# Patient Record
Sex: MALE | Race: WHITE | Employment: FULL TIME | URBAN - METROPOLITAN AREA
[De-identification: names, ages, dates, MRNs, and addresses within clinical notes are randomized per-mention and may not be internally consistent; named-entity substitution may affect disease eponyms.]

---

## 2018-12-27 ENCOUNTER — APPOINTMENT (OUTPATIENT)
Dept: CT IMAGING | Facility: HOSPITAL | Age: 33
DRG: 392 | End: 2018-12-27
Attending: EMERGENCY MEDICINE
Payer: COMMERCIAL

## 2018-12-27 ENCOUNTER — HOSPITAL ENCOUNTER (INPATIENT)
Facility: HOSPITAL | Age: 33
LOS: 3 days | Discharge: HOME | DRG: 392 | End: 2018-12-30
Attending: EMERGENCY MEDICINE | Admitting: HOSPITALIST
Payer: COMMERCIAL

## 2018-12-27 DIAGNOSIS — K52.9 INFLAMMATORY BOWEL DISEASE: Primary | ICD-10-CM

## 2018-12-27 DIAGNOSIS — E11.9 TYPE 2 DIABETES MELLITUS WITHOUT COMPLICATION, WITHOUT LONG-TERM CURRENT USE OF INSULIN (HCC): ICD-10-CM

## 2018-12-27 LAB
ALBUMIN SERPL-MCNC: 3.4 G/DL (ref 3.1–4.5)
ALBUMIN/GLOB SERPL: 0.8 {RATIO} (ref 1–2)
ALP LIVER SERPL-CCNC: 98 U/L (ref 45–117)
ALT SERPL-CCNC: 16 U/L (ref 17–63)
ANION GAP SERPL CALC-SCNC: 11 MMOL/L (ref 0–18)
AST SERPL-CCNC: 21 U/L (ref 15–41)
BASOPHILS # BLD AUTO: 0.03 X10(3) UL (ref 0–0.1)
BASOPHILS NFR BLD AUTO: 0.3 %
BILIRUB SERPL-MCNC: 1.5 MG/DL (ref 0.1–2)
BILIRUB UR QL STRIP.AUTO: NEGATIVE
BUN BLD-MCNC: 11 MG/DL (ref 8–20)
BUN/CREAT SERPL: 17.2 (ref 10–20)
CALCIUM BLD-MCNC: 8.5 MG/DL (ref 8.3–10.3)
CHLORIDE SERPL-SCNC: 101 MMOL/L (ref 101–111)
CLARITY UR REFRACT.AUTO: CLEAR
CO2 SERPL-SCNC: 21 MMOL/L (ref 22–32)
COLOR UR AUTO: YELLOW
CREAT BLD-MCNC: 0.64 MG/DL (ref 0.7–1.3)
CRP SERPL-MCNC: 3.69 MG/DL (ref ?–1)
EOSINOPHIL # BLD AUTO: 0.07 X10(3) UL (ref 0–0.3)
EOSINOPHIL NFR BLD AUTO: 0.6 %
ERYTHROCYTE [DISTWIDTH] IN BLOOD BY AUTOMATED COUNT: 12.7 % (ref 11.5–16)
GLOBULIN PLAS-MCNC: 4.3 G/DL (ref 2.8–4.4)
GLUCOSE BLD-MCNC: 213 MG/DL (ref 70–99)
GLUCOSE UR STRIP.AUTO-MCNC: >=500 MG/DL
HCT VFR BLD AUTO: 49.6 % (ref 37–53)
HGB BLD-MCNC: 17.1 G/DL (ref 13–17)
IMMATURE GRANULOCYTE COUNT: 0.07 X10(3) UL (ref 0–1)
IMMATURE GRANULOCYTE RATIO %: 0.6 %
KETONES UR STRIP.AUTO-MCNC: 80 MG/DL
LEUKOCYTE ESTERASE UR QL STRIP.AUTO: NEGATIVE
LIPASE: 51 U/L (ref 73–393)
LYMPHOCYTES # BLD AUTO: 1.84 X10(3) UL (ref 0.9–4)
LYMPHOCYTES NFR BLD AUTO: 16.5 %
M PROTEIN MFR SERPL ELPH: 7.7 G/DL (ref 6.4–8.2)
MCH RBC QN AUTO: 30.4 PG (ref 27–33.2)
MCHC RBC AUTO-ENTMCNC: 34.5 G/DL (ref 31–37)
MCV RBC AUTO: 88.3 FL (ref 80–99)
MONOCYTES # BLD AUTO: 0.86 X10(3) UL (ref 0.1–1)
MONOCYTES NFR BLD AUTO: 7.7 %
NEUTROPHIL ABS PRELIM: 8.27 X10 (3) UL (ref 1.3–6.7)
NEUTROPHILS # BLD AUTO: 8.27 X10(3) UL (ref 1.3–6.7)
NEUTROPHILS NFR BLD AUTO: 74.3 %
NITRITE UR QL STRIP.AUTO: NEGATIVE
OSMOLALITY SERPL CALC.SUM OF ELEC: 282 MOSM/KG (ref 275–295)
PH UR STRIP.AUTO: 5 [PH] (ref 4.5–8)
PLATELET # BLD AUTO: 282 10(3)UL (ref 150–450)
POTASSIUM SERPL-SCNC: 4.1 MMOL/L (ref 3.6–5.1)
PROT UR STRIP.AUTO-MCNC: 30 MG/DL
RBC # BLD AUTO: 5.62 X10(6)UL (ref 4.3–5.7)
RBC UR QL AUTO: NEGATIVE
RED CELL DISTRIBUTION WIDTH-SD: 41 FL (ref 35.1–46.3)
SED RATE-ML: 11 MM/HR (ref 0–12)
SODIUM SERPL-SCNC: 133 MMOL/L (ref 136–144)
SP GR UR STRIP.AUTO: 1.04 (ref 1–1.03)
UROBILINOGEN UR STRIP.AUTO-MCNC: <2 MG/DL
WBC # BLD AUTO: 11.1 X10(3) UL (ref 4–13)

## 2018-12-27 PROCEDURE — 99223 1ST HOSP IP/OBS HIGH 75: CPT | Performed by: HOSPITALIST

## 2018-12-27 PROCEDURE — 74177 CT ABD & PELVIS W/CONTRAST: CPT | Performed by: EMERGENCY MEDICINE

## 2018-12-27 RX ORDER — MORPHINE SULFATE 4 MG/ML
1 INJECTION, SOLUTION INTRAMUSCULAR; INTRAVENOUS EVERY 2 HOUR PRN
Status: DISCONTINUED | OUTPATIENT
Start: 2018-12-27 | End: 2018-12-30

## 2018-12-27 RX ORDER — LEVOFLOXACIN 5 MG/ML
500 INJECTION, SOLUTION INTRAVENOUS ONCE
Status: DISCONTINUED | OUTPATIENT
Start: 2018-12-27 | End: 2018-12-27

## 2018-12-27 RX ORDER — SODIUM CHLORIDE 9 MG/ML
INJECTION, SOLUTION INTRAVENOUS CONTINUOUS
Status: DISCONTINUED | OUTPATIENT
Start: 2018-12-27 | End: 2018-12-27

## 2018-12-27 RX ORDER — HYDROMORPHONE HYDROCHLORIDE 1 MG/ML
0.5 INJECTION, SOLUTION INTRAMUSCULAR; INTRAVENOUS; SUBCUTANEOUS ONCE
Status: COMPLETED | OUTPATIENT
Start: 2018-12-27 | End: 2018-12-27

## 2018-12-27 RX ORDER — ONDANSETRON 2 MG/ML
4 INJECTION INTRAMUSCULAR; INTRAVENOUS EVERY 6 HOURS PRN
Status: DISCONTINUED | OUTPATIENT
Start: 2018-12-27 | End: 2018-12-30

## 2018-12-27 RX ORDER — MORPHINE SULFATE 4 MG/ML
4 INJECTION, SOLUTION INTRAMUSCULAR; INTRAVENOUS EVERY 2 HOUR PRN
Status: DISCONTINUED | OUTPATIENT
Start: 2018-12-27 | End: 2018-12-30

## 2018-12-27 RX ORDER — METOCLOPRAMIDE HYDROCHLORIDE 5 MG/ML
10 INJECTION INTRAMUSCULAR; INTRAVENOUS EVERY 8 HOURS PRN
Status: DISCONTINUED | OUTPATIENT
Start: 2018-12-27 | End: 2018-12-30

## 2018-12-27 RX ORDER — ENOXAPARIN SODIUM 100 MG/ML
40 INJECTION SUBCUTANEOUS DAILY
Status: DISCONTINUED | OUTPATIENT
Start: 2018-12-27 | End: 2018-12-30

## 2018-12-27 RX ORDER — MORPHINE SULFATE 4 MG/ML
2 INJECTION, SOLUTION INTRAMUSCULAR; INTRAVENOUS EVERY 2 HOUR PRN
Status: DISCONTINUED | OUTPATIENT
Start: 2018-12-27 | End: 2018-12-30

## 2018-12-27 RX ORDER — SODIUM CHLORIDE 9 MG/ML
INJECTION, SOLUTION INTRAVENOUS CONTINUOUS
Status: DISCONTINUED | OUTPATIENT
Start: 2018-12-27 | End: 2018-12-30

## 2018-12-27 RX ORDER — ONDANSETRON 2 MG/ML
4 INJECTION INTRAMUSCULAR; INTRAVENOUS ONCE
Status: COMPLETED | OUTPATIENT
Start: 2018-12-27 | End: 2018-12-27

## 2018-12-27 RX ORDER — METRONIDAZOLE 500 MG/100ML
500 INJECTION, SOLUTION INTRAVENOUS ONCE
Status: DISCONTINUED | OUTPATIENT
Start: 2018-12-27 | End: 2018-12-27

## 2018-12-27 NOTE — H&P
[facility]  History and Physical     Phil Griggs Patient Status:  Emergency    1985 MRN [id number]   Location [address] [facility] Attending WillRebeca MD   Hosp Day # 0 PCP None Pcp     Chief Complaint: Nausea, medications on file prior to encounter. Review of Systems:   A comprehensive 14 point review of systems was completed. Pertinent positives and negatives noted in the HPI.     Physical Exam:    BP (!) 121/91   Pulse 71   Temp 98.4 °F (36.9 °C) (Oral)

## 2018-12-27 NOTE — ED NOTES
Pt has not taken an actual temp at home, but he reports having chills two nights ago. Afebrile here.

## 2018-12-27 NOTE — ED INITIAL ASSESSMENT (HPI)
Patient with mid-abdominal pain, vomiting and diarrhea for 3 days. Patient states he had fevers at home yesterday. Unable to tolerate po intake. Denies any urinary symptoms.

## 2018-12-27 NOTE — ED PROVIDER NOTES
Patient Seen in: [facility] Emergency Department    History   Patient presents with:  Abdomen/Flank Pain (GI/)    Stated Complaint: Mid abdominal pain , vomiting, diarrhea since 12/24    HPI    Patient is a 29-year-old previously healthy male pres SpO2 96 %   O2 Device None (Room air)       Current:BP (!) 121/91   Pulse 71   Temp 98.4 °F (36.9 °C) (Oral)   Resp 16   Ht 185.4 cm (6' 1\")   Wt 122.5 kg   SpO2 97%   BMI 35.62 kg/m²         Physical Exam    General: Comfortable and well appearing.  Rose Mary other components within normal limits   CBC W/ DIFFERENTIAL - Abnormal; Notable for the following components:    HGB 17.1 (*)     Neutrophil Absolute Prelim 8.27 (*)     Neutrophil Absolute 8.27 (*)     All other components within normal limits   CBC WITH uncomfortable. No peritoneal signs. However CT is concerning for inflammatory bowel disease. He does not have a personal history of inflammatory bowel disease nor a family history of inflammatory bowel disease.   I believe he would benefit from admission

## 2018-12-27 NOTE — ED NOTES
Full bedside report on patient condition received from [facility]. Patient resting comfortably in bed at this time, no acute distress noted. Patient reports his pain has resolved to a four at this time. No acute distress noted. Will continue to monitor.

## 2018-12-28 LAB
ALBUMIN SERPL-MCNC: 2.7 G/DL (ref 3.1–4.5)
ALBUMIN/GLOB SERPL: 0.8 {RATIO} (ref 1–2)
ALP LIVER SERPL-CCNC: 74 U/L (ref 45–117)
ALT SERPL-CCNC: 12 U/L (ref 17–63)
ANION GAP SERPL CALC-SCNC: 9 MMOL/L (ref 0–18)
AST SERPL-CCNC: 9 U/L (ref 15–41)
BASOPHILS # BLD AUTO: 0.03 X10(3) UL (ref 0–0.1)
BASOPHILS NFR BLD AUTO: 0.4 %
BILIRUB SERPL-MCNC: 1 MG/DL (ref 0.1–2)
BUN BLD-MCNC: 7 MG/DL (ref 8–20)
BUN/CREAT SERPL: 13 (ref 10–20)
CALCIUM BLD-MCNC: 8.1 MG/DL (ref 8.3–10.3)
CHLORIDE SERPL-SCNC: 107 MMOL/L (ref 101–111)
CO2 SERPL-SCNC: 22 MMOL/L (ref 22–32)
CREAT BLD-MCNC: 0.54 MG/DL (ref 0.7–1.3)
CRP SERPL-MCNC: 2.87 MG/DL (ref ?–1)
EOSINOPHIL # BLD AUTO: 0.15 X10(3) UL (ref 0–0.3)
EOSINOPHIL NFR BLD AUTO: 1.9 %
ERYTHROCYTE [DISTWIDTH] IN BLOOD BY AUTOMATED COUNT: 12.9 % (ref 11.5–16)
EST. AVERAGE GLUCOSE BLD GHB EST-MCNC: 338 MG/DL (ref 68–126)
GLOBULIN PLAS-MCNC: 3.5 G/DL (ref 2.8–4.4)
GLUCOSE BLD-MCNC: 172 MG/DL (ref 70–99)
HBA1C MFR BLD HPLC: 13.4 % (ref ?–5.7)
HBV CORE AB SERPL QL IA: NONREACTIVE
HBV CORE IGM SER QL: NONREACTIVE
HBV SURFACE AB SER QL: NONREACTIVE
HBV SURFACE AB SERPL IA-ACNC: 4.67 MIU/ML
HBV SURFACE AG SER-ACNC: <0.1 [IU]/L
HBV SURFACE AG SERPL QL IA: NONREACTIVE
HCT VFR BLD AUTO: 43.4 % (ref 37–53)
HGB BLD-MCNC: 14.8 G/DL (ref 13–17)
IMMATURE GRANULOCYTE COUNT: 0.04 X10(3) UL (ref 0–1)
IMMATURE GRANULOCYTE RATIO %: 0.5 %
LYMPHOCYTES # BLD AUTO: 3.36 X10(3) UL (ref 0.9–4)
LYMPHOCYTES NFR BLD AUTO: 43.6 %
M PROTEIN MFR SERPL ELPH: 6.2 G/DL (ref 6.4–8.2)
MCH RBC QN AUTO: 30.4 PG (ref 27–33.2)
MCHC RBC AUTO-ENTMCNC: 34.1 G/DL (ref 31–37)
MCV RBC AUTO: 89.1 FL (ref 80–99)
MONOCYTES # BLD AUTO: 0.52 X10(3) UL (ref 0.1–1)
MONOCYTES NFR BLD AUTO: 6.8 %
NEUTROPHIL ABS PRELIM: 3.6 X10 (3) UL (ref 1.3–6.7)
NEUTROPHILS # BLD AUTO: 3.6 X10(3) UL (ref 1.3–6.7)
NEUTROPHILS NFR BLD AUTO: 46.8 %
OSMOLALITY SERPL CALC.SUM OF ELEC: 288 MOSM/KG (ref 275–295)
PLATELET # BLD AUTO: 229 10(3)UL (ref 150–450)
POTASSIUM SERPL-SCNC: 3 MMOL/L (ref 3.6–5.1)
POTASSIUM SERPL-SCNC: 4.1 MMOL/L (ref 3.6–5.1)
RBC # BLD AUTO: 4.87 X10(6)UL (ref 4.3–5.7)
RED CELL DISTRIBUTION WIDTH-SD: 42.1 FL (ref 35.1–46.3)
SED RATE-ML: 9 MM/HR (ref 0–12)
SODIUM SERPL-SCNC: 138 MMOL/L (ref 136–144)
WBC # BLD AUTO: 7.7 X10(3) UL (ref 4–13)

## 2018-12-28 PROCEDURE — 99232 SBSQ HOSP IP/OBS MODERATE 35: CPT | Performed by: HOSPITALIST

## 2018-12-28 RX ORDER — METRONIDAZOLE 500 MG/100ML
500 INJECTION, SOLUTION INTRAVENOUS EVERY 8 HOURS
Status: DISCONTINUED | OUTPATIENT
Start: 2018-12-28 | End: 2018-12-29

## 2018-12-28 RX ORDER — POTASSIUM CHLORIDE 14.9 MG/ML
20 INJECTION INTRAVENOUS ONCE
Status: COMPLETED | OUTPATIENT
Start: 2018-12-28 | End: 2018-12-28

## 2018-12-28 RX ORDER — LEVOFLOXACIN 5 MG/ML
750 INJECTION, SOLUTION INTRAVENOUS EVERY 24 HOURS
Status: DISCONTINUED | OUTPATIENT
Start: 2018-12-28 | End: 2018-12-30

## 2018-12-28 NOTE — PLAN OF CARE
NURSING ADMISSION NOTE      Patient admitted via Cart  Oriented to room. Safety precautions initiated. Bed in low position. Call light in reach. Admission complete  Parents at bedside  States pain Is increasing in mid abdomen.

## 2018-12-28 NOTE — PAYOR COMM NOTE
--------------  ADMISSION REVIEW     Payor: Robin Michelle #:  [id number]  Authorization Number: [id number]    Admit date: 12/27/18  Admit time: 3936       Admitting Physician: Niranjan Pablo MD  Attending Physician:  Elsa Wall MD  Primary Care other components within normal limits   URINALYSIS WITH CULTURE REFLEX - Abnormal; Notable for the following components:    Spec Gravity 1.040 (*)     Glucose Urine >=500 (*)     Ketones Urine 80  (*)     Protein Urine 30  (*)     Mucous Urine 1+ (*)     A diagnosis)        ASSESSMENT / PLAN:     1. Abdominal pain with imaging findings concerning for IBD, presumed Crohns exacerbation   2. Hyponatremia  3. Metabolic acidosis  4.  Dehydration    Plan:  Admit  Clear liquid diet  IVF  Analgesics and antiemetics a Intravenous Shantelle Ibrahim, RN    12/27/2018 2304 New Bag (none) Intravenous Sarah Darden RN      sodium chloride 0.9% IV bolus 1,000 mL     Date Action Dose Route User    12/27/2018 1310 New Bag 1000 mL Intravenous Micheal Hagan, ALAN        PLEASE FAX

## 2018-12-28 NOTE — PLAN OF CARE
GASTROINTESTINAL - ADULT    • Minimal or absence of nausea and vomiting Not Progressing    • Maintains or returns to baseline bowel function Not Progressing    • Maintains adequate nutritional intake (undernourished) Not Progressing        PAIN - ADULT

## 2018-12-28 NOTE — PROGRESS NOTES
[facility]  Progress Note     Herminia Vee Patient Status:  Inpatient    1985 MRN [id number]   [facility] 3NW-A Attending Tammi Barahona MD   Hosp Day # 1 PCP None Pcp     Chief Complaint: Diarrhea    S: Patient states pain PLAN:     1. Acute ileitis:  Await stool studies, IVF's, GI evaluation. 2. Hyperglycemia:  Check A1C  3.  Hypokalemia:  correct    Plan of care: AS above    Quality:  · DVT Prophylaxis: Lovenox  · CODE status: Full code  · Sargent: None  · Central line: None

## 2018-12-28 NOTE — PROGRESS NOTES
[facility] Pharmacy Note:  Renal Adjustment for levofloxacin [facility])    Herminia Vee is a 35year old male who has been prescribed levofloxacin (LEVAQUIN) 500 mg every 24 hrs.   CrCl is estimated creatinine clearance is 219.9 mL/min (A) (based on SCr of 0.5

## 2018-12-28 NOTE — CONSULTS
[facility]  Report of GI Consultation    Jazmine Phelan Patient Status:  Inpatient    1985 MRN [id number]   [facility] 3NW-A Attending Elsa Wall MD   Hosp Day # 1 PCP None Pcp     Date of Admission:  2018  Date of Con Maternal Uncle    • Psychiatric Maternal Uncle         hospitalized several times       Social History  Works in [employer] in [employer].   No EtOH or tobacco    Current Medications:    Current Facility-Administered Medications:  levofloxacin in (L) 12/28/2018    BUN 7 (L) 12/28/2018     12/28/2018    K 3.0 (L) 12/28/2018    CO2 22.0 12/28/2018    CA 8.1 (L) 12/28/2018    ALB 2.7 (L) 12/28/2018    ALKPHO 74 12/28/2018    TP 6.2 (L) 12/28/2018    AST 9 (L) 12/28/2018    ALT 12 (L) 12/28/2018 colonoscopy  4. OK to address boil / ingrown hair on neck during admission, no contraindication  5. DVT proph    Thank you for allowing me to participate in the care of your patient.     Lidia Colon MD  12/28/2018

## 2018-12-29 ENCOUNTER — ANESTHESIA EVENT (OUTPATIENT)
Dept: SURGERY | Facility: HOSPITAL | Age: 33
DRG: 392 | End: 2018-12-29
Payer: COMMERCIAL

## 2018-12-29 ENCOUNTER — ANESTHESIA (OUTPATIENT)
Dept: SURGERY | Facility: HOSPITAL | Age: 33
DRG: 392 | End: 2018-12-29
Payer: COMMERCIAL

## 2018-12-29 LAB
ANION GAP SERPL CALC-SCNC: 9 MMOL/L (ref 0–18)
BASOPHILS # BLD AUTO: 0.01 X10(3) UL (ref 0–0.1)
BASOPHILS NFR BLD AUTO: 0.2 %
BUN BLD-MCNC: 5 MG/DL (ref 8–20)
BUN/CREAT SERPL: 9.3 (ref 10–20)
CALCIUM BLD-MCNC: 8.2 MG/DL (ref 8.3–10.3)
CHLORIDE SERPL-SCNC: 111 MMOL/L (ref 101–111)
CO2 SERPL-SCNC: 21 MMOL/L (ref 22–32)
CREAT BLD-MCNC: 0.54 MG/DL (ref 0.7–1.3)
EOSINOPHIL # BLD AUTO: 0.12 X10(3) UL (ref 0–0.3)
EOSINOPHIL NFR BLD AUTO: 2 %
ERYTHROCYTE [DISTWIDTH] IN BLOOD BY AUTOMATED COUNT: 12.8 % (ref 11.5–16)
GLUCOSE BLD-MCNC: 108 MG/DL (ref 65–99)
GLUCOSE BLD-MCNC: 110 MG/DL (ref 65–99)
GLUCOSE BLD-MCNC: 123 MG/DL (ref 65–99)
GLUCOSE BLD-MCNC: 141 MG/DL (ref 70–99)
GLUCOSE BLD-MCNC: 205 MG/DL (ref 65–99)
HCT VFR BLD AUTO: 41.9 % (ref 37–53)
HGB BLD-MCNC: 13.5 G/DL (ref 13–17)
IMMATURE GRANULOCYTE COUNT: 0.03 X10(3) UL (ref 0–1)
IMMATURE GRANULOCYTE RATIO %: 0.5 %
LYMPHOCYTES # BLD AUTO: 2.53 X10(3) UL (ref 0.9–4)
LYMPHOCYTES NFR BLD AUTO: 41.3 %
MCH RBC QN AUTO: 29.8 PG (ref 27–33.2)
MCHC RBC AUTO-ENTMCNC: 32.2 G/DL (ref 31–37)
MCV RBC AUTO: 92.5 FL (ref 80–99)
MONOCYTES # BLD AUTO: 0.39 X10(3) UL (ref 0.1–1)
MONOCYTES NFR BLD AUTO: 6.4 %
NEUTROPHIL ABS PRELIM: 3.04 X10 (3) UL (ref 1.3–6.7)
NEUTROPHILS # BLD AUTO: 3.04 X10(3) UL (ref 1.3–6.7)
NEUTROPHILS NFR BLD AUTO: 49.6 %
OSMOLALITY SERPL CALC.SUM OF ELEC: 292 MOSM/KG (ref 275–295)
PLATELET # BLD AUTO: 218 10(3)UL (ref 150–450)
POTASSIUM SERPL-SCNC: 3.5 MMOL/L (ref 3.6–5.1)
RBC # BLD AUTO: 4.53 X10(6)UL (ref 4.3–5.7)
RED CELL DISTRIBUTION WIDTH-SD: 43.6 FL (ref 35.1–46.3)
SODIUM SERPL-SCNC: 141 MMOL/L (ref 136–144)
WBC # BLD AUTO: 6.1 X10(3) UL (ref 4–13)

## 2018-12-29 PROCEDURE — 99232 SBSQ HOSP IP/OBS MODERATE 35: CPT | Performed by: HOSPITALIST

## 2018-12-29 PROCEDURE — 0J943ZZ DRAINAGE OF RIGHT NECK SUBCUTANEOUS TISSUE AND FASCIA, PERCUTANEOUS APPROACH: ICD-10-PCS | Performed by: SURGERY

## 2018-12-29 PROCEDURE — 99254 IP/OBS CNSLTJ NEW/EST MOD 60: CPT | Performed by: SURGERY

## 2018-12-29 RX ORDER — HYDROMORPHONE HYDROCHLORIDE 1 MG/ML
0.4 INJECTION, SOLUTION INTRAMUSCULAR; INTRAVENOUS; SUBCUTANEOUS EVERY 5 MIN PRN
Status: DISCONTINUED | OUTPATIENT
Start: 2018-12-29 | End: 2018-12-29 | Stop reason: HOSPADM

## 2018-12-29 RX ORDER — DEXTROSE MONOHYDRATE 25 G/50ML
50 INJECTION, SOLUTION INTRAVENOUS
Status: DISCONTINUED | OUTPATIENT
Start: 2018-12-29 | End: 2018-12-29 | Stop reason: HOSPADM

## 2018-12-29 RX ORDER — LIDOCAINE HYDROCHLORIDE AND EPINEPHRINE 10; 10 MG/ML; UG/ML
INJECTION, SOLUTION INFILTRATION; PERINEURAL AS NEEDED
Status: DISCONTINUED | OUTPATIENT
Start: 2018-12-29 | End: 2018-12-29 | Stop reason: HOSPADM

## 2018-12-29 RX ORDER — ONDANSETRON 2 MG/ML
4 INJECTION INTRAMUSCULAR; INTRAVENOUS AS NEEDED
Status: DISCONTINUED | OUTPATIENT
Start: 2018-12-29 | End: 2018-12-29 | Stop reason: HOSPADM

## 2018-12-29 RX ORDER — MEPERIDINE HYDROCHLORIDE 25 MG/ML
12.5 INJECTION INTRAMUSCULAR; INTRAVENOUS; SUBCUTANEOUS AS NEEDED
Status: DISCONTINUED | OUTPATIENT
Start: 2018-12-29 | End: 2018-12-29 | Stop reason: HOSPADM

## 2018-12-29 RX ORDER — SODIUM CHLORIDE, SODIUM LACTATE, POTASSIUM CHLORIDE, CALCIUM CHLORIDE 600; 310; 30; 20 MG/100ML; MG/100ML; MG/100ML; MG/100ML
INJECTION, SOLUTION INTRAVENOUS CONTINUOUS
Status: DISCONTINUED | OUTPATIENT
Start: 2018-12-29 | End: 2018-12-29 | Stop reason: HOSPADM

## 2018-12-29 RX ORDER — NALOXONE HYDROCHLORIDE 0.4 MG/ML
80 INJECTION, SOLUTION INTRAMUSCULAR; INTRAVENOUS; SUBCUTANEOUS AS NEEDED
Status: DISCONTINUED | OUTPATIENT
Start: 2018-12-29 | End: 2018-12-29 | Stop reason: HOSPADM

## 2018-12-29 RX ORDER — DEXTROSE MONOHYDRATE 25 G/50ML
50 INJECTION, SOLUTION INTRAVENOUS
Status: DISCONTINUED | OUTPATIENT
Start: 2018-12-29 | End: 2018-12-30

## 2018-12-29 RX ORDER — CLINDAMYCIN PHOSPHATE 900 MG/50ML
900 INJECTION INTRAVENOUS EVERY 8 HOURS
Status: DISCONTINUED | OUTPATIENT
Start: 2018-12-29 | End: 2018-12-30

## 2018-12-29 NOTE — CONSULTS
[facility]  Report of Consultation    Jackie Vega Patient Status:  Inpatient    1985 MRN [id number]   [facility] 3NW-A Attending Vishal Shankar MD   Hosp Day # 2 PCP None Pcp     Reason for Consultation:  Neck abscess  Date o Father    • PTSD Father         Sandra Piper   • Bipolar Disorder Brother         pt's theory- he has been hospitalized for psych   • Alcohol and Other Disorders Associated Brother    • Substance Abuse Brother    • Heart Disorder Maternal Grandfather    • C PRN  •  Metoclopramide HCl (REGLAN) injection 10 mg, 10 mg, Intravenous, Q8H PRN    Review of Systems:    Allergic/Immuno:  Review of patient's allergies complete, up to date.   Cardiovascular:  Negative for cool extremity and irregular heartbeat/palpitatio Soft, non-distended, non-tender, no rebound pain or guarding. Extremities:  No lower extremity edema noted. Without clubbing or cyanosis. Skin: Normal texture and turgor.       Laboratory Data:  Recent Labs   Lab  12/27/18   1314  12/28/18   1039 informed consent  6.  DVT prophylaxis    Time spent on counseling/coordination of care:  45 Minutes    Total time spent with patient:  [address] Rachana Lara D.O.  12/29/2018  12:29 PM

## 2018-12-29 NOTE — ANESTHESIA PREPROCEDURE EVALUATION
PRE-OP EVALUATION    Patient Name: Ibis Schaefer    Pre-op Diagnosis: PAINFUL NECK    Procedure(s):  INCISION AND DRAINAGE NECK ABSCESS    Surgeon(s) and Role:     Sneha Fontana, DO - Primary    Pre-op vitals reviewed.   Temp: 97.7 °F (36.5 °C)  Pulse morphINE sulfate (PF) 4 MG/ML injection 2 mg 2 mg Intravenous Q2H PRN   Or      morphINE sulfate (PF) 4 MG/ML injection 4 mg 4 mg Intravenous Q2H PRN   ondansetron HCl (ZOFRAN) injection 4 mg 4 mg Intravenous Q6H PRN   Metoclopramide HCl (REGLAN) injecti Airway      Mallampati: II  Mouth opening: 3 FB  TM distance: 4 - 6 cm  Neck ROM: full Cardiovascular    Cardiovascular exam normal.         Dental    No notable dental history.          Pulmonary    Pulmonary exam normal.                 Other findin

## 2018-12-29 NOTE — PROGRESS NOTES
[facility]  Progress Note     Tram Khloe Patient Status:  Inpatient    1985 MRN [id number]   [facility] 3NW-A Attending Christian Villalba MD   Hosp Day # 2 PCP None Pcp     Chief Complaint: Diarrhea    S: Patient without jorge in the last 168 hours. No results for input(s): TROP, CK in the last 168 hours. Imaging: Imaging data reviewed in Epic.     Medications:   • potassium chloride 40mEq IVPB (peripheral line)  40 mEq Intravenous Once   • clindamycin  900 mg Intraven

## 2018-12-29 NOTE — BRIEF OP NOTE
Pre-Operative Diagnosis: Right Posterior  Neck Abscess     Post-Operative Diagnosis: Right Posterior  Neck Abscess      Procedure Performed:   Procedure(s):  INCISION AND DRAINAGE RIGHT NECK ABSCESS    Surgeon(s) and Role:     DO Julian Hamilton

## 2018-12-29 NOTE — ANESTHESIA POSTPROCEDURE EVALUATION
[facility]   Age/Gender 35year old male MRN [id number]   [facility] SURGERY Attending Tamir Sanon, [address] [facility] Se Day # 2 PCP None Pcp       Anesthesia Post-op Note    Procedure(s):  INCISION A

## 2018-12-30 VITALS
BODY MASS INDEX: 35.78 KG/M2 | HEART RATE: 55 BPM | DIASTOLIC BLOOD PRESSURE: 81 MMHG | WEIGHT: 270 LBS | SYSTOLIC BLOOD PRESSURE: 131 MMHG | TEMPERATURE: 97 F | RESPIRATION RATE: 18 BRPM | HEIGHT: 73 IN | OXYGEN SATURATION: 95 %

## 2018-12-30 LAB
ANION GAP SERPL CALC-SCNC: 7 MMOL/L (ref 0–18)
BASOPHILS # BLD AUTO: 0.01 X10(3) UL (ref 0–0.1)
BASOPHILS NFR BLD AUTO: 0.2 %
BUN BLD-MCNC: 5 MG/DL (ref 8–20)
BUN/CREAT SERPL: 8.9 (ref 10–20)
CALCIUM BLD-MCNC: 8.5 MG/DL (ref 8.3–10.3)
CHLORIDE SERPL-SCNC: 111 MMOL/L (ref 101–111)
CO2 SERPL-SCNC: 21 MMOL/L (ref 22–32)
CREAT BLD-MCNC: 0.56 MG/DL (ref 0.7–1.3)
EOSINOPHIL # BLD AUTO: 0.02 X10(3) UL (ref 0–0.3)
EOSINOPHIL NFR BLD AUTO: 0.3 %
ERYTHROCYTE [DISTWIDTH] IN BLOOD BY AUTOMATED COUNT: 12.7 % (ref 11.5–16)
GLUCOSE BLD-MCNC: 133 MG/DL (ref 65–99)
GLUCOSE BLD-MCNC: 141 MG/DL (ref 70–99)
GLUCOSE BLD-MCNC: 191 MG/DL (ref 65–99)
HCT VFR BLD AUTO: 41.8 % (ref 37–53)
HGB BLD-MCNC: 13.9 G/DL (ref 13–17)
IMMATURE GRANULOCYTE COUNT: 0.01 X10(3) UL (ref 0–1)
IMMATURE GRANULOCYTE RATIO %: 0.2 %
LYMPHOCYTES # BLD AUTO: 1.86 X10(3) UL (ref 0.9–4)
LYMPHOCYTES NFR BLD AUTO: 28.6 %
MCH RBC QN AUTO: 30.3 PG (ref 27–33.2)
MCHC RBC AUTO-ENTMCNC: 33.3 G/DL (ref 31–37)
MCV RBC AUTO: 91.3 FL (ref 80–99)
MONOCYTES # BLD AUTO: 0.43 X10(3) UL (ref 0.1–1)
MONOCYTES NFR BLD AUTO: 6.6 %
NEUTROPHIL ABS PRELIM: 4.17 X10 (3) UL (ref 1.3–6.7)
NEUTROPHILS # BLD AUTO: 4.17 X10(3) UL (ref 1.3–6.7)
NEUTROPHILS NFR BLD AUTO: 64.1 %
OSMOLALITY SERPL CALC.SUM OF ELEC: 288 MOSM/KG (ref 275–295)
PLATELET # BLD AUTO: 220 10(3)UL (ref 150–450)
POTASSIUM SERPL-SCNC: 3.9 MMOL/L (ref 3.6–5.1)
POTASSIUM SERPL-SCNC: 3.9 MMOL/L (ref 3.6–5.1)
RBC # BLD AUTO: 4.58 X10(6)UL (ref 4.3–5.7)
RED CELL DISTRIBUTION WIDTH-SD: 41.6 FL (ref 35.1–46.3)
SODIUM SERPL-SCNC: 139 MMOL/L (ref 136–144)
WBC # BLD AUTO: 6.5 X10(3) UL (ref 4–13)

## 2018-12-30 PROCEDURE — 99239 HOSP IP/OBS DSCHRG MGMT >30: CPT | Performed by: HOSPITALIST

## 2018-12-30 RX ORDER — BLOOD-GLUCOSE METER
KIT MISCELLANEOUS
Qty: 1 KIT | Refills: 0 | Status: SHIPPED | OUTPATIENT
Start: 2018-12-30 | End: 2019-06-28

## 2018-12-30 RX ORDER — BLOOD SUGAR DIAGNOSTIC
STRIP MISCELLANEOUS
Qty: 50 STRIP | Refills: 6 | Status: SHIPPED | OUTPATIENT
Start: 2018-12-30 | End: 2019-06-28

## 2018-12-30 RX ORDER — CLINDAMYCIN HYDROCHLORIDE 300 MG/1
300 CAPSULE ORAL 4 TIMES DAILY
Qty: 28 CAPSULE | Refills: 0 | Status: SHIPPED | OUTPATIENT
Start: 2018-12-30 | End: 2019-01-06

## 2018-12-30 RX ORDER — LEVOFLOXACIN 750 MG/1
750 TABLET ORAL DAILY
Qty: 4 TABLET | Refills: 0 | Status: SHIPPED | OUTPATIENT
Start: 2018-12-30 | End: 2019-01-03

## 2018-12-30 RX ORDER — LANCETS 28 GAUGE
EACH MISCELLANEOUS
Qty: 50 EACH | Refills: 6 | Status: SHIPPED | OUTPATIENT
Start: 2018-12-30 | End: 2019-06-28

## 2018-12-30 NOTE — PLAN OF CARE
Minimal or absence of nausea and vomiting Progressing      Maintains or returns to baseline bowel function Progressing      Maintains adequate nutritional intake (undernourished) Progressing      Pt's integumentary status will be adequate for discharge Pro

## 2018-12-30 NOTE — PROGRESS NOTES
[facility]  Progress Note     Babatunde Padilla Patient Status:  Inpatient    1985 MRN [id number]   [facility] 3NW-A Attending Enedina Fairbanks MD   Hosp Day # 3 PCP None Pcp     Chief Complaint: Diarrhea    S: Patient is tolerati 212 mL/min (A) (based on SCr of 0.56 mg/dL (L)). No results for input(s): PTP, INR in the last 168 hours. No results for input(s): TROP, CK in the last 168 hours. Imaging: Imaging data reviewed in Epic.     Medications:   • clindamycin  900 mg

## 2018-12-30 NOTE — PROGRESS NOTES
[address] [facility]  Report of GI Consultation    Dana Merlin Patient Status:  Inpatient    1985 MRN [id number]   [facility] 3NW-A Attending Vangie Combs MD   [facility] Day # 2 PCP None Pcp     Date of Admission:  2018  PCP: None P %.    GENERAL: NAD, oriented x 3, pleasant  PULM: Lungs clear to auscultation anteriorly  CV: Regular, no murmurs  ABD: Mild epigastric pain with deep palpation, no palpable masses, no rebound or guarding.     Results:     Laboratory Data:  Lab Results   Co

## 2018-12-30 NOTE — PROGRESS NOTES
[facility]  Report of GI Consultation    Nicholas Nazario Patient Status:  Inpatient    1985 MRN [id number]   [facility] 3NW-A Attending Suly Felder MD   [facility] Day # 3 PCP None Pcp     Date of Admission:  2018  PCP: None P NAD, oriented x 3, pleasant  HEENT: Normal oral mucosa, good dentition, no ulceration  PULM: Lungs clear to auscultation anteriorly  CV: Regular, no murmurs  ABD: S/NT/ND, no fluid wave, no masses    Results:     Laboratory Data:  Lab Results   Component V

## 2018-12-30 NOTE — PROGRESS NOTES
[facility]  Progress Note    [facility] Patient Status:  Inpatient    1985 MRN [id number]   [facility] 3NW-A Attending Randall Aschoff, MD   Hosp Day # 3 PCP None Pcp     Subjective:  Feels good. Minimal pain.     Objective/Ph

## 2018-12-30 NOTE — PROGRESS NOTES
NURSING DISCHARGE NOTE    Discharged Home via Wheelchair. Accompanied by Family member and Support staff  Belongings Taken by patient/familyDISCUSSED D/C INSTRUCTIONS WITH PATIENT AND FAMILY . ANSWERED ALL QUESTIONS . VERBALIZED UNDERSTANDING .  Cody Rodriguez

## 2018-12-31 NOTE — DISCHARGE SUMMARY
[facility]  DISCHARGE SUMMARY     Angel Hdez Patient Status:  Inpatient    1985 MRN [id number]   [facility] 3NW-A Attending No att. providers found   Hosp Day # 3 PCP None Pcp     Date of Admission: 2018  Date of Chen Nazario · None    Consultants:  • Dr. Lexis Lowe, Dr. Flores Beckford    Discharge Medication List:     Discharge Medications      START taking these medications      Instructions Prescription details   Clindamycin HCl 300 MG Caps  Commonly known as:  CLEOCIN      Take 1 capsu your prescriptions at the location directed by your doctor or nurse    Bring a paper prescription for each of these medications  · Clindamycin HCl 300 MG Caps  · FREESTYLE LANCETS Misc  · FREESTYLE SYSTEM Kit  · FREESTYLE TEST Strp  · levofloxacin 750 MG T

## 2018-12-31 NOTE — OPERATIVE REPORT
[facility]    PATIENT'S NAME: Stefanibuck Mello   ATTENDING PHYSICIAN: Elder Otto M.D.    OPERATING PHYSICIAN: Pura Penny D.O.   PATIENT ACCOUNT#:   [id number]    LOCATION:  [address]  MEDICAL RECORD #:   [id number]       DATE OF BIRTH:  [birth year]

## 2019-01-02 LAB
M TB TUBERC IFN-G BLD QL: NEGATIVE
M TB TUBERC IFN-G/MITOGEN IGNF BLD: 0 IU/ML
M TB TUBERC IFN-G/MITOGEN IGNF BLD: 0.01 IU/ML
M TB TUBERC IGNF/MITOGEN IGNF CONTROL: 7.62 IU/ML
MITOGEN IGNF BCKGRD COR BLD-ACNC: 0.01 IU/ML

## 2019-01-03 LAB — THIOPURINE METHYLTRANSFERASE: 28.1 U/ML

## 2022-03-29 ENCOUNTER — OFFICE VISIT (OUTPATIENT)
Dept: INTERNAL MEDICINE CLINIC | Facility: CLINIC | Age: 37
End: 2022-03-29
Payer: COMMERCIAL

## 2022-03-29 VITALS
HEIGHT: 74 IN | DIASTOLIC BLOOD PRESSURE: 76 MMHG | OXYGEN SATURATION: 97 % | BODY MASS INDEX: 33.62 KG/M2 | RESPIRATION RATE: 16 BRPM | SYSTOLIC BLOOD PRESSURE: 122 MMHG | HEART RATE: 86 BPM | TEMPERATURE: 97 F | WEIGHT: 262 LBS

## 2022-03-29 DIAGNOSIS — E66.9 CLASS 1 OBESITY: ICD-10-CM

## 2022-03-29 DIAGNOSIS — Z00.00 LABORATORY EXAMINATION ORDERED AS PART OF A COMPLETE PHYSICAL EXAMINATION: ICD-10-CM

## 2022-03-29 DIAGNOSIS — Z13.0 SCREENING FOR BLOOD DISEASE: ICD-10-CM

## 2022-03-29 DIAGNOSIS — F41.9 ANXIETY: ICD-10-CM

## 2022-03-29 DIAGNOSIS — F17.200 TOBACCO DEPENDENCE: ICD-10-CM

## 2022-03-29 DIAGNOSIS — E11.9 TYPE 2 DIABETES MELLITUS WITHOUT COMPLICATION, WITHOUT LONG-TERM CURRENT USE OF INSULIN (HCC): Primary | ICD-10-CM

## 2022-03-29 DIAGNOSIS — F32.A DEPRESSION, UNSPECIFIED DEPRESSION TYPE: ICD-10-CM

## 2022-03-29 DIAGNOSIS — Z13.29 SCREENING FOR THYROID DISORDER: ICD-10-CM

## 2022-03-29 PROBLEM — E66.811 CLASS 1 OBESITY: Status: ACTIVE | Noted: 2022-03-29

## 2022-03-29 PROCEDURE — 3074F SYST BP LT 130 MM HG: CPT | Performed by: INTERNAL MEDICINE

## 2022-03-29 PROCEDURE — 99204 OFFICE O/P NEW MOD 45 MIN: CPT | Performed by: INTERNAL MEDICINE

## 2022-03-29 PROCEDURE — 3078F DIAST BP <80 MM HG: CPT | Performed by: INTERNAL MEDICINE

## 2022-03-29 PROCEDURE — 3008F BODY MASS INDEX DOCD: CPT | Performed by: INTERNAL MEDICINE

## 2022-03-29 RX ORDER — BUPROPION HYDROCHLORIDE 150 MG/1
150 TABLET ORAL DAILY
Qty: 30 TABLET | Refills: 0 | Status: SHIPPED | OUTPATIENT
Start: 2022-03-29

## 2022-03-29 RX ORDER — FLUOXETINE 20 MG/1
20 TABLET, FILM COATED ORAL DAILY
Qty: 30 TABLET | Refills: 0 | Status: SHIPPED | OUTPATIENT
Start: 2022-03-29 | End: 2022-03-29

## 2022-03-29 RX ORDER — TRAZODONE HYDROCHLORIDE 50 MG/1
50 TABLET ORAL NIGHTLY
Qty: 30 TABLET | Refills: 0 | Status: SHIPPED | OUTPATIENT
Start: 2022-03-29

## 2022-04-19 ENCOUNTER — OFFICE VISIT (OUTPATIENT)
Dept: INTERNAL MEDICINE CLINIC | Facility: CLINIC | Age: 37
End: 2022-04-19
Payer: COMMERCIAL

## 2022-04-19 VITALS
HEIGHT: 74 IN | BODY MASS INDEX: 33.86 KG/M2 | WEIGHT: 263.81 LBS | RESPIRATION RATE: 16 BRPM | HEART RATE: 84 BPM | SYSTOLIC BLOOD PRESSURE: 130 MMHG | DIASTOLIC BLOOD PRESSURE: 82 MMHG | TEMPERATURE: 97 F

## 2022-04-19 DIAGNOSIS — Z00.00 ROUTINE GENERAL MEDICAL EXAMINATION AT A HEALTH CARE FACILITY: Primary | ICD-10-CM

## 2022-04-19 DIAGNOSIS — E11.9 TYPE 2 DIABETES MELLITUS WITHOUT COMPLICATION, WITHOUT LONG-TERM CURRENT USE OF INSULIN (HCC): ICD-10-CM

## 2022-04-19 DIAGNOSIS — F17.200 TOBACCO DEPENDENCE: ICD-10-CM

## 2022-04-19 DIAGNOSIS — E66.9 CLASS 1 OBESITY: ICD-10-CM

## 2022-04-19 DIAGNOSIS — F32.A DEPRESSION, UNSPECIFIED DEPRESSION TYPE: ICD-10-CM

## 2022-04-19 DIAGNOSIS — F41.9 ANXIETY: ICD-10-CM

## 2022-04-19 PROBLEM — K52.9 INFLAMMATORY BOWEL DISEASE: Status: RESOLVED | Noted: 2018-12-27 | Resolved: 2022-04-19

## 2022-04-19 PROCEDURE — 99395 PREV VISIT EST AGE 18-39: CPT | Performed by: INTERNAL MEDICINE

## 2022-04-19 PROCEDURE — 3008F BODY MASS INDEX DOCD: CPT | Performed by: INTERNAL MEDICINE

## 2022-04-19 PROCEDURE — 3079F DIAST BP 80-89 MM HG: CPT | Performed by: INTERNAL MEDICINE

## 2022-04-19 PROCEDURE — 3075F SYST BP GE 130 - 139MM HG: CPT | Performed by: INTERNAL MEDICINE

## 2022-04-19 RX ORDER — OMEPRAZOLE 40 MG/1
40 CAPSULE, DELAYED RELEASE ORAL DAILY
COMMUNITY
Start: 2022-03-27

## 2022-04-19 RX ORDER — BUPROPION HYDROCHLORIDE 300 MG/1
300 TABLET ORAL DAILY
Qty: 90 TABLET | Refills: 1 | Status: SHIPPED | OUTPATIENT
Start: 2022-04-19

## 2022-04-19 RX ORDER — FLUOXETINE 20 MG/1
20 TABLET, FILM COATED ORAL DAILY
COMMUNITY
Start: 2022-03-29 | End: 2022-04-19

## 2022-04-25 RX ORDER — TRAZODONE HYDROCHLORIDE 50 MG/1
TABLET ORAL
Qty: 30 TABLET | Refills: 0 | Status: SHIPPED | OUTPATIENT
Start: 2022-04-25

## 2022-04-25 NOTE — TELEPHONE ENCOUNTER
Last VISIT 04/19/22    Last CPE 04/19/22    Last REFILL 03/29/22 qty 30 w/0 refills    Last LABS No Recent labs done    No future appointments. Per PROTOCOL? Not on protocol      Please Approve or Deny.

## 2022-05-02 RX ORDER — BUPROPION HYDROCHLORIDE 150 MG/1
TABLET ORAL
Qty: 30 TABLET | Refills: 0 | Status: SHIPPED | OUTPATIENT
Start: 2022-05-02

## 2022-05-02 RX ORDER — FLUOXETINE 20 MG/1
TABLET, FILM COATED ORAL
Qty: 30 TABLET | Refills: 0 | Status: SHIPPED | OUTPATIENT
Start: 2022-05-02

## 2022-05-02 NOTE — TELEPHONE ENCOUNTER
Last visit- 04/19/2022 cpe seen by AD    Last refill- 03/29/2022 metformin hcl 1000mg QTY30 0R,  03/29/2022 fluoxetine hcl 20mg QTY30 0R,  03/29/2022 bupropion er 150mg QTY30 0R    Last labs- no recent labs     No future appointments. Per Protocol?   Please approve or deny

## 2022-05-31 RX ORDER — TRAZODONE HYDROCHLORIDE 50 MG/1
TABLET ORAL
Qty: 30 TABLET | Refills: 0 | Status: SHIPPED | OUTPATIENT
Start: 2022-05-31

## 2022-05-31 NOTE — TELEPHONE ENCOUNTER
Last VISIT 04/19/22    Last CPE 04/19/22    Last REFILL 04/25/22 qty 30 w/0 refills    Last LABS No recent labs done    No future appointments. Per PROTOCOL? Not on protocol      Please Approve or Deny.

## 2022-06-27 RX ORDER — TRAZODONE HYDROCHLORIDE 50 MG/1
50 TABLET ORAL NIGHTLY
Qty: 90 TABLET | Refills: 0 | Status: SHIPPED | OUTPATIENT
Start: 2022-06-27 | End: 2022-09-12

## 2022-06-27 NOTE — TELEPHONE ENCOUNTER
Last VISIT 04/19/22    Last CPE 04/19/22    Last REFILL 05/31/22 qty 30 w/0 refills    Last LABS No recent labs done    No future appointments. Per PROTOCOL? Not on protocol      Please Approve or Deny.

## 2022-09-12 RX ORDER — TRAZODONE HYDROCHLORIDE 50 MG/1
TABLET ORAL
Qty: 90 TABLET | Refills: 0 | Status: SHIPPED | OUTPATIENT
Start: 2022-09-12

## 2022-09-12 NOTE — TELEPHONE ENCOUNTER
Last OV 4.19.22 w/ AD (annual pe)   Last PE 4.19.22  Last REFILL 6.27.22 Trazodone 50mg #90 0R  Last LABS No recent labs within last 12 months     No future appointments. Per PROTOCOL?  Not on protocol     Please Advise

## 2022-09-19 RX ORDER — BUPROPION HYDROCHLORIDE 300 MG/1
TABLET ORAL
Qty: 90 TABLET | Refills: 1 | Status: SHIPPED | OUTPATIENT
Start: 2022-09-19

## 2022-09-19 NOTE — TELEPHONE ENCOUNTER
Last VISIT 04/19/22    Last CPE 04/19/22    Last REFILL 04/19/22 qty 90 w/1 refill     Last LABS Last labs 2018    No future appointments. Per PROTOCOL? Not on protocol      Please Approve or Deny.

## 2022-11-07 RX ORDER — TRAZODONE HYDROCHLORIDE 50 MG/1
TABLET ORAL
Qty: 90 TABLET | Refills: 0 | Status: SHIPPED | OUTPATIENT
Start: 2022-11-07

## 2023-04-17 ENCOUNTER — TELEPHONE (OUTPATIENT)
Dept: INTERNAL MEDICINE CLINIC | Facility: CLINIC | Age: 38
End: 2023-04-17

## 2023-04-17 DIAGNOSIS — Z13.29 SCREENING FOR THYROID DISORDER: ICD-10-CM

## 2023-04-17 DIAGNOSIS — Z13.228 SCREENING FOR METABOLIC DISORDER: ICD-10-CM

## 2023-04-17 DIAGNOSIS — Z13.0 SCREENING FOR DISORDER OF BLOOD AND BLOOD-FORMING ORGANS: ICD-10-CM

## 2023-04-17 DIAGNOSIS — E11.9 TYPE 2 DIABETES MELLITUS WITHOUT COMPLICATION, WITHOUT LONG-TERM CURRENT USE OF INSULIN (HCC): Primary | ICD-10-CM

## 2023-04-17 DIAGNOSIS — Z00.00 ROUTINE GENERAL MEDICAL EXAMINATION AT A HEALTH CARE FACILITY: ICD-10-CM

## 2023-04-17 DIAGNOSIS — Z13.220 SCREENING FOR LIPID DISORDERS: ICD-10-CM

## 2023-04-17 NOTE — TELEPHONE ENCOUNTER
Orders to  Farhat           Pt aware to get labs done no sooner than 2 weeks prior to the appt.   Pt aware to fast.  No call back required    Pt stated he will go to [facility] for this labs, sm    Future Appointments   Date Time Provider Alexandria Ramirez   6/1/2023  8:20 AM Thom Mcclure MD [facility]

## 2023-05-12 ENCOUNTER — LAB ENCOUNTER (OUTPATIENT)
Dept: LAB | Facility: REFERENCE LAB | Age: 38
End: 2023-05-12
Attending: INTERNAL MEDICINE
Payer: COMMERCIAL

## 2023-05-12 LAB
ALBUMIN SERPL-MCNC: 3.7 G/DL (ref 3.4–5)
ALBUMIN/GLOB SERPL: 0.9 {RATIO} (ref 1–2)
ALP LIVER SERPL-CCNC: 84 U/L
ALT SERPL-CCNC: 43 U/L
ANION GAP SERPL CALC-SCNC: 7 MMOL/L (ref 0–18)
AST SERPL-CCNC: 22 U/L (ref 15–37)
BASOPHILS # BLD AUTO: 0.05 X10(3) UL (ref 0–0.2)
BASOPHILS NFR BLD AUTO: 0.5 %
BILIRUB SERPL-MCNC: 0.8 MG/DL (ref 0.1–2)
BUN BLD-MCNC: 15 MG/DL (ref 7–18)
BUN/CREAT SERPL: 19.7 (ref 10–20)
CALCIUM BLD-MCNC: 8.8 MG/DL (ref 8.5–10.1)
CHLORIDE SERPL-SCNC: 111 MMOL/L (ref 98–112)
CHOLEST SERPL-MCNC: 177 MG/DL (ref ?–200)
CO2 SERPL-SCNC: 21 MMOL/L (ref 21–32)
CREAT BLD-MCNC: 0.76 MG/DL
CREAT UR-SCNC: 124 MG/DL
DEPRECATED RDW RBC AUTO: 43.3 FL (ref 35.1–46.3)
EOSINOPHIL # BLD AUTO: 0.16 X10(3) UL (ref 0–0.7)
EOSINOPHIL NFR BLD AUTO: 1.7 %
ERYTHROCYTE [DISTWIDTH] IN BLOOD BY AUTOMATED COUNT: 12.2 % (ref 11–15)
EST. AVERAGE GLUCOSE BLD GHB EST-MCNC: 183 MG/DL (ref 68–126)
FASTING PATIENT LIPID ANSWER: YES
FASTING STATUS PATIENT QL REPORTED: YES
GFR SERPLBLD BASED ON 1.73 SQ M-ARVRAT: 118 ML/MIN/1.73M2 (ref 60–?)
GLOBULIN PLAS-MCNC: 4 G/DL (ref 2.8–4.4)
GLUCOSE BLD-MCNC: 209 MG/DL (ref 70–99)
HBA1C MFR BLD: 8 % (ref ?–5.7)
HCT VFR BLD AUTO: 48.7 %
HDLC SERPL-MCNC: 37 MG/DL (ref 40–59)
HGB BLD-MCNC: 16 G/DL
IMM GRANULOCYTES # BLD AUTO: 0.04 X10(3) UL (ref 0–1)
IMM GRANULOCYTES NFR BLD: 0.4 %
LDLC SERPL CALC-MCNC: 105 MG/DL (ref ?–100)
LYMPHOCYTES # BLD AUTO: 2.5 X10(3) UL (ref 1–4)
LYMPHOCYTES NFR BLD AUTO: 27 %
MCH RBC QN AUTO: 31.5 PG (ref 26–34)
MCHC RBC AUTO-ENTMCNC: 32.9 G/DL (ref 31–37)
MCV RBC AUTO: 95.9 FL
MICROALBUMIN UR-MCNC: 4.54 MG/DL
MICROALBUMIN/CREAT 24H UR-RTO: 36.6 UG/MG (ref ?–30)
MONOCYTES # BLD AUTO: 0.55 X10(3) UL (ref 0.1–1)
MONOCYTES NFR BLD AUTO: 5.9 %
NEUTROPHILS # BLD AUTO: 5.97 X10 (3) UL (ref 1.5–7.7)
NEUTROPHILS # BLD AUTO: 5.97 X10(3) UL (ref 1.5–7.7)
NEUTROPHILS NFR BLD AUTO: 64.5 %
NONHDLC SERPL-MCNC: 140 MG/DL (ref ?–130)
OSMOLALITY SERPL CALC.SUM OF ELEC: 295 MOSM/KG (ref 275–295)
PLATELET # BLD AUTO: 222 10(3)UL (ref 150–450)
POTASSIUM SERPL-SCNC: 4.1 MMOL/L (ref 3.5–5.1)
PROT SERPL-MCNC: 7.7 G/DL (ref 6.4–8.2)
RBC # BLD AUTO: 5.08 X10(6)UL
SODIUM SERPL-SCNC: 139 MMOL/L (ref 136–145)
TRIGL SERPL-MCNC: 200 MG/DL (ref 30–149)
TSI SER-ACNC: 1.35 MIU/ML (ref 0.36–3.74)
VLDLC SERPL CALC-MCNC: 34 MG/DL (ref 0–30)
WBC # BLD AUTO: 9.3 X10(3) UL (ref 4–11)

## 2023-05-12 PROCEDURE — 84443 ASSAY THYROID STIM HORMONE: CPT | Performed by: INTERNAL MEDICINE

## 2023-05-12 PROCEDURE — 3060F POS MICROALBUMINURIA REV: CPT | Performed by: INTERNAL MEDICINE

## 2023-05-12 PROCEDURE — 3052F HG A1C>EQUAL 8.0%<EQUAL 9.0%: CPT | Performed by: INTERNAL MEDICINE

## 2023-05-12 PROCEDURE — 82043 UR ALBUMIN QUANTITATIVE: CPT | Performed by: INTERNAL MEDICINE

## 2023-05-12 PROCEDURE — 82570 ASSAY OF URINE CREATININE: CPT | Performed by: INTERNAL MEDICINE

## 2023-05-12 PROCEDURE — 80053 COMPREHEN METABOLIC PANEL: CPT | Performed by: INTERNAL MEDICINE

## 2023-05-12 PROCEDURE — 83036 HEMOGLOBIN GLYCOSYLATED A1C: CPT | Performed by: INTERNAL MEDICINE

## 2023-05-12 PROCEDURE — 80061 LIPID PANEL: CPT | Performed by: INTERNAL MEDICINE

## 2023-05-12 PROCEDURE — 3061F NEG MICROALBUMINURIA REV: CPT | Performed by: INTERNAL MEDICINE

## 2023-05-12 PROCEDURE — 85025 COMPLETE CBC W/AUTO DIFF WBC: CPT | Performed by: INTERNAL MEDICINE

## 2023-06-01 ENCOUNTER — OFFICE VISIT (OUTPATIENT)
Dept: INTERNAL MEDICINE CLINIC | Facility: CLINIC | Age: 38
End: 2023-06-01
Payer: COMMERCIAL

## 2023-06-01 VITALS
DIASTOLIC BLOOD PRESSURE: 74 MMHG | OXYGEN SATURATION: 98 % | WEIGHT: 263 LBS | RESPIRATION RATE: 18 BRPM | SYSTOLIC BLOOD PRESSURE: 116 MMHG | HEART RATE: 82 BPM | HEIGHT: 73 IN | BODY MASS INDEX: 34.85 KG/M2 | TEMPERATURE: 97 F

## 2023-06-01 DIAGNOSIS — F41.9 ANXIETY: ICD-10-CM

## 2023-06-01 DIAGNOSIS — E11.9 TYPE 2 DIABETES MELLITUS WITHOUT COMPLICATION, WITHOUT LONG-TERM CURRENT USE OF INSULIN (HCC): ICD-10-CM

## 2023-06-01 DIAGNOSIS — E66.9 CLASS 1 OBESITY: ICD-10-CM

## 2023-06-01 DIAGNOSIS — F32.A DEPRESSION, UNSPECIFIED DEPRESSION TYPE: ICD-10-CM

## 2023-06-01 DIAGNOSIS — Z87.891 HISTORY OF TOBACCO USE DISORDER: ICD-10-CM

## 2023-06-01 DIAGNOSIS — Z00.00 ROUTINE GENERAL MEDICAL EXAMINATION AT A HEALTH CARE FACILITY: Primary | ICD-10-CM

## 2023-06-01 PROCEDURE — 3078F DIAST BP <80 MM HG: CPT | Performed by: INTERNAL MEDICINE

## 2023-06-01 PROCEDURE — 3074F SYST BP LT 130 MM HG: CPT | Performed by: INTERNAL MEDICINE

## 2023-06-01 PROCEDURE — 99395 PREV VISIT EST AGE 18-39: CPT | Performed by: INTERNAL MEDICINE

## 2023-06-01 PROCEDURE — 3008F BODY MASS INDEX DOCD: CPT | Performed by: INTERNAL MEDICINE

## 2023-07-07 RX ORDER — BUPROPION HYDROCHLORIDE 300 MG/1
300 TABLET ORAL DAILY
Qty: 90 TABLET | Refills: 0 | Status: SHIPPED | OUTPATIENT
Start: 2023-07-07

## 2023-07-07 NOTE — TELEPHONE ENCOUNTER
Last VISIT 06/01/2023    Last CPE 06/01/2023    Last REFILL  Medication Quantity Refills Start End   buPROPion  MG Oral Tablet 24 Hr 90 tablet 0 10/20/2022    Sig:   Take 1 tablet (300 mg total) by mouth daily. Last LABS  CBC,CMP,Lipid,HGA1C,TSH w Reflex, Urine Microalb- 05/12/2023    No future appointments. Per PROTOCOL? Refill pended     Please Approve or Deny.

## 2023-07-26 ENCOUNTER — TELEPHONE (OUTPATIENT)
Dept: INTERNAL MEDICINE CLINIC | Facility: CLINIC | Age: 38
End: 2023-07-26

## 2023-07-26 NOTE — TELEPHONE ENCOUNTER
Pt needing refill of below to go to [facility] on file.       METFORMIN HCL 1000 MG Oral Tab 30 tablet 0 5/2/2022     Sig: TAKE 1 TABLET(1000 MG) BY MOUTH DAILY WITH BREAKFAST

## 2023-07-26 NOTE — TELEPHONE ENCOUNTER
Pt states at lov with AD he was told to see psychiatry service but he wasn't provided any information for them to call and schedule. Also regarding his DM, was he to follow-up with AD, see diabetes clinic?  He would like a phone call back with next steps after 4pm

## 2023-07-27 NOTE — TELEPHONE ENCOUNTER
Requested Prescriptions     Pending Prescriptions Disp Refills    metFORMIN HCl 1000 MG Oral Tab 30 tablet 0     Sig: Take 1 tablet (1,000 mg total) by mouth daily with breakfast.       LOV: 6/1/23 AD    LAST PHYSICAL: 6/1/23 A.D    A.D- DM2: Uncontrolled, with HbA1c of 8.0%  Resume Metformin 1g BID    LAST REFILL: metformin-30-5/2/22    LAST LAB: 5/12/23

## 2023-08-01 NOTE — TELEPHONE ENCOUNTER
Pt returned call. Pt has not heard from [facility] specialist. Informed him we will send a message to them to ask them to reach out. Per AD LOV note, he wanted pt to rpt DM labs in 3 mos, no f/u mentioned. Advised pt to complete these in beginning of sept. Further recs to follow. Pt stated understanding and agreed to plan. Sid Tran, would you be able to reach out to pt regarding [facility] referral that was placed 6/1/23? Thank you!

## 2023-09-06 RX ORDER — TRAZODONE HYDROCHLORIDE 50 MG/1
50 TABLET ORAL NIGHTLY
Qty: 90 TABLET | Refills: 0 | Status: SHIPPED | OUTPATIENT
Start: 2023-09-06

## 2023-09-23 PROCEDURE — 3051F HG A1C>EQUAL 7.0%<8.0%: CPT | Performed by: INTERNAL MEDICINE

## 2023-09-24 LAB
ALBUMIN/GLOBULIN RATIO: 1.5 (CALC) (ref 1–2.5)
ALBUMIN: 4.4 G/DL (ref 3.6–5.1)
ALKALINE PHOSPHATASE: 79 U/L (ref 36–130)
ALT: 28 U/L (ref 9–46)
AST: 15 U/L (ref 10–40)
BILIRUBIN, TOTAL: 0.7 MG/DL (ref 0.2–1.2)
BUN: 10 MG/DL (ref 7–25)
CALCIUM: 9.3 MG/DL (ref 8.6–10.3)
CARBON DIOXIDE: 24 MMOL/L (ref 20–32)
CHLORIDE: 105 MMOL/L (ref 98–110)
CHOL/HDLC RATIO: 4.4 (CALC)
CHOLESTEROL, TOTAL: 192 MG/DL
CREATININE: 0.74 MG/DL (ref 0.6–1.26)
EGFR: 119 ML/MIN/1.73M2
GLOBULIN: 2.9 G/DL (CALC) (ref 1.9–3.7)
GLUCOSE: 169 MG/DL (ref 65–99)
HDL CHOLESTEROL: 44 MG/DL
HEMOGLOBIN A1C: 7 % OF TOTAL HGB
LDL-CHOLESTEROL: 122 MG/DL (CALC)
NON-HDL CHOLESTEROL: 148 MG/DL (CALC)
POTASSIUM: 4.4 MMOL/L (ref 3.5–5.3)
PROTEIN, TOTAL: 7.3 G/DL (ref 6.1–8.1)
SODIUM: 139 MMOL/L (ref 135–146)
TRIGLYCERIDES: 143 MG/DL

## 2023-09-27 ENCOUNTER — OFFICE VISIT (OUTPATIENT)
Dept: INTERNAL MEDICINE CLINIC | Facility: CLINIC | Age: 38
End: 2023-09-27
Payer: COMMERCIAL

## 2023-09-27 VITALS
HEART RATE: 70 BPM | WEIGHT: 271.38 LBS | DIASTOLIC BLOOD PRESSURE: 64 MMHG | RESPIRATION RATE: 12 BRPM | SYSTOLIC BLOOD PRESSURE: 110 MMHG | OXYGEN SATURATION: 100 % | HEIGHT: 73 IN | BODY MASS INDEX: 35.97 KG/M2

## 2023-09-27 DIAGNOSIS — R80.9 TYPE 2 DIABETES MELLITUS WITH MICROALBUMINURIA, WITHOUT LONG-TERM CURRENT USE OF INSULIN: Primary | ICD-10-CM

## 2023-09-27 DIAGNOSIS — E11.29 TYPE 2 DIABETES MELLITUS WITH MICROALBUMINURIA, WITHOUT LONG-TERM CURRENT USE OF INSULIN: Primary | ICD-10-CM

## 2023-09-27 DIAGNOSIS — F32.A DEPRESSION, UNSPECIFIED DEPRESSION TYPE: ICD-10-CM

## 2023-09-27 DIAGNOSIS — E66.9 CLASS 2 OBESITY: ICD-10-CM

## 2023-09-27 DIAGNOSIS — F41.9 ANXIETY: ICD-10-CM

## 2023-09-27 PROBLEM — E66.812 CLASS 2 OBESITY: Status: ACTIVE | Noted: 2022-03-29

## 2023-09-27 PROCEDURE — 99214 OFFICE O/P EST MOD 30 MIN: CPT | Performed by: INTERNAL MEDICINE

## 2023-09-27 PROCEDURE — 3078F DIAST BP <80 MM HG: CPT | Performed by: INTERNAL MEDICINE

## 2023-09-27 PROCEDURE — 3008F BODY MASS INDEX DOCD: CPT | Performed by: INTERNAL MEDICINE

## 2023-09-27 PROCEDURE — 3074F SYST BP LT 130 MM HG: CPT | Performed by: INTERNAL MEDICINE

## 2023-09-27 RX ORDER — LOSARTAN POTASSIUM 25 MG/1
12.5 TABLET ORAL DAILY
Qty: 45 TABLET | Refills: 0 | Status: SHIPPED | OUTPATIENT
Start: 2023-09-27 | End: 2023-12-26

## 2023-09-27 RX ORDER — METFORMIN HYDROCHLORIDE 750 MG/1
750 TABLET, EXTENDED RELEASE ORAL
Qty: 90 TABLET | Refills: 1 | Status: SHIPPED | OUTPATIENT
Start: 2023-09-27

## 2023-10-10 RX ORDER — BUPROPION HYDROCHLORIDE 300 MG/1
300 TABLET ORAL DAILY
Qty: 90 TABLET | Refills: 0 | Status: SHIPPED | OUTPATIENT
Start: 2023-10-10

## 2023-10-10 NOTE — TELEPHONE ENCOUNTER
Last OV? 09/27/2023    Last CPE? 06/01/2023    Last Refill? Medication Quantity Refills Start End   buPROPion  MG Oral Tablet 24 Hr 90 tablet 0 7/7/2023    Sig:   Take 1 tablet (300 mg total) by mouth daily. Last Labs? CMP,Lipid,HGA1C- 09/24/2023    Future Appointments   Date Time Provider Alexandria Ramirez   2/28/2024  8:00 AM Paul Zimmerman MD [facility]       Per Protocol?    Refill pended    Please Approve or Deny

## 2023-11-22 NOTE — TELEPHONE ENCOUNTER
PASSED per protocol, refill sent.   Last PE 6/1/23  Future Appointments   Date Time Provider Alexandria Ramirez   2/28/2024  8:00 AM Wes Jones MD [facility]

## 2024-02-17 ENCOUNTER — ANESTHESIA EVENT (OUTPATIENT)
Dept: SURGERY | Facility: HOSPITAL | Age: 39
End: 2024-02-17
Payer: COMMERCIAL

## 2024-02-17 ENCOUNTER — APPOINTMENT (OUTPATIENT)
Dept: CT IMAGING | Facility: HOSPITAL | Age: 39
End: 2024-02-17
Attending: EMERGENCY MEDICINE
Payer: COMMERCIAL

## 2024-02-17 ENCOUNTER — APPOINTMENT (OUTPATIENT)
Dept: GENERAL RADIOLOGY | Facility: HOSPITAL | Age: 39
End: 2024-02-17
Payer: COMMERCIAL

## 2024-02-17 ENCOUNTER — ANESTHESIA (OUTPATIENT)
Dept: SURGERY | Facility: HOSPITAL | Age: 39
End: 2024-02-17
Payer: COMMERCIAL

## 2024-02-17 ENCOUNTER — HOSPITAL ENCOUNTER (INPATIENT)
Facility: HOSPITAL | Age: 39
LOS: 7 days | Discharge: HOME HEALTH | End: 2024-02-24
Attending: EMERGENCY MEDICINE | Admitting: HOSPITALIST
Payer: COMMERCIAL

## 2024-02-17 DIAGNOSIS — R10.9 ACUTE LEFT FLANK PAIN: ICD-10-CM

## 2024-02-17 DIAGNOSIS — K63.1 BOWEL PERFORATION (HCC): Primary | ICD-10-CM

## 2024-02-17 PROBLEM — R73.9 HYPERGLYCEMIA: Status: ACTIVE | Noted: 2024-02-17

## 2024-02-17 PROBLEM — D72.829 LEUKOCYTOSIS: Status: ACTIVE | Noted: 2024-02-17

## 2024-02-17 PROBLEM — E87.6 HYPOKALEMIA: Status: ACTIVE | Noted: 2024-02-17

## 2024-02-17 LAB
ALBUMIN SERPL-MCNC: 2.1 G/DL (ref 3.4–5)
ALBUMIN SERPL-MCNC: 2.6 G/DL (ref 3.4–5)
ALBUMIN/GLOB SERPL: 0.5 {RATIO} (ref 1–2)
ALBUMIN/GLOB SERPL: 0.5 {RATIO} (ref 1–2)
ALP LIVER SERPL-CCNC: 111 U/L
ALP LIVER SERPL-CCNC: 94 U/L
ALT SERPL-CCNC: 13 U/L
ALT SERPL-CCNC: 13 U/L
ANION GAP SERPL CALC-SCNC: 7 MMOL/L (ref 0–18)
ANION GAP SERPL CALC-SCNC: 8 MMOL/L (ref 0–18)
AST SERPL-CCNC: 14 U/L (ref 15–37)
AST SERPL-CCNC: 8 U/L (ref 15–37)
BASOPHILS # BLD AUTO: 0.04 X10(3) UL (ref 0–0.2)
BASOPHILS NFR BLD AUTO: 0.2 %
BILIRUB SERPL-MCNC: 1.1 MG/DL (ref 0.1–2)
BILIRUB SERPL-MCNC: 1.1 MG/DL (ref 0.1–2)
BILIRUB UR QL STRIP.AUTO: NEGATIVE
BUN BLD-MCNC: 14 MG/DL (ref 9–23)
BUN BLD-MCNC: 8 MG/DL (ref 9–23)
CALCIUM BLD-MCNC: 8.4 MG/DL (ref 8.5–10.1)
CALCIUM BLD-MCNC: 9.4 MG/DL (ref 8.5–10.1)
CHLORIDE SERPL-SCNC: 104 MMOL/L (ref 98–112)
CHLORIDE SERPL-SCNC: 110 MMOL/L (ref 98–112)
CO2 SERPL-SCNC: 21 MMOL/L (ref 21–32)
CO2 SERPL-SCNC: 25 MMOL/L (ref 21–32)
COLOR UR AUTO: YELLOW
CREAT BLD-MCNC: 0.62 MG/DL
CREAT BLD-MCNC: 0.89 MG/DL
EGFRCR SERPLBLD CKD-EPI 2021: 112 ML/MIN/1.73M2 (ref 60–?)
EGFRCR SERPLBLD CKD-EPI 2021: 125 ML/MIN/1.73M2 (ref 60–?)
EOSINOPHIL # BLD AUTO: 0.06 X10(3) UL (ref 0–0.7)
EOSINOPHIL NFR BLD AUTO: 0.3 %
ERYTHROCYTE [DISTWIDTH] IN BLOOD BY AUTOMATED COUNT: 12.9 %
ERYTHROCYTE [DISTWIDTH] IN BLOOD BY AUTOMATED COUNT: 13.1 %
EST. AVERAGE GLUCOSE BLD GHB EST-MCNC: 237 MG/DL (ref 68–126)
GLOBULIN PLAS-MCNC: 3.9 G/DL (ref 2.8–4.4)
GLOBULIN PLAS-MCNC: 5.1 G/DL (ref 2.8–4.4)
GLUCOSE BLD-MCNC: 212 MG/DL (ref 70–99)
GLUCOSE BLD-MCNC: 222 MG/DL (ref 70–99)
GLUCOSE BLD-MCNC: 241 MG/DL (ref 70–99)
GLUCOSE BLD-MCNC: 250 MG/DL (ref 70–99)
GLUCOSE UR STRIP.AUTO-MCNC: >1000 MG/DL
HBA1C MFR BLD: 9.9 % (ref ?–5.7)
HCT VFR BLD AUTO: 34.6 %
HCT VFR BLD AUTO: 40.1 %
HGB BLD-MCNC: 11.2 G/DL
HGB BLD-MCNC: 13.4 G/DL
IMM GRANULOCYTES # BLD AUTO: 0.21 X10(3) UL (ref 0–1)
IMM GRANULOCYTES NFR BLD: 1.1 %
KETONES UR STRIP.AUTO-MCNC: 80 MG/DL
LACTATE SERPL-SCNC: 1.4 MMOL/L (ref 0.4–2)
LACTATE SERPL-SCNC: 1.5 MMOL/L (ref 0.4–2)
LEUKOCYTE ESTERASE UR QL STRIP.AUTO: NEGATIVE
LIPASE SERPL-CCNC: 13 U/L (ref 13–75)
LYMPHOCYTES # BLD AUTO: 0.81 X10(3) UL (ref 1–4)
LYMPHOCYTES NFR BLD AUTO: 4.1 %
MCH RBC QN AUTO: 29.6 PG (ref 26–34)
MCH RBC QN AUTO: 29.7 PG (ref 26–34)
MCHC RBC AUTO-ENTMCNC: 32.4 G/DL (ref 31–37)
MCHC RBC AUTO-ENTMCNC: 33.4 G/DL (ref 31–37)
MCV RBC AUTO: 88.7 FL
MCV RBC AUTO: 91.8 FL
MONOCYTES # BLD AUTO: 0.97 X10(3) UL (ref 0.1–1)
MONOCYTES NFR BLD AUTO: 5 %
MRSA DNA SPEC QL NAA+PROBE: NEGATIVE
NEUTROPHILS # BLD AUTO: 17.47 X10 (3) UL (ref 1.5–7.7)
NEUTROPHILS # BLD AUTO: 17.47 X10(3) UL (ref 1.5–7.7)
NEUTROPHILS NFR BLD AUTO: 89.3 %
NITRITE UR QL STRIP.AUTO: NEGATIVE
OSMOLALITY SERPL CALC.SUM OF ELEC: 291 MOSM/KG (ref 275–295)
OSMOLALITY SERPL CALC.SUM OF ELEC: 294 MOSM/KG (ref 275–295)
PH UR STRIP.AUTO: 6 [PH] (ref 5–8)
PLATELET # BLD AUTO: 264 10(3)UL (ref 150–450)
PLATELET # BLD AUTO: 315 10(3)UL (ref 150–450)
POTASSIUM SERPL-SCNC: 3 MMOL/L (ref 3.5–5.1)
POTASSIUM SERPL-SCNC: 3.6 MMOL/L (ref 3.5–5.1)
PROT SERPL-MCNC: 6 G/DL (ref 6.4–8.2)
PROT SERPL-MCNC: 7.7 G/DL (ref 6.4–8.2)
PROT UR STRIP.AUTO-MCNC: 100 MG/DL
RBC # BLD AUTO: 3.77 X10(6)UL
RBC # BLD AUTO: 4.52 X10(6)UL
RBC UR QL AUTO: NEGATIVE
SODIUM SERPL-SCNC: 136 MMOL/L (ref 136–145)
SODIUM SERPL-SCNC: 139 MMOL/L (ref 136–145)
SP GR UR STRIP.AUTO: >1.03 (ref 1–1.03)
UROBILINOGEN UR STRIP.AUTO-MCNC: 2 MG/DL
WBC # BLD AUTO: 16.3 X10(3) UL (ref 4–11)
WBC # BLD AUTO: 19.6 X10(3) UL (ref 4–11)

## 2024-02-17 PROCEDURE — 0DTN0ZZ RESECTION OF SIGMOID COLON, OPEN APPROACH: ICD-10-PCS | Performed by: STUDENT IN AN ORGANIZED HEALTH CARE EDUCATION/TRAINING PROGRAM

## 2024-02-17 PROCEDURE — 0W9H0ZZ DRAINAGE OF RETROPERITONEUM, OPEN APPROACH: ICD-10-PCS | Performed by: STUDENT IN AN ORGANIZED HEALTH CARE EDUCATION/TRAINING PROGRAM

## 2024-02-17 PROCEDURE — 44146 PARTIAL REMOVAL OF COLON: CPT | Performed by: STUDENT IN AN ORGANIZED HEALTH CARE EDUCATION/TRAINING PROGRAM

## 2024-02-17 PROCEDURE — 49060 DRAIN OPEN RETROPERI ABSCESS: CPT | Performed by: STUDENT IN AN ORGANIZED HEALTH CARE EDUCATION/TRAINING PROGRAM

## 2024-02-17 PROCEDURE — 99223 1ST HOSP IP/OBS HIGH 75: CPT | Performed by: STUDENT IN AN ORGANIZED HEALTH CARE EDUCATION/TRAINING PROGRAM

## 2024-02-17 PROCEDURE — 99291 CRITICAL CARE FIRST HOUR: CPT

## 2024-02-17 PROCEDURE — 44139 MOBILIZATION OF COLON: CPT | Performed by: STUDENT IN AN ORGANIZED HEALTH CARE EDUCATION/TRAINING PROGRAM

## 2024-02-17 PROCEDURE — 0DBM0ZZ EXCISION OF DESCENDING COLON, OPEN APPROACH: ICD-10-PCS | Performed by: STUDENT IN AN ORGANIZED HEALTH CARE EDUCATION/TRAINING PROGRAM

## 2024-02-17 PROCEDURE — 76942 ECHO GUIDE FOR BIOPSY: CPT | Performed by: ANESTHESIOLOGY

## 2024-02-17 PROCEDURE — 74178 CT ABD&PLV WO CNTR FLWD CNTR: CPT | Performed by: EMERGENCY MEDICINE

## 2024-02-17 PROCEDURE — 49060 DRAIN OPEN RETROPERI ABSCESS: CPT

## 2024-02-17 PROCEDURE — 71045 X-RAY EXAM CHEST 1 VIEW: CPT

## 2024-02-17 PROCEDURE — 3E0T3BZ INTRODUCTION OF ANESTHETIC AGENT INTO PERIPHERAL NERVES AND PLEXI, PERCUTANEOUS APPROACH: ICD-10-PCS | Performed by: STUDENT IN AN ORGANIZED HEALTH CARE EDUCATION/TRAINING PROGRAM

## 2024-02-17 PROCEDURE — 05HM33Z INSERTION OF INFUSION DEVICE INTO RIGHT INTERNAL JUGULAR VEIN, PERCUTANEOUS APPROACH: ICD-10-PCS | Performed by: STUDENT IN AN ORGANIZED HEALTH CARE EDUCATION/TRAINING PROGRAM

## 2024-02-17 PROCEDURE — 44146 PARTIAL REMOVAL OF COLON: CPT

## 2024-02-17 PROCEDURE — 0D1M0Z4 BYPASS DESCENDING COLON TO CUTANEOUS, OPEN APPROACH: ICD-10-PCS | Performed by: STUDENT IN AN ORGANIZED HEALTH CARE EDUCATION/TRAINING PROGRAM

## 2024-02-17 PROCEDURE — P9045 ALBUMIN (HUMAN), 5%, 250 ML: HCPCS | Performed by: STUDENT IN AN ORGANIZED HEALTH CARE EDUCATION/TRAINING PROGRAM

## 2024-02-17 PROCEDURE — B543ZZA ULTRASONOGRAPHY OF RIGHT JUGULAR VEINS, GUIDANCE: ICD-10-PCS | Performed by: STUDENT IN AN ORGANIZED HEALTH CARE EDUCATION/TRAINING PROGRAM

## 2024-02-17 PROCEDURE — 44139 MOBILIZATION OF COLON: CPT

## 2024-02-17 PROCEDURE — 3E033XZ INTRODUCTION OF VASOPRESSOR INTO PERIPHERAL VEIN, PERCUTANEOUS APPROACH: ICD-10-PCS | Performed by: INTERNAL MEDICINE

## 2024-02-17 RX ORDER — PHENYLEPHRINE HCL 10 MG/ML
VIAL (ML) INJECTION AS NEEDED
Status: DISCONTINUED | OUTPATIENT
Start: 2024-02-17 | End: 2024-02-17 | Stop reason: SURG

## 2024-02-17 RX ORDER — ALBUMIN, HUMAN INJ 5% 5 %
500 SOLUTION INTRAVENOUS ONCE
Status: COMPLETED | OUTPATIENT
Start: 2024-02-17 | End: 2024-02-17

## 2024-02-17 RX ORDER — ONDANSETRON 2 MG/ML
4 INJECTION INTRAMUSCULAR; INTRAVENOUS EVERY 6 HOURS PRN
Status: DISCONTINUED | OUTPATIENT
Start: 2024-02-17 | End: 2024-02-19

## 2024-02-17 RX ORDER — NICOTINE POLACRILEX 4 MG
30 LOZENGE BUCCAL
Status: DISCONTINUED | OUTPATIENT
Start: 2024-02-17 | End: 2024-02-24

## 2024-02-17 RX ORDER — NICOTINE POLACRILEX 4 MG
15 LOZENGE BUCCAL
Status: DISCONTINUED | OUTPATIENT
Start: 2024-02-17 | End: 2024-02-18

## 2024-02-17 RX ORDER — NALOXONE HYDROCHLORIDE 0.4 MG/ML
0.08 INJECTION, SOLUTION INTRAMUSCULAR; INTRAVENOUS; SUBCUTANEOUS AS NEEDED
Status: ACTIVE | OUTPATIENT
Start: 2024-02-17 | End: 2024-02-18

## 2024-02-17 RX ORDER — ACETAMINOPHEN 500 MG
1000 TABLET ORAL ONCE AS NEEDED
Status: ACTIVE | OUTPATIENT
Start: 2024-02-17 | End: 2024-02-17

## 2024-02-17 RX ORDER — ENOXAPARIN SODIUM 100 MG/ML
40 INJECTION SUBCUTANEOUS DAILY
Status: DISCONTINUED | OUTPATIENT
Start: 2024-02-18 | End: 2024-02-24

## 2024-02-17 RX ORDER — LIDOCAINE HYDROCHLORIDE 10 MG/ML
INJECTION, SOLUTION EPIDURAL; INFILTRATION; INTRACAUDAL; PERINEURAL AS NEEDED
Status: DISCONTINUED | OUTPATIENT
Start: 2024-02-17 | End: 2024-02-17 | Stop reason: SURG

## 2024-02-17 RX ORDER — DEXTROSE MONOHYDRATE 25 G/50ML
50 INJECTION, SOLUTION INTRAVENOUS
Status: DISCONTINUED | OUTPATIENT
Start: 2024-02-17 | End: 2024-02-18

## 2024-02-17 RX ORDER — INSULIN ASPART 100 [IU]/ML
INJECTION, SOLUTION INTRAVENOUS; SUBCUTANEOUS ONCE
Status: DISCONTINUED | OUTPATIENT
Start: 2024-02-17 | End: 2024-02-24

## 2024-02-17 RX ORDER — SODIUM CHLORIDE, SODIUM LACTATE, POTASSIUM CHLORIDE, CALCIUM CHLORIDE 600; 310; 30; 20 MG/100ML; MG/100ML; MG/100ML; MG/100ML
INJECTION, SOLUTION INTRAVENOUS CONTINUOUS PRN
Status: DISCONTINUED | OUTPATIENT
Start: 2024-02-17 | End: 2024-02-17 | Stop reason: SURG

## 2024-02-17 RX ORDER — TRAZODONE HYDROCHLORIDE 50 MG/1
50 TABLET ORAL NIGHTLY PRN
Status: DISCONTINUED | OUTPATIENT
Start: 2024-02-17 | End: 2024-02-24

## 2024-02-17 RX ORDER — ACETAMINOPHEN 325 MG/1
650 TABLET ORAL EVERY 4 HOURS PRN
Status: DISCONTINUED | OUTPATIENT
Start: 2024-02-17 | End: 2024-02-20

## 2024-02-17 RX ORDER — HYDROMORPHONE HYDROCHLORIDE 1 MG/ML
0.4 INJECTION, SOLUTION INTRAMUSCULAR; INTRAVENOUS; SUBCUTANEOUS EVERY 2 HOUR PRN
Status: DISCONTINUED | OUTPATIENT
Start: 2024-02-17 | End: 2024-02-19

## 2024-02-17 RX ORDER — HYDROMORPHONE HYDROCHLORIDE 1 MG/ML
0.2 INJECTION, SOLUTION INTRAMUSCULAR; INTRAVENOUS; SUBCUTANEOUS EVERY 5 MIN PRN
Status: ACTIVE | OUTPATIENT
Start: 2024-02-17 | End: 2024-02-18

## 2024-02-17 RX ORDER — ONDANSETRON 2 MG/ML
4 INJECTION INTRAMUSCULAR; INTRAVENOUS EVERY 6 HOURS PRN
Status: DISCONTINUED | OUTPATIENT
Start: 2024-02-17 | End: 2024-02-18

## 2024-02-17 RX ORDER — ONDANSETRON 2 MG/ML
4 INJECTION INTRAMUSCULAR; INTRAVENOUS EVERY 4 HOURS PRN
Status: ACTIVE | OUTPATIENT
Start: 2024-02-17 | End: 2024-02-17

## 2024-02-17 RX ORDER — ROCURONIUM BROMIDE 10 MG/ML
INJECTION, SOLUTION INTRAVENOUS AS NEEDED
Status: DISCONTINUED | OUTPATIENT
Start: 2024-02-17 | End: 2024-02-17 | Stop reason: SURG

## 2024-02-17 RX ORDER — HYDROMORPHONE HYDROCHLORIDE 1 MG/ML
0.4 INJECTION, SOLUTION INTRAMUSCULAR; INTRAVENOUS; SUBCUTANEOUS EVERY 5 MIN PRN
Status: ACTIVE | OUTPATIENT
Start: 2024-02-17 | End: 2024-02-18

## 2024-02-17 RX ORDER — HYDROCODONE BITARTRATE AND ACETAMINOPHEN 5; 325 MG/1; MG/1
2 TABLET ORAL EVERY 4 HOURS PRN
Status: DISCONTINUED | OUTPATIENT
Start: 2024-02-17 | End: 2024-02-20

## 2024-02-17 RX ORDER — HYDROMORPHONE HYDROCHLORIDE 1 MG/ML
0.5 INJECTION, SOLUTION INTRAMUSCULAR; INTRAVENOUS; SUBCUTANEOUS EVERY 30 MIN PRN
Status: DISCONTINUED | OUTPATIENT
Start: 2024-02-17 | End: 2024-02-18 | Stop reason: HOSPADM

## 2024-02-17 RX ORDER — NICOTINE POLACRILEX 4 MG
15 LOZENGE BUCCAL
Status: DISCONTINUED | OUTPATIENT
Start: 2024-02-17 | End: 2024-02-24

## 2024-02-17 RX ORDER — KETAMINE HYDROCHLORIDE 50 MG/ML
INJECTION, SOLUTION INTRAMUSCULAR; INTRAVENOUS AS NEEDED
Status: DISCONTINUED | OUTPATIENT
Start: 2024-02-17 | End: 2024-02-17 | Stop reason: SURG

## 2024-02-17 RX ORDER — SENNOSIDES 8.6 MG
17.2 TABLET ORAL NIGHTLY PRN
Status: DISCONTINUED | OUTPATIENT
Start: 2024-02-17 | End: 2024-02-24

## 2024-02-17 RX ORDER — ACETAMINOPHEN 10 MG/ML
1000 INJECTION, SOLUTION INTRAVENOUS EVERY 6 HOURS PRN
Status: DISCONTINUED | OUTPATIENT
Start: 2024-02-17 | End: 2024-02-20

## 2024-02-17 RX ORDER — SODIUM CHLORIDE, SODIUM LACTATE, POTASSIUM CHLORIDE, CALCIUM CHLORIDE 600; 310; 30; 20 MG/100ML; MG/100ML; MG/100ML; MG/100ML
INJECTION, SOLUTION INTRAVENOUS CONTINUOUS
Status: DISCONTINUED | OUTPATIENT
Start: 2024-02-17 | End: 2024-02-21

## 2024-02-17 RX ORDER — PROCHLORPERAZINE EDISYLATE 5 MG/ML
5 INJECTION INTRAMUSCULAR; INTRAVENOUS EVERY 8 HOURS PRN
Status: DISCONTINUED | OUTPATIENT
Start: 2024-02-17 | End: 2024-02-24

## 2024-02-17 RX ORDER — HYDROMORPHONE HYDROCHLORIDE 1 MG/ML
0.6 INJECTION, SOLUTION INTRAMUSCULAR; INTRAVENOUS; SUBCUTANEOUS EVERY 5 MIN PRN
Status: ACTIVE | OUTPATIENT
Start: 2024-02-17 | End: 2024-02-18

## 2024-02-17 RX ORDER — HYDROCODONE BITARTRATE AND ACETAMINOPHEN 5; 325 MG/1; MG/1
1 TABLET ORAL EVERY 4 HOURS PRN
Status: DISCONTINUED | OUTPATIENT
Start: 2024-02-17 | End: 2024-02-20

## 2024-02-17 RX ORDER — SODIUM CHLORIDE, SODIUM LACTATE, POTASSIUM CHLORIDE, CALCIUM CHLORIDE 600; 310; 30; 20 MG/100ML; MG/100ML; MG/100ML; MG/100ML
INJECTION, SOLUTION INTRAVENOUS CONTINUOUS
Status: DISCONTINUED | OUTPATIENT
Start: 2024-02-17 | End: 2024-02-17

## 2024-02-17 RX ORDER — METRONIDAZOLE 500 MG/100ML
INJECTION, SOLUTION INTRAVENOUS AS NEEDED
Status: DISCONTINUED | OUTPATIENT
Start: 2024-02-17 | End: 2024-02-17 | Stop reason: SURG

## 2024-02-17 RX ORDER — BENZONATATE 100 MG/1
200 CAPSULE ORAL 3 TIMES DAILY PRN
Status: DISCONTINUED | OUTPATIENT
Start: 2024-02-17 | End: 2024-02-24

## 2024-02-17 RX ORDER — CALCIUM CHLORIDE 100 MG/ML
INJECTION INTRAVENOUS; INTRAVENTRICULAR AS NEEDED
Status: DISCONTINUED | OUTPATIENT
Start: 2024-02-17 | End: 2024-02-17 | Stop reason: SURG

## 2024-02-17 RX ORDER — NICOTINE POLACRILEX 4 MG
30 LOZENGE BUCCAL
Status: DISCONTINUED | OUTPATIENT
Start: 2024-02-17 | End: 2024-02-18

## 2024-02-17 RX ORDER — POTASSIUM CHLORIDE 20 MEQ/1
40 TABLET, EXTENDED RELEASE ORAL ONCE
Status: DISCONTINUED | OUTPATIENT
Start: 2024-02-17 | End: 2024-02-21

## 2024-02-17 RX ORDER — HYDROMORPHONE HYDROCHLORIDE 1 MG/ML
0.2 INJECTION, SOLUTION INTRAMUSCULAR; INTRAVENOUS; SUBCUTANEOUS EVERY 2 HOUR PRN
Status: DISCONTINUED | OUTPATIENT
Start: 2024-02-17 | End: 2024-02-19

## 2024-02-17 RX ORDER — HYDROMORPHONE HYDROCHLORIDE 1 MG/ML
0.8 INJECTION, SOLUTION INTRAMUSCULAR; INTRAVENOUS; SUBCUTANEOUS EVERY 2 HOUR PRN
Status: DISCONTINUED | OUTPATIENT
Start: 2024-02-17 | End: 2024-02-19

## 2024-02-17 RX ORDER — ECHINACEA PURPUREA EXTRACT 125 MG
1 TABLET ORAL
Status: DISCONTINUED | OUTPATIENT
Start: 2024-02-17 | End: 2024-02-24

## 2024-02-17 RX ORDER — COVID-19 ANTIGEN TEST
220 KIT MISCELLANEOUS DAILY PRN
COMMUNITY
End: 2024-02-24

## 2024-02-17 RX ORDER — DEXTROSE MONOHYDRATE 25 G/50ML
50 INJECTION, SOLUTION INTRAVENOUS
Status: DISCONTINUED | OUTPATIENT
Start: 2024-02-17 | End: 2024-02-24

## 2024-02-17 RX ORDER — HYDROMORPHONE HYDROCHLORIDE 1 MG/ML
0.5 INJECTION, SOLUTION INTRAMUSCULAR; INTRAVENOUS; SUBCUTANEOUS EVERY 30 MIN PRN
Status: ACTIVE | OUTPATIENT
Start: 2024-02-17 | End: 2024-02-17

## 2024-02-17 RX ORDER — PROCHLORPERAZINE EDISYLATE 5 MG/ML
5 INJECTION INTRAMUSCULAR; INTRAVENOUS EVERY 8 HOURS PRN
Status: DISCONTINUED | OUTPATIENT
Start: 2024-02-17 | End: 2024-02-17

## 2024-02-17 RX ORDER — SODIUM CHLORIDE 9 MG/ML
INJECTION, SOLUTION INTRAVENOUS CONTINUOUS
Status: ACTIVE | OUTPATIENT
Start: 2024-02-17 | End: 2024-02-17

## 2024-02-17 RX ORDER — BISACODYL 10 MG
10 SUPPOSITORY, RECTAL RECTAL
Status: DISCONTINUED | OUTPATIENT
Start: 2024-02-17 | End: 2024-02-24

## 2024-02-17 RX ORDER — HYDROCODONE BITARTRATE AND ACETAMINOPHEN 5; 325 MG/1; MG/1
2 TABLET ORAL ONCE AS NEEDED
Status: ACTIVE | OUTPATIENT
Start: 2024-02-17 | End: 2024-02-17

## 2024-02-17 RX ORDER — MELATONIN
3 NIGHTLY PRN
Status: DISCONTINUED | OUTPATIENT
Start: 2024-02-17 | End: 2024-02-24

## 2024-02-17 RX ORDER — POLYETHYLENE GLYCOL 3350 17 G/17G
17 POWDER, FOR SOLUTION ORAL DAILY PRN
Status: DISCONTINUED | OUTPATIENT
Start: 2024-02-17 | End: 2024-02-24

## 2024-02-17 RX ORDER — HYDROCODONE BITARTRATE AND ACETAMINOPHEN 5; 325 MG/1; MG/1
1 TABLET ORAL ONCE AS NEEDED
Status: ACTIVE | OUTPATIENT
Start: 2024-02-17 | End: 2024-02-17

## 2024-02-17 RX ADMIN — KETAMINE HYDROCHLORIDE 50 MG: 50 INJECTION, SOLUTION INTRAMUSCULAR; INTRAVENOUS at 17:02:00

## 2024-02-17 RX ADMIN — PHENYLEPHRINE HCL 100 MCG: 10 MG/ML VIAL (ML) INJECTION at 19:48:00

## 2024-02-17 RX ADMIN — METRONIDAZOLE 500 MG: 500 INJECTION, SOLUTION INTRAVENOUS at 16:41:00

## 2024-02-17 RX ADMIN — CALCIUM CHLORIDE 1 G: 100 INJECTION INTRAVENOUS; INTRAVENTRICULAR at 19:21:00

## 2024-02-17 RX ADMIN — ROCURONIUM BROMIDE 50 MG: 10 INJECTION, SOLUTION INTRAVENOUS at 17:01:00

## 2024-02-17 RX ADMIN — PHENYLEPHRINE HCL 150 MCG: 10 MG/ML VIAL (ML) INJECTION at 16:12:00

## 2024-02-17 RX ADMIN — ROCURONIUM BROMIDE 50 MG: 10 INJECTION, SOLUTION INTRAVENOUS at 16:14:00

## 2024-02-17 RX ADMIN — ROCURONIUM BROMIDE 50 MG: 10 INJECTION, SOLUTION INTRAVENOUS at 18:07:00

## 2024-02-17 RX ADMIN — LIDOCAINE HYDROCHLORIDE 100 MG: 10 INJECTION, SOLUTION EPIDURAL; INFILTRATION; INTRACAUDAL; PERINEURAL at 16:02:00

## 2024-02-17 RX ADMIN — SODIUM CHLORIDE, SODIUM LACTATE, POTASSIUM CHLORIDE, CALCIUM CHLORIDE: 600; 310; 30; 20 INJECTION, SOLUTION INTRAVENOUS at 15:56:00

## 2024-02-17 RX ADMIN — PHENYLEPHRINE HCL 150 MCG: 10 MG/ML VIAL (ML) INJECTION at 16:20:00

## 2024-02-17 RX ADMIN — Medication 50 ML: at 19:27:00

## 2024-02-17 RX ADMIN — ALBUMIN, HUMAN INJ 5%: 5 SOLUTION INTRAVENOUS at 19:10:00

## 2024-02-17 RX ADMIN — PHENYLEPHRINE HCL 150 MCG: 10 MG/ML VIAL (ML) INJECTION at 16:23:00

## 2024-02-17 RX ADMIN — SODIUM CHLORIDE, SODIUM LACTATE, POTASSIUM CHLORIDE, CALCIUM CHLORIDE: 600; 310; 30; 20 INJECTION, SOLUTION INTRAVENOUS at 18:39:00

## 2024-02-17 RX ADMIN — ALBUMIN, HUMAN INJ 5%: 5 SOLUTION INTRAVENOUS at 19:45:00

## 2024-02-17 RX ADMIN — SODIUM CHLORIDE, SODIUM LACTATE, POTASSIUM CHLORIDE, CALCIUM CHLORIDE: 600; 310; 30; 20 INJECTION, SOLUTION INTRAVENOUS at 17:02:00

## 2024-02-17 RX ADMIN — PHENYLEPHRINE HCL 200 MCG: 10 MG/ML VIAL (ML) INJECTION at 16:27:00

## 2024-02-17 NOTE — ANESTHESIA PROCEDURE NOTES
Airway  Date/Time: 2/17/2024 4:05 PM  Urgency: elective    Airway not difficult    General Information and Staff    Patient location during procedure: OR  Anesthesiologist: Halie Long MD  Performed: anesthesiologist   Performed by: Halie Long MD  Authorized by: Halie Long MD      Indications and Patient Condition  Indications for airway management: anesthesia  Spontaneous Ventilation: absent  Sedation level: deep  Preoxygenated: yes  Patient position: sniffing  Mask difficulty assessment: 0 - not attempted    Final Airway Details  Final airway type: endotracheal airway      Successful airway: ETT  Cuffed: yes   Successful intubation technique: Video laryngoscopy  Facilitating devices/methods: intubating stylet  Endotracheal tube insertion site: oral  Blade: GlideScope  Blade size: #4  ETT size (mm): 8.0    Cormack-Lehane Classification: grade I - full view of glottis  Placement verified by: capnometry   Cuff volume (mL): 7  Measured from: lips  ETT to lips (cm): 23  Number of attempts at approach: 1
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
## 2024-02-17 NOTE — ANESTHESIA PREPROCEDURE EVALUATION
PRE-OP EVALUATION    Patient Name: Trino Reddy    Admit Diagnosis: Bowel perforation (HCC) [K63.1]  Acute left flank pain [R10.9]    Pre-op Diagnosis: Bowel perforation (HCC) [K63.1]    LAPAROSCOPIC LOW ANTERIOR COLON RESECTION, POSSIBLE EXPLORATORY LAPAROTOMY, POSSIBLE OSTOMY    Anesthesia Procedure: LAPAROSCOPIC LOW ANTERIOR COLON RESECTION, POSSIBLE EXPLORATORY LAPAROTOMY, POSSIBLE OSTOMY    Surgeon(s) and Role:     * Jaylan Hernandez MD - Primary    Pre-op vitals reviewed.  Temp: 98.1 °F (36.7 °C)  Pulse: 119  Resp: 18  BP: 114/66  SpO2: 96 %  Body mass index is 34.67 kg/m².    Current medications reviewed.  Hospital Medications:   [MAR Hold] HYDROmorphone (Dilaudid) 1 MG/ML injection 0.5 mg  0.5 mg Intravenous Q30 Min PRN    [COMPLETED] sodium chloride 0.9 % IV bolus 1,000 mL  1,000 mL Intravenous Once    [MAR Hold] potassium chloride (K-Dur) tab 40 mEq  40 mEq Oral Once    [COMPLETED] iopamidol 76% (ISOVUE-370) injection for power injector  100 mL Intravenous ONCE PRN    [MAR Hold] HYDROmorphone (Dilaudid) 1 MG/ML injection 0.5 mg  0.5 mg Intravenous Q30 Min PRN    piperacillin-tazobactam (Zosyn) 4.5 g in dextrose 5% 100 mL IVPB-ADDV  4.5 g Intravenous Once    sodium chloride 0.9 % IV bolus 1,000 mL  1,000 mL Intravenous Once       Outpatient Medications:     Medications Prior to Admission   Medication Sig Dispense Refill Last Dose    traZODone 50 MG Oral Tab Take 1 tablet (50 mg total) by mouth nightly. 90 tablet 0 2024 at 2100    METFORMIN HCL 1000 MG Oral Tab TAKE 1 TABLET(1000 MG) BY MOUTH DAILY WITH BREAKFAST 90 tablet 0 2024 at 0900    Omeprazole 40 MG Oral Capsule Delayed Release Take 1 capsule (40 mg total) by mouth daily.   2024 at 0900    buPROPion  MG Oral Tablet 24 Hr Take 1 tablet (300 mg total) by mouth daily. 90 tablet 0 More than a month at 0900    [] losartan 25 MG Oral Tab Take 0.5 tablets (12.5 mg total) by mouth daily. 45 tablet 0 More than a month at 0900        Allergies: Patient has no known allergies.      Anesthesia Evaluation    Patient summary reviewed.    Anesthetic Complications  (-) history of anesthetic complications         GI/Hepatic/Renal      (+) GERD                      (+) irritable bowel syndrome     Cardiovascular        Exercise tolerance: good     MET: >4    (+) obesity  (+) hypertension                                     Endo/Other      (+) diabetes  type 2, not using insulin  (-) hypothyroidism  (-) hyperthyroidism                     Pulmonary    Negative pulmonary ROS.                       Neuro/Psych      (+) depression                                History reviewed. No pertinent surgical history.  Social History     Socioeconomic History    Marital status: Single   Tobacco Use    Smoking status: Former     Packs/day: 1     Types: Cigarettes     Quit date:      Years since quittin.1    Smokeless tobacco: Never   Vaping Use    Vaping Use: Every day    Substances: Nicotine    Devices: Pre-filled or refillable cartridge   Substance and Sexual Activity    Alcohol use: Yes     Alcohol/week: 0.0 standard drinks of alcohol     Types: 4 - 15 Cans of beer per week     Comment: \"not very often\" enywhere from 4-15 beers few times a week.    Drug use: No    Sexual activity: Not Currently     History   Drug Use No     Available pre-op labs reviewed.  Lab Results   Component Value Date    WBC 19.6 (H) 2024    RBC 4.52 2024    HGB 13.4 2024    HCT 40.1 2024    MCV 88.7 2024    MCH 29.6 2024    MCHC 33.4 2024    RDW 12.9 2024    .0 2024     Lab Results   Component Value Date     2024    K 3.0 (L) 2024     2024    CO2 25.0 2024    BUN 14 2024    CREATSERUM 0.89 2024     (H) 2024    CA 9.4 2024            Airway      Mallampati: IV  Mouth opening: >3 FB  TM distance: > 6 cm  Neck ROM: full Cardiovascular    Cardiovascular  exam normal.         Dental             Pulmonary    Pulmonary exam normal.                 Other findings              ASA: 2   Plan: general  NPO status verified and Patient does not meet NPO guidelines.  Patient has not taken beta blockers in last 24 hours.  Post-procedure pain management plan discussed with surgeon and patient.  Surgeon requests: regional block  Comment: I spoke with the patient and discussed the risks of general anesthesia, which include allergic reaction, nausea, vomiting, dental injury, sore throat, awareness, hypotension, as well as more serious cardiac and pulmonary complications. The patient understands and consents to receiving general anesthesia for this procedure.      Per surgeon request, I discussed option of TAP nerve blocks with patient. I discussed improved pain control following the procedure however there will likely be need for additional pain medicines. We discussed risks as well, including failed block,, prolonged abdominal numbness. Other very rare complications such as local anesthetic systemic toxicity (LAST), infection, hematoma formation, and permanent nerve damage was also discussed. All questions were answered and patient agreed to proceed.   Plan/risks discussed with: patient  Use of blood product(s) discussed with: patient    Consented to blood products.          Present on Admission:   Hypokalemia   Leukocytosis   Hyperglycemia

## 2024-02-17 NOTE — ED INITIAL ASSESSMENT (HPI)
Pt c/o left sided flank pain that radiates around to the lower abdomen x a few days, pt states \"it feels like my organ could explode at any time.\" Pt states having burning with urination x 1, otherwise denies urinary symptoms.

## 2024-02-17 NOTE — ANESTHESIA PROCEDURE NOTES
Peripheral IV  Date/Time: 2/17/2024 4:10 PM  Inserted by: Halie Long MD    Placement  Needle size: 18 G  Laterality: left  Location: hand  Local anesthetic: none  Site prep: alcohol  Technique: anatomical landmarks  Attempts: 2
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
## 2024-02-17 NOTE — ANESTHESIA PROCEDURE NOTES
Arterial Line    Date/Time: 2/17/2024 4:50 PM    Performed by: Halie Long MD  Authorized by: Halie Long MD    General Information and Staff    Procedure Start:  2/17/2024 4:50 PM  Procedure End:  2/17/2024 4:55 PM  Anesthesiologist:  Halie Lnog MD  Performed By:  Anesthesiologist  Patient Location:  OR  Indication: continuous blood pressure monitoring and blood sampling needed    Site Identification: real time ultrasound guided and surface landmarks    Preanesthetic Checklist: 2 patient identifiers, IV checked, risks and benefits discussed, monitors and equipment checked, pre-op evaluation, timeout performed, anesthesia consent and sterile technique used    Procedure Details    Catheter Size:  20 G  Catheter Length:  1 and 3/4 inch  Catheter Type:  Arrow  Seldinger Technique?: Yes    Laterality:  Left  Site:  Radial artery  Site Prep: chlorhexidine    Line Secured:  Wrist Brace, tape and Tegaderm    Assessment    Events: patient tolerated procedure well with no complications      Medications  2/17/2024 4:50 PM      Additional Comments

## 2024-02-18 PROBLEM — J96.01 ACUTE HYPOXIC RESPIRATORY FAILURE (HCC): Status: ACTIVE | Noted: 2024-02-18

## 2024-02-18 LAB
ALBUMIN SERPL-MCNC: 1.9 G/DL (ref 3.4–5)
ALBUMIN/GLOB SERPL: 0.5 {RATIO} (ref 1–2)
ALP LIVER SERPL-CCNC: 83 U/L
ALT SERPL-CCNC: 13 U/L
ANION GAP SERPL CALC-SCNC: 8 MMOL/L (ref 0–18)
AST SERPL-CCNC: 21 U/L (ref 15–37)
BASOPHILS # BLD AUTO: 0.03 X10(3) UL (ref 0–0.2)
BASOPHILS NFR BLD AUTO: 0.2 %
BILIRUB SERPL-MCNC: 1.2 MG/DL (ref 0.1–2)
BUN BLD-MCNC: 6 MG/DL (ref 9–23)
CALCIUM BLD-MCNC: 7.9 MG/DL (ref 8.5–10.1)
CHLORIDE SERPL-SCNC: 113 MMOL/L (ref 98–112)
CO2 SERPL-SCNC: 24 MMOL/L (ref 21–32)
CREAT BLD-MCNC: 0.62 MG/DL
EGFRCR SERPLBLD CKD-EPI 2021: 125 ML/MIN/1.73M2 (ref 60–?)
EOSINOPHIL # BLD AUTO: 0.02 X10(3) UL (ref 0–0.7)
EOSINOPHIL NFR BLD AUTO: 0.1 %
ERYTHROCYTE [DISTWIDTH] IN BLOOD BY AUTOMATED COUNT: 13.1 %
EST. AVERAGE GLUCOSE BLD GHB EST-MCNC: 240 MG/DL (ref 68–126)
GLOBULIN PLAS-MCNC: 3.8 G/DL (ref 2.8–4.4)
GLUCOSE BLD-MCNC: 155 MG/DL (ref 70–99)
GLUCOSE BLD-MCNC: 156 MG/DL (ref 70–99)
GLUCOSE BLD-MCNC: 166 MG/DL (ref 70–99)
GLUCOSE BLD-MCNC: 181 MG/DL (ref 70–99)
GLUCOSE BLD-MCNC: 199 MG/DL (ref 70–99)
HBA1C MFR BLD: 10 % (ref ?–5.7)
HCT VFR BLD AUTO: 37.5 %
HGB BLD-MCNC: 11.9 G/DL
IMM GRANULOCYTES # BLD AUTO: 0.18 X10(3) UL (ref 0–1)
IMM GRANULOCYTES NFR BLD: 1.2 %
INR BLD: 1.34 (ref 0.8–1.2)
LYMPHOCYTES # BLD AUTO: 1.03 X10(3) UL (ref 1–4)
LYMPHOCYTES NFR BLD AUTO: 6.8 %
MAGNESIUM SERPL-MCNC: 1.7 MG/DL (ref 1.6–2.6)
MCH RBC QN AUTO: 29.4 PG (ref 26–34)
MCHC RBC AUTO-ENTMCNC: 31.7 G/DL (ref 31–37)
MCV RBC AUTO: 92.6 FL
MONOCYTES # BLD AUTO: 0.97 X10(3) UL (ref 0.1–1)
MONOCYTES NFR BLD AUTO: 6.4 %
NEUTROPHILS # BLD AUTO: 13.02 X10 (3) UL (ref 1.5–7.7)
NEUTROPHILS # BLD AUTO: 13.02 X10(3) UL (ref 1.5–7.7)
NEUTROPHILS NFR BLD AUTO: 85.3 %
OSMOLALITY SERPL CALC.SUM OF ELEC: 303 MOSM/KG (ref 275–295)
PHOSPHATE SERPL-MCNC: 4.1 MG/DL (ref 2.5–4.9)
PLATELET # BLD AUTO: 292 10(3)UL (ref 150–450)
POTASSIUM SERPL-SCNC: 3.2 MMOL/L (ref 3.5–5.1)
PROCALCITONIN SERPL-MCNC: 1.77 NG/ML (ref ?–0.16)
PROT SERPL-MCNC: 5.7 G/DL (ref 6.4–8.2)
PROTHROMBIN TIME: 16.6 SECONDS (ref 11.6–14.8)
RBC # BLD AUTO: 4.05 X10(6)UL
SODIUM SERPL-SCNC: 145 MMOL/L (ref 136–145)
WBC # BLD AUTO: 15.3 X10(3) UL (ref 4–11)

## 2024-02-18 PROCEDURE — 99233 SBSQ HOSP IP/OBS HIGH 50: CPT | Performed by: INTERNAL MEDICINE

## 2024-02-18 PROCEDURE — 99232 SBSQ HOSP IP/OBS MODERATE 35: CPT | Performed by: STUDENT IN AN ORGANIZED HEALTH CARE EDUCATION/TRAINING PROGRAM

## 2024-02-18 RX ORDER — MAGNESIUM SULFATE HEPTAHYDRATE 40 MG/ML
2 INJECTION, SOLUTION INTRAVENOUS ONCE
Status: COMPLETED | OUTPATIENT
Start: 2024-02-18 | End: 2024-02-18
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
## 2024-02-18 NOTE — ANESTHESIA PROCEDURE NOTES
Regional Block    Date/Time: 2/17/2024 8:30 PM    Performed by: José Manuel Woods DO  Authorized by: José Manuel Woods DO      General Information and Staff    Start Time:  2/17/2024 8:30 PM  End Time:  2/17/2024 8:35 PM  Anesthesiologist:  José Manuel Woods DO  Performed by:  Anesthesiologist  Patient Location:  OR    Block Placement: Post Induction  Site Identification: real time ultrasound guided and image stored and retrievable    Block site/laterality marked before start: site marked  Reason for Block: at surgeon's request and post-op pain management    Preanesthetic Checklist: 2 patient identifers, IV checked, risks and benefits discussed, monitors and equipment checked, pre-op evaluation, timeout performed, anesthesia consent, sterile technique used, no prohibitive neurological deficits and no local skin infection at insertion site      Procedure Details    Patient Position:  Supine  Prep: ChloraPrep    Monitoring:  Cardiac monitor, continuous pulse ox and blood pressure cuff  Block Type:  TAP  Laterality:  Bilateral  Injection Technique:  Single-shot    Needle    Needle Type:  Short-bevel and echogenic  Needle Length:  110 mm  Needle Localization:  Ultrasound guidance  Reason for Ultrasound Use: appropriate spread of the medication was noted in real time and no ultrasound evidence of intravascular and/or intraneural injection            Assessment    Injection Assessment:  Good spread noted, negative resistance, negative aspiration for heme, incremental injection and low pressure  Heart Rate Change: No    - Patient tolerated block procedure well without evidence of immediate block related complications.     Medications  2/17/2024 8:30 PM      Additional Comments    Medication:  30mL .25% bupivacaine each side (60mL total)

## 2024-02-18 NOTE — ANESTHESIA POSTPROCEDURE EVALUATION
[facility]    Trino Reddy Patient Status:  Inpatient   Age/Gender 39 year old male MRN [id number]   Location [facility] 4SW-A Attending Jaylan Hernandez MD   Hosp Day # 0 PCP Johnnie Mancilla MD       Anesthesia Post-op Note    OPEN LOWER ANTERIOR RESECTION, CREATION OF END COLOSTMY, DRAINAGE OF RETROPERITONEAL ABSCESS    Procedure Summary       Date: 02/17/24 Room / Location:  MAIN OR 09 / [facility] MAIN OR    Anesthesia Start: 1556 Anesthesia Stop: 2055    Procedure: OPEN LOWER ANTERIOR RESECTION, CREATION OF END COLOSTMY, DRAINAGE OF RETROPERITONEAL ABSCESS (Abdomen) Diagnosis:       Bowel perforation (HCC)      (Bowel perforation (HCC) [K63.1])    Surgeons: Jaylan Hernandez MD Anesthesiologist: José Manuel Woods DO    Anesthesia Type: general ASA Status: 2            Anesthesia Type: general    Vitals Value Taken Time   /84 02/17/24 2115   Temp 97.8 °F (36.6 °C) 02/17/24 2100   Pulse 111 02/17/24 2145   Resp 18 02/17/24 2145   SpO2 95 % 02/17/24 2145   Vitals shown include unfiled device data.    Patient Location: ICU    Anesthesia Type: general    Airway Patency: patent and extubated    Postop Pain Control: satisfactory to patient    Mental Status: mildly sedated but able to meaningfully participate in the post-anesthesia evaluation    Nausea/Vomiting: none    Cardiopulmonary/Hydration status: stable euvolemic    Complications: no apparent anesthesia related complications    Postop vital signs: stable    Dental Exam: Unchanged from Preop    Sign out given to ICU staff.

## 2024-02-18 NOTE — PROGRESS NOTES
Critical Care Progress Note     Assessment / Plan:  Shock  - Likely septic due to colitis with perforation  - Blood cultures pending  - Pct 1.77  - MRSA pending  - Lactate normal  - Vasopressors to maintain MAP >65, currently off  - Zosyn (2/17- )    Colitis with perforation POD #1 s/p colon resection with ostomy  - Ostomy care- will consult wound ostomy nurse  - MELIA x1, monitor output  - Pain management  - Encourage IS  - NPO  - General surgery following  - follow up path    Acute hypoxic respiratory failure 2/2 to above  - Intubated 2/17 for procedure, extubated in OR  - wean oxygen as able, currently 4LPM  - chest x-ray with retrocardiac opacity, likely atelectasis    Leukocytosis  - Likely 2/2 to above  - Antibiotics as above  - Afebrile, pct pending  - Monitor    DM2 with hyperglycemia  - Home PO meds on hold  - Accucheck  - SSI    Hypokalemia  - Replete per protocol    Anxiety/depression  - Resume Wellbutrin/trazodone when ok with surgery    F/E/N  - IVF  - Replete electrolytes per protocol  - Diet per surgery    Proph  - SQ lovenox when ok with surgery  - SCDs  - PT/OT    Dispo  - Full code  - ICU monitoring    Judah Dwyer MD  Pulmonary & Critical Care Medicine      Subjective:  Extubated overnight  Pain is a 4 this morning  No dyspnea or chest pain    Objective:  Vitals:    02/18/24 0300 02/18/24 0400 02/18/24 0500 02/18/24 0600   BP: 126/73 135/78 131/72 138/74   BP Location:  Right arm     Pulse: 97 98 103 102   Resp: 15 15 17 17   Temp:  96.6 °F (35.9 °C)     TempSrc:  Temporal     SpO2: 96% 96% 94% 96%   Weight:  281 lb 1.4 oz (127.5 kg)     Height:         Physical Exam:  General: laying in bed  Respiratory: clear bilaterally, normal effort  Cardiovascular: regular rate and rhythm, no m/r/g  Abdomen: midline incision in place, MELIA drain and ostomy, left sided tenderness to palpation  Extremities: no LE edema  Mental status: interactive, answering questions appropriately    Medications:  Reviewed in  EMR    Lab Data:  Reviewed in EMR    Imaging:  I independently visualized all relevant chest imaging in PACS and agree with radiology interpretation except where noted.

## 2024-02-18 NOTE — OPERATIVE REPORT
[facility]  Operative Note    Trino Reddy Location: OR   [facility] [id number] MRN [id number]    1985 Age 39 year old   Admission Date 2024 Operation Date 2024   Attending Physician Jaylan Hernandez MD Operating Physician Jaylan Hernandez MD   PCP Johnnie Mancilla MD          Patient Name: Trino Reddy    Preoperative Diagnosis: Colon perforation (HCC) [K63.1]    Postoperative Diagnosis: Left colon perforation with retroperitoneal abscess    Primary Surgeon: Jaylan Hernandez MD    Assistant: Alma ZAMORA, Kevin Laura MD, Stacey Arnold SA (their assistance was absolutely essential for the safe conduct of this case especially in aiding with exposure and dissection in this patient with a very deep abdominal cavity and thick abdominal wall.)    Anesthesia: General    Procedures: Open low anterior colon resection with creation of end colostomy and drainage of retroperitoneal abscess    Implants: None    Specimen: Sigmoid colon and portion of descending colon    Drains: 19 Thai pelvic Jaden drain    Estimated Blood Loss: 75 cc    Complications: None immediate    Condition: Critical    Indications for Surgery:   Trino Reddy is a 39 year old male who presented to the ER earlier this morning worsening abdominal pain.     The patient states on Thursday, he left work early due to his abdominal pain.  When he returned home from work Thursday afternoon, he looked in the mirror and was flushed. He states he had some nausea and 1 bout of emesis on Thursday evening.  Additionally, the patient states he felt feverish Thursday evening. Fever's were subjective.  He states Thursday evening, he took an Aleve and placed a heating pad over his abdomen.  He slept for approximately 10 hours.  He states his pain did not improve Thursday into Friday. He has been slightly constipated.     Upon presentation to the emergency department, CT scan of the abdomen and pelvis revealed colonic wall thickening along the mid to  distal descending colon to the proximal sigmoid colon.  There are regions of air dissecting through the colonic wall into the left retroperitoneum.  Findings are concerning for an acute colitis with perforation or perforated diverticulitis.  No abscess evident.  The air has dissected into the left psoas muscle. There is associated swelling and edema of the left psoas muscle suggesting myositis.      He is slightly tachycardic with a maximum heart rate of 125.  He is afebrile.  WBCs 19.6 with a left shift.  Hemoglobin 13.4.  Platelets 315.  Hyperglycemic at 250.  Hypokalemic at 3.0.  AST and ALT slightly decreased at 8 and 13 respectively.  Electrolytes, LFTs and bilirubin are otherwise within normal limits.  Lipase is within normal limits at 13.  Lactic acid is within normal limits at 1.5.  Blood cultures pending.     The patient has a past medical history significant for type 2 diabetes mellitus.  He does not take any blood thinning medications.     The patient has no significant past surgical history.     He had a colonoscopy about 5 years ago that was normal.  He has no history of diverticulitis attacks. He has no family history of colon or rectal cancer.     On exam, patient is awake, alert and answering questions appropriately.  He appears mildly uncomfortable especially with any attempted movements.  He has severe left-sided abdominal and flank tenderness.     I recommend proceeding emergently to the operating room for laparoscopic low anterior colon resection, possible open, likely colostomy creation.  The details of this procedure were discussed including the expected recovery time, risks, benefits and alternatives. Specifically, I counseled the patient and his father at bedside that there is a fairly high likelihood of conversion to an open procedure and very high likelihood that I will create a colostomy.  I recommend the colostomy stays in place for at least 3-6 months after the surgery.  Other risk  discussed include but not limited to pain, bleeding, infection, and damage to surrounding organs.  Patients are generally hospitalized for at least 3-5 days after this type of surgery.     Patient and his father expressed understanding and were agreeable to proceed with surgery.  I did shaw patient's abdomen for possible ostomy sites.  Consent to be signed. All questions answered.    Surgical Findings:   Patient became hypotensive with induction of anesthesia and required pressor medication throughout the case.  Thickened and severely inflamed distal descending colon and sigmoid colon with perforation into the retroperitoneum causing a retroperitoneal abscess.  End colostomy appeared pink and patent through the fascia.    Description of Procedure:   Patient was brought to the operating room and positioned supine on the OR table on a pink foam pad. Bilateral sequential compression devices were placed. Preoperative antibiotics were given. Patient was induced under general endotracheal anesthesia.  A Sargent catheter was inserted under sterile technique. Patient was positioned in lithotomy.  The right arm was carefully tucked with all pressure points well-padded. The abdomen was prepped and draped in the usual sterile fashion.  A timeout was performed.     Upon induction of anesthesia, patient became hypotensive.  This hypotension persisted despite fluid boluses and he required pressors.  Therefore, I decided to abort any attempt at laparoscopy and proceed directly with laparotomy.     A large midline skin incision was made just below the xiphoid process and extended down to just above the pubic bone.  The incision was deepened through the midline subcutaneous fat, fascia and peritoneum using electrocautery.  A Bookwalter retractor was positioned to aid with exposure.  The sigmoid colon and distal descending colon was markedly thickened and inflamed.  The stomach, small bowel, and remaining colon and rectum appeared soft  and healthy.     We began with lateral to medial mobilization of the descending colon.  The white line of Toldt was markedly thickened distorting the usual tissue planes.  Patient also had a very deep abdominal cavity and thick abdominal wall making exposure for this dissection more difficult and tedious than usual.  I did enter a retroperitoneal abscess with drainage of purulent fluid.  All the purulent fluid was suctioned out of the abscess cavity.  Eventually, the descending colon and its mesentery was  from the underlying retroperitoneum.    I proceeded with mobilization of the splenic flexure in a lateral to medial fashion using a combination of electrocautery and an Enseal device.  I also mobilized the distal descending colon from its omental attachments using electrocautery entering the lacquer sac.  I was able to rendezvous with the previous distal dissection plane.  Eventually, I was able to completely mobilize the splenic flexure and a lateral to medial fashion.  Once again, patient's body habitus made this dissection more difficult and tedious than usual.    We continued with lateral to medial mobilization of the sigmoid colon using a combination of blunt and sharp dissection with hook electrocautery.  This dissection was also tedious due to the dense inflammation around the sigmoid colon.  Finger fracture dissection was also used.  The left ureter was identified in the sigmoid fossa and was preserved throughout the dissection.  At this point, the descending colon and sigmoid colon appeared well-mobilized.     I identified a distal transection point at the rectosigmoid junction where the rectum appeared soft and healthy.  A window was made within the mesentery beneath the bowel wall at this planned transection point using electrocautery. The colon was divided using a single fire of a contour stapler. The rectal stump staple line was tagged with a 2-0 Prolene suture. The mesentery of the sigmoid  colon specimen was divided with an Enseal device.  The inflamed sigmoid colon and distal descending was rather thickened and bulky.  Therefore, I decided to divide the colon at the mid-descending colon.  A window was made in the mesentery beneath the bowel wall at this planned transection point using electrocautery.  The colon was again divided proximally using a single fire of a contour stapler.  The sigmoid colon and partial descending colon specimen was passed off the field to be sent to pathology.  The Bookwalter retractor was removed.    The abdomen and pelvis was copiously irrigated with warm saline solution until the effluent was clear. The surgical field appeared hemostatic.  A 19 [place] Jaden drain was brought in through a right lateral stab incision and placed in the pelvis. The drain was sutured in place using a 2-0 nylon suture.  An NG tube was placed by the anesthesia team and confirmed to be within the body of the stomach.     A circular skin incision was made in the left upper abdomen above the umbilicus at the lateral edge of the rectus muscle.  This incision was just above the previously marked possible stoma site. This incision was deepened down vertically through the subcutaneous fat and anterior fascia using electrocautery. The rectus muscle was split and the posterior fascia with peritoneum was divided with electrocautery.  The aperture was dilated to ensure it would easily accommodate 2 fingerbreadths. The descending colon was brought out through this colostomy aperture ensuring there was no twisting of the mesentery.     The entire operative team then changed gown and gloves and the operative field was redraped as we prepared for closing.  The lower midline peritoneum was closed using a running #1 Vicryl suture.  The midline fascia was closed using running looped #1 PDS suture x 2.  The skin was reapproximated using interrupted 3-0 Vicryl deep dermal sutures.  The subcutaneous tissue was  irrigated and dried.  The skin was closed using a skin stapler.  The skin incision was covered with an Aquacel dressing.     We then turned our attention to maturation of the colostomy.  The colon was divided where there was clear demarcation of the vascular supply and the colon appeared soft, pink and healthy. 4 corner Chrissie sutures were placed using 3-0 Vicryl.  The remaining gaps between the colon wall and skin were closed using full-thickness bites of 3-0 Vicryl sutures. The colostomy appeared congested at the inferior aspect.  The superior portion of the colostomy appeared pink and healthy. The colostomy was widely patent through the fascia.     The skin was cleaned and dried and a colostomy appliance was placed around the colostomy.  The drain site was dressed with 4 x 4 gauze and paper tape.  The anesthesia team performed a postoperative TAP block.     The patient was awakened from anesthesia, extubated and transported to the intensive care unit in critical condition. All sponge, needle instrument counts were correct at the end of the case.  I was present for the entire case.    Jaylan Hernandez MD  2/17/2024  8:42 PM

## 2024-02-18 NOTE — PROGRESS NOTES
[facility]     Hospitalist Progress Note     Trino Reddy Patient Status:  Inpatient    1985 MRN [id number]   Location [facility] 4SW-A Attending Jaylan Hernandez MD   Hosp Day # 1 PCP Johnnie Mancilla MD     Chief Complaint: Abdominal pain    Subjective:      - s/p low anterior colon resection with ostomy creation and RP abscess drainage on  with surgery   - Transferred to ICU following this d/t hypotension    - on 5L NC, denies significant SOB/chest pain, cough, congestion, f/c    Objective:    Review of Systems:   A comprehensive review of systems was completed; pertinent positive and negatives stated in subjective.    Vital signs:  Temp:  [96.6 °F (35.9 °C)-98.1 °F (36.7 °C)] 96.6 °F (35.9 °C)  Pulse:  [] 105  Resp:  [15-26] 16  BP: (113-140)/(60-92) 134/78  SpO2:  [94 %-98 %] 94 %  AO: (103-154)/(56-80) 103/65    Physical Exam:    General: No acute distress  Respiratory: no wheezes, no rhonchi  Cardiovascular: S1, S2, regular rate and rhythm  Abdomen: Soft, Non-tender, surgical incisions well approximated, MELIA drain in place with serosanginous outpt, ostomy with serous drainage, no flatus/stool.  non-distended, positive bowel sounds  Neuro: No new focal deficits.   Extremities: no edema    Diagnostic Data:    Labs:  Recent Labs   Lab 24  1106 24  21524  0536   WBC 19.6* 16.3* 15.3*   HGB 13.4 11.2* 11.9*   MCV 88.7 91.8 92.6   .0 264.0 292.0   INR  --   --  1.34*       Recent Labs   Lab 24  1107 24  21524  0536   * 241* 199*   BUN 14 8* 6*   CREATSERUM 0.89 0.62* 0.62*   CA 9.4 8.4* 7.9*   ALB 2.6* 2.1* 1.9*    139 145   K 3.0* 3.6 3.2*    110 113*   CO2 25.0 21.0 24.0   ALKPHO 111 94 83   AST 8* 14* 21   ALT 13* 13* 13*   BILT 1.1 1.1 1.2   TP 7.7 6.0* 5.7*       Estimated Creatinine Clearance: 186 mL/min (A) (based on SCr of 0.62 mg/dL (L)).    No results for input(s): \"TROP\", \"TROPHS\", \"CK\" in the last 168  hours.    Recent Labs   Lab 02/18/24  0536   PTP 16.6*   INR 1.34*                  Microbiology    No results found for this visit on 02/17/24.      Imaging: Reviewed in Epic.    Medications:    potassium chloride  40 mEq Intravenous Once    potassium chloride  40 mEq Oral Once    insulin aspart  1-5 Units Subcutaneous Once    enoxaparin  40 mg Subcutaneous Daily    piperacillin-tazobactam  4.5 g Intravenous Q8H    insulin aspart  1-10 Units Subcutaneous q6h       Assessment & Plan:      # Septic shock, resolving   - May also have hypovolemic component given surgery    - IVF, pressors per pulm, wean as tolerated   - IV abx, f/u Bcx    # Colitis with perforation    - IV abx, NPO, IVF, Pain control   - General surgery on consult, taken emergently to OR   - NGT to suction, ADAT per surgery; monitor for bowel recovery   - Should f/u with GI as outpt once healed to r/o IBD as etiology especially given prior episode of illeitis as well.    - F/u Bcx     # Post-op hypoxia   - likely 2/2 atelectasis, will encourage IS and weaning O2 as tolerated    # DM2 - LDSSI + Accucheck QID (or Q6H if NPO)  # Hypokalemia - replete per protocol    Tonny Florentino MD    Supplementary Documentation:     Quality:  DVT Mechanical Prophylaxis:   SCDs, Early ambuation  DVT Pharmacologic Prophylaxis   Medication    enoxaparin (Lovenox) 40 MG/0.4ML SUBQ injection 40 mg                Code Status: Full Code  Sargent: Sargent catheter in place  Sargent Duration (in days): 2  Central line: central venous catheter in place  BRANDY:     The 21st Century Cures Act makes medical notes like these available to patients in the interest of transparency. Please be advised this is a medical document. Medical documents are intended to carry relevant information, facts as evident, and the clinical opinion of the practitioner. The medical note is intended as peer to peer communication and may appear blunt or direct. It is written in medical language and may  contain abbreviations or verbiage that are unfamiliar.

## 2024-02-18 NOTE — PLAN OF CARE
Arrived from OR s/p open colon resection with colostomy and drainage of abscess. Pt was drowsy but oriented and following all commands. Pt was not on any pressors at that time. Tachycardic and afebrile. On 6L Oxymask saturating mid to upper 90's. Pt c/o abdominal pain especially at the midline incision site. Abdomen was tender upon palpation. MELIA drain to bulb suction. Adequate urine output. R nare NGT to LIS.  Vasopressor off throughout the night. Administered pain meds as prescribed. Will continue to closely monitor.      Problem: Diabetes/Glucose Control  Goal: Glucose maintained within prescribed range  Description: INTERVENTIONS:  - Monitor Blood Glucose as ordered  - Assess for signs and symptoms of hyperglycemia and hypoglycemia  - Administer ordered medications to maintain glucose within target range  - Assess barriers to adequate nutritional intake and initiate nutrition consult as needed  - Instruct patient on self management of diabetes  Outcome: Progressing     Problem: PAIN - ADULT  Goal: Verbalizes/displays adequate comfort level or patient's stated pain goal  Description: INTERVENTIONS:  - Encourage pt to monitor pain and request assistance  - Assess pain using appropriate pain scale  - Administer analgesics based on type and severity of pain and evaluate response  - Implement non-pharmacological measures as appropriate and evaluate response  - Consider cultural and social influences on pain and pain management  - Manage/alleviate anxiety  - Utilize distraction and/or relaxation techniques  - Monitor for opioid side effects  - Notify MD/LIP if interventions unsuccessful or patient reports new pain  - Anticipate increased pain with activity and pre-medicate as appropriate  Outcome: Progressing     Problem: CARDIOVASCULAR - ADULT  Goal: Maintains optimal cardiac output and hemodynamic stability  Description: INTERVENTIONS:  - Monitor vital signs, rhythm, and trends  - Monitor for bleeding, hypotension  and signs of decreased cardiac output  - Evaluate effectiveness of vasoactive medications to optimize hemodynamic stability  - Monitor arterial and/or venous puncture sites for bleeding and/or hematoma  - Assess quality of pulses, skin color and temperature  - Assess for signs of decreased coronary artery perfusion - ex. Angina  - Evaluate fluid balance, assess for edema, trend weights  Outcome: Progressing  Goal: Absence of cardiac arrhythmias or at baseline  Description: INTERVENTIONS:  - Continuous cardiac monitoring, monitor vital signs, obtain 12 lead EKG if indicated  - Evaluate effectiveness of antiarrhythmic and heart rate control medications as ordered  - Initiate emergency measures for life threatening arrhythmias  - Monitor electrolytes and administer replacement therapy as ordered  Outcome: Progressing

## 2024-02-18 NOTE — PROGRESS NOTES
[facility]  Progress Note    Trino Reddy Patient Status:  Inpatient    1985 MRN [id number]   Location [facility] [place] 4SW-A Attending Chadd, Tonny Ervin, *   Hosp Day # 1 PCP Johnnie Mancilla MD     Subjective:  The patient was seen and examined at bedside.  No acute events overnight.  The patient is feeling very sore today.  He has an NG tube in place.  He has not passed flatus or had a bowel movement.    Blood pressures have been stable without pressors.  He remains tachycardic.  Maximum heart rate 117.    Afebrile.  WBCs improved to 15.3.  Hemoglobin 11.9.    Objective/Physical Exam:  /89 (BP Location: Right arm)   Pulse 110   Temp 98.5 °F (36.9 °C) (Temporal)   Resp 21   Ht 74\"   Wt 281 lb 1.4 oz (127.5 kg)   SpO2 94%   BMI 36.09 kg/m²     Intake/Output Summary (Last 24 hours) at 2024 1453  Last data filed at 2024 1200  Gross per 24 hour   Intake 9031.6 ml   Output 2660 ml   Net 6371.6 ml         General: Alert, oriented x3. No acute distress.  HEENT: Normocephalic, atraumatic. No scleral icterus.  NG tube in place  Pulmonary: No respiratory distress, effort normal.   Abdomen: Minimally distended, without tympany to percussion.  Appropriate incisional site tenderness to palpation. No rebound or guarding. No peritoneal signs.  Stoma present in left abdomen  Stoma: Pink, and viable.  No output in the ostomy appliance  Incision: Midline incision with Aquacel dressing in place.  Incision appears clean, dry, intact without surrounding erythema or cellulitis  Extremities: No lower extremity edema. No clubbing or cyanosis.   Skin: Warm, dry. No jaundice.       Labs:  Lab Results   Component Value Date    WBC 15.3 2024    HGB 11.9 2024    HCT 37.5 2024    .0 2024      Lab Results   Component Value Date    INR 1.34 (H) 2024     Lab Results   Component Value Date     2024    K 3.2 2024     2024    CO2 24.0 2024     BUN 6 02/18/2024    CREATSERUM 0.62 02/18/2024     02/18/2024    CA 7.9 02/18/2024    ALKPHO 83 02/18/2024    ALT 13 02/18/2024    AST 21 02/18/2024    BILT 1.2 02/18/2024    ALB 1.9 02/18/2024    TP 5.7 02/18/2024          Assessment:  Patient Active Problem List   Diagnosis    IBD (inflammatory bowel disease)    Depression    Anxiety    Class 2 obesity    Type 2 diabetes mellitus without complication, without long-term current use of insulin (HCC)    Tobacco dependence    History of tobacco use disorder    Hypokalemia    Leukocytosis    Hyperglycemia    Colon perforation (HCC)    Acute left flank pain    Bowel perforation (HCC)    Acute hypoxic respiratory failure (HCC)     POD 1 open low anterior resection with creation of end colostomy and drainage of retroperitoneal abscess    Plan:  Patient to remain n.p.o. with NG tube to LIS until he has some return of bowel function  Antiemetics and analgesics as needed  The patient may have 1 cup of water/ice chips every 8 hours  Continue IV antibiotics-Zosyn  Maintain Jaden drain to bulb suction and continue to monitor output  Initiate ostomy education  Encourage ambulation  The patient may transport to the surgical care unit from a general surgical standpoint  Encourage incentive spirometer  DVT prophylaxis with Lovenox  GI prophylaxis with Protonix      The patient was discussed with Dr. Hernandez , and he is in agreement with the assessment and plan. My total face time with this patient was 25 minutes. Greater than half of the visit was spent in counseling the patient on the above listed diagnoses and treatment options.     Alma Busby PA-C  2/18/2024  2:53 PM    This note was initiated by Alma Busby PA-C.  The PA saw the patient in conjunction with me. The PA performed a history, exam, and developed the assessment and plan. I agree with the mentioned assessment and plan and have provided further documentation of my own, if  necessary.    Jaylan Hernandez MD  [facility]

## 2024-02-18 NOTE — ANESTHESIA PROCEDURE NOTES
Central Line    Date/Time: 2/17/2024 8:20 PM    Performed by: José Manuel Woods DO  Authorized by: José Manuel Woods DO    General Information and Staff    Procedure Start:  2/17/2024 8:20 PM  Procedure End:  2/17/2024 8:30 PM  Anesthesiologist:  José Manuel Woods DO  Performed by:  Anesthesiologist  Patient Location:  OR  Indication: central venous access and CVP monitoring    Site Identification: real time ultrasound guided    Preanesthetic Checklist: 2 patient identifiers, IV checked, risks and benefits discussed, monitors and equipment checked, pre-op evaluation, timeout performed, anesthesia consent and sterile technique used    Procedure Detail    Patient Position:  Trendelenburg  Laterality:  Right  Site:  Internal jugular  Prep:  Chloraprep  Catheter Size:  7 Fr  Catheter Length (cm):  20  Number of Lumens:  Triple lumen  Oximetric Catheter?: No    Procedure Detail: target vein identified, needle advanced into vein and blood aspirated and guidewire advanced into vein    Seldinger Technique?: Yes    Intravenous Verification: verified by ultrasound and venous blood return    Post Insertion: all ports aspirated, all ports flushed easily, guidewire was removed intact, line was sutured in place and dressing was applied      Assessment    Events: patient tolerated procedure well with no complications      PA Catheter Placement    PA Catheter Placed?: No      Additional Comments

## 2024-02-19 ENCOUNTER — APPOINTMENT (OUTPATIENT)
Dept: GENERAL RADIOLOGY | Facility: HOSPITAL | Age: 39
End: 2024-02-19
Attending: STUDENT IN AN ORGANIZED HEALTH CARE EDUCATION/TRAINING PROGRAM
Payer: COMMERCIAL

## 2024-02-19 LAB
ANION GAP SERPL CALC-SCNC: 3 MMOL/L (ref 0–18)
BASE EXCESS BLD CALC-SCNC: -6 MMOL/L
BASE EXCESS BLD CALC-SCNC: -8 MMOL/L
BASE EXCESS BLD CALC-SCNC: -8 MMOL/L
BASOPHILS # BLD AUTO: 0.09 X10(3) UL (ref 0–0.2)
BASOPHILS NFR BLD AUTO: 0.6 %
BUN BLD-MCNC: 7 MG/DL (ref 9–23)
CA-I BLD-SCNC: 1.1 MMOL/L (ref 1.12–1.32)
CA-I BLD-SCNC: 1.14 MMOL/L (ref 1.12–1.32)
CA-I BLD-SCNC: 1.21 MMOL/L (ref 1.12–1.32)
CALCIUM BLD-MCNC: 7.9 MG/DL (ref 8.5–10.1)
CHLORIDE SERPL-SCNC: 112 MMOL/L (ref 98–112)
CO2 BLD-SCNC: 20 MMOL/L (ref 22–32)
CO2 BLD-SCNC: 20 MMOL/L (ref 22–32)
CO2 BLD-SCNC: 21 MMOL/L (ref 22–32)
CO2 SERPL-SCNC: 27 MMOL/L (ref 21–32)
CREAT BLD-MCNC: 0.58 MG/DL
EGFRCR SERPLBLD CKD-EPI 2021: 127 ML/MIN/1.73M2 (ref 60–?)
EOSINOPHIL # BLD AUTO: 0.19 X10(3) UL (ref 0–0.7)
EOSINOPHIL NFR BLD AUTO: 1.2 %
ERYTHROCYTE [DISTWIDTH] IN BLOOD BY AUTOMATED COUNT: 13.2 %
GLUCOSE BLD-MCNC: 145 MG/DL (ref 70–99)
GLUCOSE BLD-MCNC: 161 MG/DL (ref 70–99)
GLUCOSE BLD-MCNC: 168 MG/DL (ref 70–99)
GLUCOSE BLD-MCNC: 169 MG/DL (ref 70–99)
GLUCOSE BLD-MCNC: 228 MG/DL (ref 70–99)
GLUCOSE BLD-MCNC: 241 MG/DL (ref 70–99)
GLUCOSE BLD-MCNC: 244 MG/DL (ref 70–99)
HCO3 BLD-SCNC: 18.8 MEQ/L
HCO3 BLD-SCNC: 18.8 MEQ/L
HCO3 BLD-SCNC: 19.8 MEQ/L
HCT VFR BLD AUTO: 36.4 %
HCT VFR BLD CALC: 31 %
HCT VFR BLD CALC: 31 %
HCT VFR BLD CALC: 34 %
HGB BLD-MCNC: 12 G/DL
IMM GRANULOCYTES # BLD AUTO: 0.38 X10(3) UL (ref 0–1)
IMM GRANULOCYTES NFR BLD: 2.3 %
LYMPHOCYTES # BLD AUTO: 1.34 X10(3) UL (ref 1–4)
LYMPHOCYTES NFR BLD AUTO: 8.2 %
MAGNESIUM SERPL-MCNC: 1.9 MG/DL (ref 1.6–2.6)
MCH RBC QN AUTO: 29.6 PG (ref 26–34)
MCHC RBC AUTO-ENTMCNC: 33 G/DL (ref 31–37)
MCV RBC AUTO: 89.9 FL
MONOCYTES # BLD AUTO: 1.19 X10(3) UL (ref 0.1–1)
MONOCYTES NFR BLD AUTO: 7.3 %
NEUTROPHILS # BLD AUTO: 13.16 X10 (3) UL (ref 1.5–7.7)
NEUTROPHILS # BLD AUTO: 13.16 X10(3) UL (ref 1.5–7.7)
NEUTROPHILS NFR BLD AUTO: 80.4 %
OSMOLALITY SERPL CALC.SUM OF ELEC: 295 MOSM/KG (ref 275–295)
PCO2 BLD: 36.6 MMHG
PCO2 BLD: 39.2 MMHG
PCO2 BLD: 41.2 MMHG
PH BLD: 7.27 [PH]
PH BLD: 7.29 [PH]
PH BLD: 7.34 [PH]
PHOSPHATE SERPL-MCNC: 3.7 MG/DL (ref 2.5–4.9)
PLATELET # BLD AUTO: 315 10(3)UL (ref 150–450)
PO2 BLD: 126 MMHG
PO2 BLD: 95 MMHG
PO2 BLD: 97 MMHG
POTASSIUM BLD-SCNC: 2.8 MMOL/L (ref 3.6–5.1)
POTASSIUM BLD-SCNC: 3.6 MMOL/L (ref 3.6–5.1)
POTASSIUM BLD-SCNC: 3.7 MMOL/L (ref 3.6–5.1)
POTASSIUM SERPL-SCNC: 3 MMOL/L (ref 3.5–5.1)
PROCALCITONIN SERPL-MCNC: 1.63 NG/ML (ref ?–0.16)
RBC # BLD AUTO: 4.05 X10(6)UL
SAO2 % BLD: 97 %
SAO2 % BLD: 97 %
SAO2 % BLD: 98 %
SODIUM BLD-SCNC: 139 MMOL/L (ref 136–145)
SODIUM BLD-SCNC: 139 MMOL/L (ref 136–145)
SODIUM BLD-SCNC: 140 MMOL/L (ref 136–145)
SODIUM SERPL-SCNC: 142 MMOL/L (ref 136–145)
WBC # BLD AUTO: 16.4 X10(3) UL (ref 4–11)

## 2024-02-19 PROCEDURE — 71045 X-RAY EXAM CHEST 1 VIEW: CPT | Performed by: STUDENT IN AN ORGANIZED HEALTH CARE EDUCATION/TRAINING PROGRAM

## 2024-02-19 PROCEDURE — 99233 SBSQ HOSP IP/OBS HIGH 50: CPT | Performed by: INTERNAL MEDICINE

## 2024-02-19 PROCEDURE — 99232 SBSQ HOSP IP/OBS MODERATE 35: CPT | Performed by: STUDENT IN AN ORGANIZED HEALTH CARE EDUCATION/TRAINING PROGRAM

## 2024-02-19 RX ORDER — DIPHENHYDRAMINE HYDROCHLORIDE 50 MG/ML
12.5 INJECTION INTRAMUSCULAR; INTRAVENOUS EVERY 4 HOURS PRN
Status: DISCONTINUED | OUTPATIENT
Start: 2024-02-19 | End: 2024-02-21

## 2024-02-19 RX ORDER — SODIUM CHLORIDE 9 MG/ML
INJECTION, SOLUTION INTRAVENOUS CONTINUOUS
Status: DISCONTINUED | OUTPATIENT
Start: 2024-02-19 | End: 2024-02-21

## 2024-02-19 RX ORDER — POTASSIUM CHLORIDE 14.9 MG/ML
20 INJECTION INTRAVENOUS ONCE
Status: COMPLETED | OUTPATIENT
Start: 2024-02-19 | End: 2024-02-20

## 2024-02-19 RX ORDER — POTASSIUM CHLORIDE 14.9 MG/ML
20 INJECTION INTRAVENOUS ONCE
Status: COMPLETED | OUTPATIENT
Start: 2024-02-19 | End: 2024-02-19

## 2024-02-19 RX ORDER — NALOXONE HYDROCHLORIDE 0.4 MG/ML
0.08 INJECTION, SOLUTION INTRAMUSCULAR; INTRAVENOUS; SUBCUTANEOUS
Status: DISCONTINUED | OUTPATIENT
Start: 2024-02-19 | End: 2024-02-21

## 2024-02-19 RX ORDER — ONDANSETRON 2 MG/ML
4 INJECTION INTRAMUSCULAR; INTRAVENOUS EVERY 6 HOURS PRN
Status: DISCONTINUED | OUTPATIENT
Start: 2024-02-19 | End: 2024-02-21

## 2024-02-19 NOTE — PROGRESS NOTES
[facility]    Trino Reddy Patient Status:  Inpatient    1985 MRN [id number]   Location [facility] 4SW-A Attending Tonny Florentino, *   Hosp Day # 2 PCP Johnnie Mancilla MD     Critical Care Progress Note     Date of Admission: 2024 10:57 AM  Admission Diagnosis: Bowel perforation (HCC) [K63.1]  Acute left flank pain [R10.9]     S:  Pt remains off vasopressors.  He complains of pain at surgical sites.        Current Medications:    Current Facility-Administered Medications:     potassium chloride 40 mEq in 250mL sodium chloride 0.9% IVPB premix, 40 mEq, Intravenous, Once **FOLLOWED BY** potassium chloride 20 mEq/100mL IVPB premix 20 mEq, 20 mEq, Intravenous, Once    phenol (Chloraseptic) 1.4 % oral liquid spray, , Oral, Q2H PRN    pantoprazole (Protonix) 40 mg in sodium chloride 0.9% PF 10 mL IV push, 40 mg, Intravenous, Daily    potassium chloride (K-Dur) tab 40 mEq, 40 mEq, Oral, Once    prochlorperazine (Compazine) 10 MG/2ML injection 5 mg, 5 mg, Intravenous, Q8H PRN    insulin aspart (NovoLOG) 100 Units/mL vial 1-5 Units, 1-5 Units, Subcutaneous, Once    glucose (Dex4) 15 GM/59ML oral liquid 15 g, 15 g, Oral, Q15 Min PRN **OR** glucose (Glutose) 40% oral gel 15 g, 15 g, Oral, Q15 Min PRN **OR** glucose-vitamin C (Dex-4) chewable tab 4 tablet, 4 tablet, Oral, Q15 Min PRN **OR** dextrose 50% injection 50 mL, 50 mL, Intravenous, Q15 Min PRN **OR** glucose (Dex4) 15 GM/59ML oral liquid 30 g, 30 g, Oral, Q15 Min PRN **OR** glucose (Glutose) 40% oral gel 30 g, 30 g, Oral, Q15 Min PRN **OR** glucose-vitamin C (Dex-4) chewable tab 8 tablet, 8 tablet, Oral, Q15 Min PRN    traZODone (Desyrel) tab 50 mg, 50 mg, Oral, Nightly PRN    lactated ringers infusion, , Intravenous, Continuous    melatonin tab 3 mg, 3 mg, Oral, Nightly PRN    polyethylene glycol (PEG 3350) (Miralax) 17 g oral packet 17 g, 17 g, Oral, Daily PRN    sennosides (Senokot) tab 17.2 mg, 17.2 mg, Oral, Nightly PRN    bisacodyl  (Dulcolax) 10 MG rectal suppository 10 mg, 10 mg, Rectal, Daily PRN    ondansetron (Zofran) 4 MG/2ML injection 4 mg, 4 mg, Intravenous, Q6H PRN    benzonatate (Tessalon) cap 200 mg, 200 mg, Oral, TID PRN    guaiFENesin (Robitussin) 100 MG/5 ML oral liquid 200 mg, 200 mg, Oral, Q4H PRN    glycerin-hypromellose- (Artifical Tears) 0.2-0.2-1 % ophthalmic solution 1 drop, 1 drop, Both Eyes, QID PRN    sodium chloride (Saline Mist) 0.65 % nasal solution 1 spray, 1 spray, Each Nare, Q3H PRN    enoxaparin (Lovenox) 40 MG/0.4ML SUBQ injection 40 mg, 40 mg, Subcutaneous, Daily    acetaminophen (Tylenol) tab 650 mg, 650 mg, Oral, Q4H PRN **OR** HYDROcodone-acetaminophen (Norco) 5-325 MG per tab 1 tablet, 1 tablet, Oral, Q4H PRN **OR** HYDROcodone-acetaminophen (Norco) 5-325 MG per tab 2 tablet, 2 tablet, Oral, Q4H PRN    HYDROmorphone (Dilaudid) 1 MG/ML injection 0.2 mg, 0.2 mg, Intravenous, Q2H PRN **OR** HYDROmorphone (Dilaudid) 1 MG/ML injection 0.4 mg, 0.4 mg, Intravenous, Q2H PRN **OR** HYDROmorphone (Dilaudid) 1 MG/ML injection 0.8 mg, 0.8 mg, Intravenous, Q2H PRN    piperacillin-tazobactam (Zosyn) 4.5 g in dextrose 5% 100 mL IVPB-ADDV, 4.5 g, Intravenous, Q8H    insulin aspart (NovoLOG) 100 Units/mL FlexPen 1-10 Units, 1-10 Units, Subcutaneous, q6h    acetaminophen (Ofirmev) 10 mg/mL infusion premix 1,000 mg, 1,000 mg, Intravenous, Q6H PRN     OBJECTIVE:  /72   Pulse 107   Temp 97.2 °F (36.2 °C) (Temporal)   Resp 20   Ht 6' 2\" (1.88 m)   Wt 280 lb 3.3 oz (127.1 kg)   SpO2 92%   BMI 35.98 kg/m²      Ventilator Settings: 4L      Wt Readings from Last 3 Encounters:   02/19/24 280 lb 3.3 oz (127.1 kg)   09/27/23 271 lb 6.4 oz (123.1 kg)   06/01/23 263 lb (119.3 kg)        I/O last 3 completed shifts:  In: 64757.6 [P.O.:1920; I.V.:6587.6; IV PIGGYBACK:2000]  Out: 4980 [Urine:2145; Emesis/NG output:2250; Drains:470; Stool:40; Blood:75]  No intake/output data recorded.      Physical Exam:                           General: alert, cooperative, in NAD                          HEENT: oropharynx clear without erythema or exudates, moist mucous membranes                          Lungs: diminished BS                          Chest wall: No tenderness or deformity.                          Heart: Regular rate and rhythm, normal S1S2                          Abdomen: soft, tender, non-distended, hypoactive BS.  Ostomy in place with scant serosanguinous output, MELIA drain in place                          Extremity: No clubbing or cyanosis. no edema                          Skin: No rashes or lesions.       Lab Results   Component Value Date    WBC 16.4 02/19/2024    RBC 4.05 02/19/2024    HGB 12.0 02/19/2024    HCT 36.4 02/19/2024    MCV 89.9 02/19/2024    MCH 29.6 02/19/2024    MCHC 33.0 02/19/2024    RDW 13.2 02/19/2024    .0 02/19/2024     Lab Results   Component Value Date     02/19/2024    K 3.0 02/19/2024    K 3.0 02/19/2024     02/19/2024    CO2 27.0 02/19/2024    BUN 7 02/19/2024    CREATSERUM 0.58 02/19/2024     02/19/2024    CA 7.9 02/19/2024     Lab Results   Component Value Date    INR 1.34 (H) 02/18/2024           Imaging: I have independently visualized all relevant chest imaging in PACS.  I agree with the radiology interpretation except where noted.       ASSESSMENT/PLAN:  Acute hypoxic respiratory failure: expected in the post operative period, now with suspected atelectasis +/- PNA  - Intubated 2/17 for procedure, extubated in OR  - wean oxygen as able, currently 4LPM  - chest x-ray with LLL and RUL opacity concerning for PNA, on zosyn     Septic shock: 2/2 colitis with perforation, now resolved and off pressors  - Pct 1.77  - Vasopressors to maintain MAP >65, currently off  - Zosyn (2/17- )  - cultures NGTD    Colitis with perforation s/p colon resection with ostomy 2/17  - MELIA x1, monitor output  - Pain management  - General surgery following  - encourage OOB as tolerated    DM2 with  hyperglycemia  - Home PO meds on hold  - Accucheck  - SSI    Anxiety/depression  - Resume Wellbutrin/trazodone when ok with surgery    F/E/N  - IVF  - Replete electrolytes per protocol  - Diet per surgery    Proph  - SQ lovenox when ok with surgery  - SCDs  - PT/OT    Dispo  - Full code  - stable to transfer to the floor, duly pulmonary to follow upon discharge     Gaye Aldana MD  2/19/2024  7:22 AM
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
## 2024-02-19 NOTE — CM/SW NOTE
Met w/ pt to provide HHC choice. Pt sitting up in the chair at the time. Family at bedside. Pt asks \"do I have to decide now?\" Pt provided list for review.     Will follow up prior to DC to secure services with agency of pt's choice.    BRAD StephensN, CMSRN    [id number]

## 2024-02-19 NOTE — PLAN OF CARE
Assumed care of patient at change of shift. Pt alert and oriented. 4L NC. NGT to LIS. Pain control. A line removed, central line removed, rodrigez removed. Transfer orders received, awaiting bed on floor. See MAR and flowsheets for additional details.

## 2024-02-19 NOTE — PHYSICAL THERAPY NOTE
PHYSICAL THERAPY EVALUATION - INPATIENT     Room Number: 454/454-A  Evaluation Date: 2/19/2024  Type of Evaluation: Initial  Physician Order: PT Eval and Treat    Presenting Problem: colon perforation  Co-Morbidities : DM  Reason for Therapy: Mobility Dysfunction and Discharge Planning    PHYSICAL THERAPY ASSESSMENT   Patient is currently functioning near baseline with bed mobility, transfers, gait, and stair negotiation.  Prior to admission, patient's baseline is independent.  Patient is requiring supervision as a result of the following impairments: pain.  Physical Therapy will continue to follow for duration of hospitalization.    Patient will benefit from continued skilled PT Services For duration of hospitalization, however, given the patient is functioning near baseline level do not anticipate skilled therapy needs at discharge .    PLAN  PT Treatment Plan: Bed mobility;Body mechanics;Endurance;Energy conservation;Patient education;Family education;Gait training;Range of motion;Strengthening;Stoop training;Stair training;Transfer training;Balance training  Rehab Potential : Good  Frequency (Obs):  (2-3x/week)  Number of Visits to Meet Established Goals: 2      CURRENT GOALS    Goal #1 Patient is able to demonstrate supine - sit EOB @ level: supervision     Goal #2 Patient is able to demonstrate transfers Sit to/from Stand at assistance level:mod ind     Goal #3 Patient is able to ambulate 200 feet with assist device: none at assistance level: supervision     Goal #4 Patient will negotiate 3 stairs with supervision to ensure safety at TX.     Goal #5    Goal #6    Goal Comments: Goals established on 2/19/2024      PHYSICAL THERAPY MEDICAL/SOCIAL HISTORY  History related to current admission: Patient is a 39 year old male admitted on 2/17/2024 from home for abdominal pain.  Pt diagnosed with bowel perforation.  Pt underwent open low anterior resection with creation of end colostomy and drainage of  retroperitoneal abscess on .      HOME SITUATION  Type of Home: House   Home Layout: One level  Stairs to Enter : 3  Railing: Yes          Lives With: Alone     Patient Owned Equipment: None       Prior Level of [place]: Pt works as a , is ambulating frequently throughout the day.    SUBJECTIVE  \"I've been waiting for this.\"      OBJECTIVE  Precautions: Abdominal protective strategies  Fall Risk: Standard fall risk    WEIGHT BEARING RESTRICTION                   PAIN ASSESSMENT  Ratin  Location: abdomen  Management Techniques: Activity promotion;Body mechanics;Breathing techniques;Relaxation;Repositioning    COGNITION  Overall Cognitive Status:  WFL - within functional limits    RANGE OF MOTION AND STRENGTH ASSESSMENT  Upper extremity ROM and strength are within functional limits     Lower extremity ROM is within functional limits     Lower extremity strength is within functional limits       BALANCE  Static Sitting: Good  Dynamic Sitting: Good  Static Standing: Fair +  Dynamic Standing: Fair +    ADDITIONAL TESTS                                    ACTIVITY TOLERANCE                         O2 WALK  Oxygen Therapy  SPO2% Ambulation on Oxygen: 92  Ambulation oxygen flow (liters per minute): 4    NEUROLOGICAL FINDINGS                        AM-PAC '6-Clicks' INPATIENT SHORT FORM - BASIC MOBILITY  How much difficulty does the patient currently have...  Patient Difficulty: Turning over in bed (including adjusting bedclothes, sheets and blankets)?: A Little   Patient Difficulty: Sitting down on and standing up from a chair with arms (e.g., wheelchair, bedside commode, etc.): A Little   Patient Difficulty: Moving from lying on back to sitting on the side of the bed?: A Little   How much help from another person does the patient currently need...   Help from Another: Moving to and from a bed to a chair (including a wheelchair)?: A Little   Help from Another: Need to walk in hospital room?: A  Little   Help from Another: Climbing 3-5 steps with a railing?: A Little       AM-PAC Score:  Raw Score: 18   Approx Degree of Impairment: 46.58%   Standardized Score (AM-PAC Scale): 43.63   CMS Modifier (G-Code): CK    FUNCTIONAL ABILITY STATUS  Gait Assessment   Functional Mobility/Gait Assessment  Gait Assistance: Supervision  Distance (ft): 100  Assistive Device: None  Pattern: Shuffle (slowed johana, pillow to brace against stomach)    Skilled Therapy Provided     Bed Mobility:  Rolling: ind  Supine to sit: supervision with cues for log rolling   Sit to supine: NT     Transfer Mobility:  Sit to stand: supervision   Stand to sit: supervision  Gait = Ambulation in hallway.  Repeated standing rest breaks 2/2 pain.    Therapist's Comments: Encouraged logroll for bed mob, to ambulate and sit in chair 3-4x/day.    Exercise/Education Provided:  Bed mobility  Body mechanics  Functional activity tolerated  Gait training  Transfer training    Patient End of Session: Up in chair;Needs met;Call light within reach;RN aware of session/findings;All patient questions and concerns addressed      Patient Evaluation Complexity Level:  History Low - no personal factors and/or co-morbidities   Examination of body systems Moderate - addressing a total of 3 or more elements   Clinical Presentation Low - Stable   Clinical Decision Making Low - Stable       PT Session Time: 20 minutes    Therapeutic Activity: 8 minutes

## 2024-02-19 NOTE — PROGRESS NOTES
[facility]     Hospitalist Progress Note     Trino Reddy Patient Status:  Inpatient    1985 MRN [id number]   Location [facility] 4SW-A Attending Jaylan Hernandez MD   Hosp Day # 2 PCP Johnnie Mancilla MD     Chief Complaint: Abdominal pain    Subjective:      - No gas/stool noted in ostomy, denies new f/c, n/v   - Significant abdominal pain requiring IV dilaudid Q2H    - Denies SOB/wheezing, cough, congestion, cp; though still on 4-6L NC   - Up in chair today     Objective:    Review of Systems:   A comprehensive review of systems was completed; pertinent positive and negatives stated in subjective.    Vital signs:  Temp:  [97.1 °F (36.2 °C)-98.5 °F (36.9 °C)] 97.2 °F (36.2 °C)  Pulse:  [102-117] 107  Resp:  [15-29] 20  BP: (116-165)/() 121/72  SpO2:  [91 %-96 %] 92 %  AO: ()/(58-67) 123/63    Physical Exam:    General: No acute distress  Respiratory: no wheezes, no rhonchi  Cardiovascular: S1, S2, regular rate and rhythm  Abdomen: Soft, diffuse ttp surgical incisions well approximated, MELIA drain in place with serosanginous outpt, ostomy with serous drainage, no flatus/stool.  non-distended, positive bowel sounds  Neuro: No new focal deficits.   Extremities: no edema    Diagnostic Data:    Labs:  Recent Labs   Lab 24  1106 24  0536 24  0426   WBC 19.6* 16.3* 15.3* 16.4*   HGB 13.4 11.2* 11.9* 12.0*   MCV 88.7 91.8 92.6 89.9   .0 264.0 292.0 315.0   INR  --   --  1.34*  --        Recent Labs   Lab 24  1107 24  0536 24  0426   * 241* 199* 161*   BUN 14 8* 6* 7*   CREATSERUM 0.89 0.62* 0.62* 0.58*   CA 9.4 8.4* 7.9* 7.9*   ALB 2.6* 2.1* 1.9*  --     139 145 142   K 3.0* 3.6 3.2* 3.0*  3.0*    110 113* 112   CO2 25.0 21.0 24.0 27.0   ALKPHO 111 94 83  --    AST 8* 14* 21  --    ALT 13* 13* 13*  --    BILT 1.1 1.1 1.2  --    TP 7.7 6.0* 5.7*  --        Estimated Creatinine Clearance: 198.8 mL/min (A)  (based on SCr of 0.58 mg/dL (L)).    No results for input(s): \"TROP\", \"TROPHS\", \"CK\" in the last 168 hours.    Recent Labs   Lab 02/18/24  0536   PTP 16.6*   INR 1.34*                  Microbiology    Hospital Encounter on 02/17/24   1. Blood Culture     Status: None (Preliminary result)    Collection Time: 02/17/24  2:51 PM    Specimen: Blood,peripheral   Result Value Ref Range    Blood Culture Result No Growth 1 Day N/A         Imaging: Reviewed in Epic.    Medications:    potassium chloride  40 mEq Intravenous Once    Followed by    potassium chloride  20 mEq Intravenous Once    pantoprazole  40 mg Intravenous Daily    potassium chloride  40 mEq Oral Once    insulin aspart  1-5 Units Subcutaneous Once    enoxaparin  40 mg Subcutaneous Daily    piperacillin-tazobactam  4.5 g Intravenous Q8H    insulin aspart  1-10 Units Subcutaneous q6h       Assessment & Plan:      # Septic shock, resolved    - May also have hypovolemic component given surgery    - IV abx, f/u Bcx - NGTD    # Colitis with perforation    - IV abx, NPO, IVF, Pain control   - General surgery on consult, taken emergently to OR   - NGT to suction, ADAT per surgery; monitor for bowel recovery   - Should f/u with GI as outpt once healed to r/o IBD as etiology especially given prior episode of illeitis as well.    - F/u Bcx   - PCA for pain control today     # Post-op hypoxia   - likely 2/2 atelectasis, will encourage IS and weaning O2 as tolerated   - Repeat CXR d/t persistent hypoxia, PCT   - Already on abx, would cover aspiration PNA   - IF not improving with IS and ambulation may need CTA chest to r/o PE     # DM2 - LDSSI + Accucheck QID (or Q6H if NPO)  # Hypokalemia - replete per protocol    Tonny Florentino MD    Supplementary Documentation:     Quality:  DVT Mechanical Prophylaxis:   SCDs, Early ambuation  DVT Pharmacologic Prophylaxis   Medication    enoxaparin (Lovenox) 40 MG/0.4ML SUBQ injection 40 mg                Code Status: Full  Code  Sargent: No urinary catheter in place  Sargent Duration (in days): 2  Central line:    BRANDY:     The 21st Century Cures Act makes medical notes like these available to patients in the interest of transparency. Please be advised this is a medical document. Medical documents are intended to carry relevant information, facts as evident, and the clinical opinion of the practitioner. The medical note is intended as peer to peer communication and may appear blunt or direct. It is written in medical language and may contain abbreviations or verbiage that are unfamiliar.

## 2024-02-19 NOTE — OCCUPATIONAL THERAPY NOTE
OCCUPATIONAL THERAPY EVALUATION - INPATIENT     Room Number: 454/454-A  Evaluation Date: 2/19/2024  Type of Evaluation: Initial  Presenting Problem: colon perforation, 2/17 OPEN LOWER ANTERIOR RESECTION, CREATION OF END COLOSTMY, DRAINAGE OF RETROPERITONEAL ABSCESS, septic shock    Physician Order: IP Consult to Occupational Therapy  Reason for Therapy: ADL/IADL Dysfunction and Discharge Planning    OCCUPATIONAL THERAPY ASSESSMENT   Patient is currently functioning below baseline with toileting, upper body dressing, lower body dressing, grooming, bed mobility, dynamic standing balance, and energy conservation strategies. Prior to admission, patient's baseline is independent. Pt works at a [employer] as a manager.  Patient is requiring supervision and minimal assist as a result of the following impairments: decreased endurance and limited knowledge about abdominal protection strategies . Occupational Therapy will continue to follow for duration of hospitalization.    Patient will benefit from continued skilled OT Services at discharge to promote functional independence in home.  Anticipate patient will return home.      History Related to Current Admission: Patient is a 39 year old male admitted on 2/17/2024 with Presenting Problem: colon perforation, 2/17 OPEN LOWER ANTERIOR RESECTION, CREATION OF END COLOSTMY, DRAINAGE OF RETROPERITONEAL ABSCESS, septic shock. Co-Morbidities : DM. Pt was admitted from home on 2/17 with abdominal pain.  Admitted for colon perforation and septic shock. 2/17 s/p Open low anterior colon resection with creation of end colostomy and drainage of retroperitoneal abscess. Post-op hypoxia and hypotension.            Recommendations for nursing staff:   Transfers: supervision  Toileting location: toilet    EVALUATION SESSION:  Patient Start of Session: supine  FUNCTIONAL TRANSFER ASSESSMENT  Sit to Stand: Edge of Bed  Edge of Bed: Supervision    BED MOBILITY  Supine to Sit : Supervision           O2 SATURATIONS  Oxygen Therapy  SPO2% on Oxygen at Rest: 94  At rest oxygen flow (liters per minute): 4  SPO2% Ambulation on Oxygen: 92  Ambulation oxygen flow (liters per minute): 4    COGNITION  Overall Cognitive Status:  WFL - within functional limits    Upper Extremity   ROM: within functional limits   Strength: within functional limits   EDUCATION PROVIDED  Patient: Role of Occupational Therapy; Plan of Care; Functional Transfer Techniques; Surgical Precautions; Posture/Positioning; Proper Body Mechanics  Patient's Response to Education: Returned Demonstration; Verbalized Understanding     Therapist comments: Pt was given pain medication prior to the session.  Educated the pt about abdominal protection principles.  Supine to sit log rolling, supervision.  Sitting /74.   Supervision to stand and to ambulate in hallway. 4 liters, 91% to 94%,  to 132. OT initiated pt education about energy conservation principles.      Patient End of Session: Up in chair;Needs met;Call light within reach;RN aware of session/findings;All patient questions and concerns addressed;SCDs in place    OCCUPATIONAL PROFILE    HOME SITUATION  Type of Home: House  Home Layout: One level  Lives With: Alone    Toilet and Equipment: Standard height toilet          Occupation/Status: works as a manager at [employer]     Drives: Yes       Prior Level of Function: independent with ADL and IADL. Works as a manager in [employer]. Pt enjoys playing [email or web address].    PAIN ASSESSMENT  Ratin  Location: surgical  Management Techniques: Activity promotion    OBJECTIVE  Precautions: Abdominal protective strategies;NG suction;Drain(s)  Fall Risk: Standard fall risk      ASSESSMENTS    AM-PAC ‘6-Clicks’ Inpatient Daily Activity Short Form  -   Putting on and taking off regular lower body clothing?: A Little  -   Bathing (including washing, rinsing, drying)?: A Little  -   Toileting, which includes using toilet, bedpan or urinal? : A Little  -    Putting on and taking off regular upper body clothing?: A Little  -   Taking care of personal grooming such as brushing teeth?: None  -   Eating meals?: None    AM-PAC Score:  Score: 20  Approx Degree of Impairment: 38.32%  Standardized Score (AM-PAC Scale): 42.03    ADDITIONAL TESTS     NEUROLOGICAL FINDINGS      COGNITION ASSESSMENTS       PLAN  OT Treatment Plan: Balance activities;Energy conservation/work simplification techniques;ADL training;Patient/Family education;Patient/Family training;Compensatory technique education  Rehab Potential : Fair  Frequency: 3x/week  Number of Visits to Meet Established Goals: 3    ADL Goals   Patient will perform grooming: with modified independent and while standing at sink  Patient will perform upper body dressing:  with modified independent  Patient will perform lower body dressing:  with modified independent  Patient will perform toileting: with modified independent    Functional Transfer Goals  Patient will transfer to toilet:  with modified independent    Additional Goals  Pt will recall abdominal protection principles.    Patient Evaluation Complexity Level:   Occupational Profile/Medical History LOW - Brief history including review of medical or therapy records    Specific performance deficits impacting engagement in ADL/IADL LOW  1 - 3 performance deficits    Client Assessment/Performance Deficits MODERATE - Comorbidities and min to mod modifications of tasks    Clinical Decision Making LOW - Analysis of occupational profile, problem-focused assessments, limited treatment options    Overall Complexity LOW     OT Session Time: 20minutes  Self-Care Home Management: 8 minutes  Therapeutic Activity: 3 minutes

## 2024-02-19 NOTE — PLAN OF CARE
Received pt AOX4, DENTON independently and on 4L oxygen via nasal cannula.  Pt NPO except for ice chips and 8 oz water every 8 hrs.   Pt requesting frequent cups of ice chips.  Pt c/o uncontrolled abdominal pain. Pt receiving dilaudid every two hours and states not feeling comfortable. Pt also receiving IV tylenol. APN notified.  Pt might benefit from PCA.  Topic to be discuss with MD this morning.  Will continue to monitor and assess.       Problem: Diabetes/Glucose Control  Goal: Glucose maintained within prescribed range  Description: INTERVENTIONS:  - Monitor Blood Glucose as ordered  - Assess for signs and symptoms of hyperglycemia and hypoglycemia  - Administer ordered medications to maintain glucose within target range  - Assess barriers to adequate nutritional intake and initiate nutrition consult as needed  - Instruct patient on self management of diabetes  Outcome: Progressing     Problem: Patient/Family Goals  Goal: Patient/Family Long Term Goal  Description: Patient's Long Term Goal:     Interventions:  -   - See additional Care Plan goals for specific interventions  Outcome: Progressing  Goal: Patient/Family Short Term Goal  Description: Patient's Short Term Goal:     Interventions:   -   - See additional Care Plan goals for specific interventions  Outcome: Progressing     Problem: PAIN - ADULT  Goal: Verbalizes/displays adequate comfort level or patient's stated pain goal  Description: INTERVENTIONS:  - Encourage pt to monitor pain and request assistance  - Assess pain using appropriate pain scale  - Administer analgesics based on type and severity of pain and evaluate response  - Implement non-pharmacological measures as appropriate and evaluate response  - Consider cultural and social influences on pain and pain management  - Manage/alleviate anxiety  - Utilize distraction and/or relaxation techniques  - Monitor for opioid side effects  - Notify MD/LIP if interventions unsuccessful or patient reports  new pain  - Anticipate increased pain with activity and pre-medicate as appropriate  Outcome: Not Progressing     Problem: CARDIOVASCULAR - ADULT  Goal: Maintains optimal cardiac output and hemodynamic stability  Description: INTERVENTIONS:  - Monitor vital signs, rhythm, and trends  - Monitor for bleeding, hypotension and signs of decreased cardiac output  - Evaluate effectiveness of vasoactive medications to optimize hemodynamic stability  - Monitor arterial and/or venous puncture sites for bleeding and/or hematoma  - Assess quality of pulses, skin color and temperature  - Assess for signs of decreased coronary artery perfusion - ex. Angina  - Evaluate fluid balance, assess for edema, trend weights  Outcome: Not Progressing  Goal: Absence of cardiac arrhythmias or at baseline  Description: INTERVENTIONS:  - Continuous cardiac monitoring, monitor vital signs, obtain 12 lead EKG if indicated  - Evaluate effectiveness of antiarrhythmic and heart rate control medications as ordered  - Initiate emergency measures for life threatening arrhythmias  - Monitor electrolytes and administer replacement therapy as ordered  Outcome: Not Progressing
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
## 2024-02-19 NOTE — PROGRESS NOTES
[facility]  Progress Note    Trino Reddy Patient Status:  Inpatient    1985 MRN [id number]   Location [facility] 4SW-A Attending Chadd, Tonny Ervin, *   Hosp Day # 2 PCP Johnnie Mancilla MD     Subjective:  The patient is sitting up in the chair.  He reports feeling warm and sweaty.  He states his abdomen continues to be distended and is tight.  He states his pain is reasonably controlled.  Continues to have high output from his NG tube.  Per the patient's nurse he has been taking in high quantities of ice chips and sips of clear liquids.  He states he ambulated in the hallway this morning and tolerated it.  He has no output from his ostomy.    Heart rate continues to be in the 110s. Leukocytosis slightly increased today to 16.4. No fever or chills overnight.     Objective/Physical Exam:  /72   Pulse 107   Temp 97.2 °F (36.2 °C) (Temporal)   Resp 20   Ht 74\"   Wt 280 lb 3.3 oz (127.1 kg)   SpO2 92%   BMI 35.98 kg/m²     Intake/Output Summary (Last 24 hours) at 2024 1054  Last data filed at 2024 1006  Gross per 24 hour   Intake 5541 ml   Output 3190 ml   Net 2351 ml     Drain intact to right abdomen with 120 mL of serosanguineous output in 24 hours    General: Alert, oriented x3. No acute distress.  NG tube with 2 L of watered down bilious output in 24 hours  HEENT: Normocephalic, atraumatic. No scleral icterus.  Pulmonary: No respiratory distress, effort normal.   Abdomen: distended, without tympany to percussion. Soft, appropriately tender to palpation. No rebound or guarding. No peritoneal signs.  Ostomy is pink and viable, no gas or stool in the appliance  Incision: Aquacel dressing intact  Extremities: No lower extremity edema. No clubbing or cyanosis.   Skin: Warm, dry. No jaundice.       Labs:  Lab Results   Component Value Date    WBC 16.4 2024    HGB 12.0 2024    HCT 36.4 2024    .0 2024      Lab Results   Component Value Date    INR  1.34 (H) 02/18/2024     Lab Results   Component Value Date     02/19/2024    K 3.0 02/19/2024    K 3.0 02/19/2024     02/19/2024    CO2 27.0 02/19/2024    BUN 7 02/19/2024    CREATSERUM 0.58 02/19/2024     02/19/2024    CA 7.9 02/19/2024          Assessment:  POD 2 open low anterior resection with creation of end colostomy and drainage of retroperitoneal abscess    Plan:  Continue NG tube to low intermittent suction until further return of bowel function  N.p.o. with 4 ounces of ice chips every 4 hours  Continue IV antibiotics with Zosyn  Continue drain care  Patient is pending ostomy education  Encourage frequent ambulation and up to chair  Pain management as needed  He is stable to transfer to surgical floor from a general surgery standpoint  Continue to monitor WBC  Okay to remove occlusive dressing  DVT prophylaxis-Lovenox  GI prophylaxis-Protonix      The patient was discussed with Dr. Hernandez , and he is in agreement with the assessment and plan. My total face time with this patient was 22 minutes. Greater than half of the visit was spent in counseling the patient on the above listed diagnoses and treatment options.     Mary Kamara PA-C  2/19/2024  10:54 AM

## 2024-02-20 LAB
ANION GAP SERPL CALC-SCNC: 6 MMOL/L (ref 0–18)
BASOPHILS # BLD: 0 X10(3) UL (ref 0–0.2)
BASOPHILS NFR BLD: 0 %
BUN BLD-MCNC: 7 MG/DL (ref 9–23)
CALCIUM BLD-MCNC: 7.8 MG/DL (ref 8.5–10.1)
CHLORIDE SERPL-SCNC: 112 MMOL/L (ref 98–112)
CO2 SERPL-SCNC: 26 MMOL/L (ref 21–32)
CREAT BLD-MCNC: 0.55 MG/DL
EGFRCR SERPLBLD CKD-EPI 2021: 129 ML/MIN/1.73M2 (ref 60–?)
EOSINOPHIL # BLD: 0.14 X10(3) UL (ref 0–0.7)
EOSINOPHIL NFR BLD: 1 %
ERYTHROCYTE [DISTWIDTH] IN BLOOD BY AUTOMATED COUNT: 13.6 %
GLUCOSE BLD-MCNC: 126 MG/DL (ref 70–99)
GLUCOSE BLD-MCNC: 142 MG/DL (ref 70–99)
GLUCOSE BLD-MCNC: 165 MG/DL (ref 70–99)
GLUCOSE BLD-MCNC: 173 MG/DL (ref 70–99)
GLUCOSE BLD-MCNC: 190 MG/DL (ref 70–99)
HCT VFR BLD AUTO: 37 %
HGB BLD-MCNC: 11.4 G/DL
LYMPHOCYTES NFR BLD: 1.81 X10(3) UL (ref 1–4)
LYMPHOCYTES NFR BLD: 13 %
MAGNESIUM SERPL-MCNC: 2 MG/DL (ref 1.6–2.6)
MCH RBC QN AUTO: 29.2 PG (ref 26–34)
MCHC RBC AUTO-ENTMCNC: 30.8 G/DL (ref 31–37)
MCV RBC AUTO: 94.6 FL
METAMYELOCYTES # BLD: 0.14 X10(3) UL
METAMYELOCYTES NFR BLD: 1 %
MONOCYTES # BLD: 0.42 X10(3) UL (ref 0.1–1)
MONOCYTES NFR BLD: 3 %
MYELOCYTES # BLD: 0.14 X10(3) UL
MYELOCYTES NFR BLD: 1 %
NEUTROPHILS # BLD AUTO: 10.32 X10 (3) UL (ref 1.5–7.7)
NEUTROPHILS NFR BLD: 76 %
NEUTS BAND NFR BLD: 5 %
NEUTS HYPERSEG # BLD: 11.26 X10(3) UL (ref 1.5–7.7)
OSMOLALITY SERPL CALC.SUM OF ELEC: 300 MOSM/KG (ref 275–295)
PHOSPHATE SERPL-MCNC: 3.3 MG/DL (ref 2.5–4.9)
PLATELET # BLD AUTO: 291 10(3)UL (ref 150–450)
PLATELET MORPHOLOGY: NORMAL
POTASSIUM SERPL-SCNC: 3.7 MMOL/L (ref 3.5–5.1)
POTASSIUM SERPL-SCNC: 3.7 MMOL/L (ref 3.5–5.1)
RBC # BLD AUTO: 3.91 X10(6)UL
SODIUM SERPL-SCNC: 144 MMOL/L (ref 136–145)
TOTAL CELLS COUNTED BLD: 100
WBC # BLD AUTO: 13.9 X10(3) UL (ref 4–11)

## 2024-02-20 PROCEDURE — 99232 SBSQ HOSP IP/OBS MODERATE 35: CPT | Performed by: STUDENT IN AN ORGANIZED HEALTH CARE EDUCATION/TRAINING PROGRAM

## 2024-02-20 PROCEDURE — 99233 SBSQ HOSP IP/OBS HIGH 50: CPT | Performed by: INTERNAL MEDICINE

## 2024-02-20 RX ORDER — ACETAMINOPHEN 500 MG
1000 TABLET ORAL EVERY 8 HOURS
Status: DISCONTINUED | OUTPATIENT
Start: 2024-02-20 | End: 2024-02-24

## 2024-02-20 RX ORDER — BUPROPION HYDROCHLORIDE 150 MG/1
300 TABLET, EXTENDED RELEASE ORAL DAILY
Status: DISCONTINUED | OUTPATIENT
Start: 2024-02-21 | End: 2024-02-21

## 2024-02-20 NOTE — PROGRESS NOTES
[facility]    Trino Reddy Patient Status:  Inpatient    1985 MRN [id number]   Location [facility] 4SW-A Attending Tonny Florentino, *   Hosp Day # 3 PCP Johnnie Mancilla MD     Critical Care Progress Note     Date of Admission: 2024 10:57 AM  Admission Diagnosis: Bowel perforation (HCC) [K63.1]  Acute left flank pain [R10.9]     S:  Pt states pain is well controlled.        Current Medications:    Current Facility-Administered Medications:     sodium chloride 0.9% infusion, , Intravenous, Continuous    naloxone (Narcan) 0.4 MG/ML injection 0.08 mg, 0.08 mg, Intravenous, Q5 Min PRN    diphenhydrAMINE (Benadryl) 50 mg/mL  injection 12.5 mg, 12.5 mg, Intravenous, Q4H PRN    ondansetron (Zofran) 4 MG/2ML injection 4 mg, 4 mg, Intravenous, Q6H PRN    HYDROmorphone in sodium chloride 0.9% (Dilaudid) 20 mg/100mL PCA premix, , Intravenous, Continuous    HYDROmorphone 0.4 mg BOLUS FROM PCA, 0.4 mg, Intravenous, Q30 Min PRN    phenol (Chloraseptic) 1.4 % oral liquid spray, , Oral, Q2H PRN    pantoprazole (Protonix) 40 mg in sodium chloride 0.9% PF 10 mL IV push, 40 mg, Intravenous, Daily    potassium chloride (K-Dur) tab 40 mEq, 40 mEq, Oral, Once    prochlorperazine (Compazine) 10 MG/2ML injection 5 mg, 5 mg, Intravenous, Q8H PRN    insulin aspart (NovoLOG) 100 Units/mL vial 1-5 Units, 1-5 Units, Subcutaneous, Once    glucose (Dex4) 15 GM/59ML oral liquid 15 g, 15 g, Oral, Q15 Min PRN **OR** glucose (Glutose) 40% oral gel 15 g, 15 g, Oral, Q15 Min PRN **OR** glucose-vitamin C (Dex-4) chewable tab 4 tablet, 4 tablet, Oral, Q15 Min PRN **OR** dextrose 50% injection 50 mL, 50 mL, Intravenous, Q15 Min PRN **OR** glucose (Dex4) 15 GM/59ML oral liquid 30 g, 30 g, Oral, Q15 Min PRN **OR** glucose (Glutose) 40% oral gel 30 g, 30 g, Oral, Q15 Min PRN **OR** glucose-vitamin C (Dex-4) chewable tab 8 tablet, 8 tablet, Oral, Q15 Min PRN    traZODone (Desyrel) tab 50 mg, 50 mg, Oral, Nightly PRN    lactated  ringers infusion, , Intravenous, Continuous    melatonin tab 3 mg, 3 mg, Oral, Nightly PRN    polyethylene glycol (PEG 3350) (Miralax) 17 g oral packet 17 g, 17 g, Oral, Daily PRN    sennosides (Senokot) tab 17.2 mg, 17.2 mg, Oral, Nightly PRN    bisacodyl (Dulcolax) 10 MG rectal suppository 10 mg, 10 mg, Rectal, Daily PRN    benzonatate (Tessalon) cap 200 mg, 200 mg, Oral, TID PRN    guaiFENesin (Robitussin) 100 MG/5 ML oral liquid 200 mg, 200 mg, Oral, Q4H PRN    glycerin-hypromellose- (Artifical Tears) 0.2-0.2-1 % ophthalmic solution 1 drop, 1 drop, Both Eyes, QID PRN    sodium chloride (Saline Mist) 0.65 % nasal solution 1 spray, 1 spray, Each Nare, Q3H PRN    enoxaparin (Lovenox) 40 MG/0.4ML SUBQ injection 40 mg, 40 mg, Subcutaneous, Daily    acetaminophen (Tylenol) tab 650 mg, 650 mg, Oral, Q4H PRN **OR** HYDROcodone-acetaminophen (Norco) 5-325 MG per tab 1 tablet, 1 tablet, Oral, Q4H PRN **OR** HYDROcodone-acetaminophen (Norco) 5-325 MG per tab 2 tablet, 2 tablet, Oral, Q4H PRN    piperacillin-tazobactam (Zosyn) 4.5 g in dextrose 5% 100 mL IVPB-ADDV, 4.5 g, Intravenous, Q8H    insulin aspart (NovoLOG) 100 Units/mL FlexPen 1-10 Units, 1-10 Units, Subcutaneous, q6h    acetaminophen (Ofirmev) 10 mg/mL infusion premix 1,000 mg, 1,000 mg, Intravenous, Q6H PRN     OBJECTIVE:  /67 (BP Location: Right arm)   Pulse 102   Temp 97.6 °F (36.4 °C) (Temporal)   Resp 17   Ht 6' 2\" (1.88 m)   Wt 280 lb 3.3 oz (127.1 kg)   SpO2 93%   BMI 35.98 kg/m²      Ventilator Settings: 2L      Wt Readings from Last 3 Encounters:   02/19/24 280 lb 3.3 oz (127.1 kg)   09/27/23 271 lb 6.4 oz (123.1 kg)   06/01/23 263 lb (119.3 kg)        I/O last 3 completed shifts:  In: 5892.8 [P.O.:1500; I.V.:3092.8; IV PIGGYBACK:1300]  Out: 4110 [Urine:2000; Emesis/NG output:1950; Drains:120; Stool:40]  I/O this shift:  In: 17 [I.V.:17]  Out: -       Physical Exam:                          General: alert, cooperative, in NAD                           HEENT: oropharynx clear without erythema or exudates, moist mucous membranes                          Lungs: diminished BS                          Chest wall: No tenderness or deformity.                          Heart: Regular rate and rhythm, normal S1S2                          Abdomen: soft, tender, non-distended, hypoactive BS.  Ostomy in place with serosanguinous output, MELIA drain in place                          Extremity: No clubbing or cyanosis. no edema                          Skin: No rashes or lesions.       Lab Results   Component Value Date    WBC 13.9 02/20/2024    RBC 3.91 02/20/2024    HGB 11.4 02/20/2024    HCT 37.0 02/20/2024    MCV 94.6 02/20/2024    MCH 29.2 02/20/2024    MCHC 30.8 02/20/2024    RDW 13.6 02/20/2024    .0 02/20/2024     Lab Results   Component Value Date     02/20/2024    K 3.7 02/20/2024    K 3.7 02/20/2024     02/20/2024    CO2 26.0 02/20/2024    BUN 7 02/20/2024    CREATSERUM 0.55 02/20/2024     02/20/2024    CA 7.8 02/20/2024     Lab Results   Component Value Date    INR 1.34 (H) 02/18/2024           Imaging: I have independently visualized all relevant chest imaging in PACS.  I agree with the radiology interpretation except where noted.     ASSESSMENT/PLAN:  Acute hypoxic respiratory failure: expected in the post operative period, now with suspected atelectasis +/- PNA  - Intubated 2/17 for procedure, extubated in OR  - wean oxygen as able, currently 2LPM  - chest x-ray with LLL and RUL opacity concerning for PNA, on zosyn     Septic shock: 2/2 colitis with perforation, now resolved and off pressors  - Pct 1.77  - Vasopressors to maintain MAP >65, currently off  - Zosyn (2/17- )  - cultures NGTD    Colitis with perforation s/p colon resection with ostomy 2/17  - MELIA x1, monitor output  - Pain management  - General surgery following  - encourage OOB as tolerated    DM2 with hyperglycemia  - Home PO meds on hold  - Accucheck  -  SSI    Anxiety/depression  - Resume Wellbutrin/trazodone when ok with surgery    F/E/N  - IVF  - Replete electrolytes per protocol  - Diet per surgery    Proph  - SQ lovenox when ok with surgery  - SCDs  - PT/OT    Dispo  - Full code  - stable to transfer to the floor, duly pulmonary to follow peripherally upon discharge     Gaye Aldana MD

## 2024-02-20 NOTE — WOUND PROGRESS NOTE
[facility]  Inpatient Ostomy Care Contact Note    Trino Reddy Patient Status:  Inpatient    1985 MRN [id number]   Location [facility] 4SW-A Attending Tonny Florentino, *   Hosp Day # 3 PCP Johnnie Mancilla MD     Attempted to see patient for follow up ostomy care - appliance change. Patient refused to do any education or pouch change today  as planned. Pt states \" there's too much going on today , one thing at a time\".RN made aware.    We will continue to follow this patient at a later time  while in-house and assist with ostomy care care discharge planning.    Please call me at [phone]  if you have any questions about this consultation and plan of care.       Thank you,    Kaykay Arroyo RN BSN [facility]  [address] [address]  [phone]  Spectralink: [phone]  Pager: [phone]  VM: [phone]

## 2024-02-20 NOTE — PROGRESS NOTES
[facility]     Hospitalist Progress Note     Trino Reddy Patient Status:  Inpatient    1985 MRN [id number]   Location [facility] 4SW-A Attending Jaylan Hernandez MD   Hosp Day # 3 PCP Johnnie Mancilla MD     Chief Complaint: Abdominal pain    Subjective:      - Now weaned to 2L NC, satting well    - Pain well controlled with PCA,  ambulating multiple times around halls   - Small amount of gas in ostomy, no stool    - 500cc per nursing out of NGT    Objective:    Review of Systems:   A comprehensive review of systems was completed; pertinent positive and negatives stated in subjective.    Vital signs:  Temp:  [96.7 °F (35.9 °C)-98.2 °F (36.8 °C)] 97.6 °F (36.4 °C)  Pulse:  [100-118] 102  Resp:  [17-27] 17  BP: (128-149)/(67-99) 132/67  SpO2:  [88 %-97 %] 93 %    Physical Exam:    General: No acute distress  Respiratory: no wheezes, no rhonchi  Cardiovascular: S1, S2, regular rate and rhythm  Abdomen: Soft, diffuse ttp surgical incisions well approximated, MELIA drain in place with serosanginous outpt, ostomy with serous drainage, small flatus no stool.  non-distended, positive bowel sounds  Neuro: No new focal deficits.   Extremities: no edema    Diagnostic Data:    Labs:  Recent Labs   Lab 24  1106 24  2159 24  0536 24  0426 24  0429   WBC 19.6* 16.3* 15.3* 16.4* 13.9*   HGB 13.4 11.2* 11.9* 12.0* 11.4*   MCV 88.7 91.8 92.6 89.9 94.6   .0 264.0 292.0 315.0 291.0   BAND  --   --   --   --  5   INR  --   --  1.34*  --   --        Recent Labs   Lab 24  1107 24  2159 24  0536 24  0426 24  1644 24  0429   * 241* 199* 161*  --  173*   BUN 14 8* 6* 7*  --  7*   CREATSERUM 0.89 0.62* 0.62* 0.58*  --  0.55*   CA 9.4 8.4* 7.9* 7.9*  --  7.8*   ALB 2.6* 2.1* 1.9*  --   --   --     139 145 142  --  144   K 3.0* 3.6 3.2* 3.0*  3.0* 3.0* 3.7  3.7    110 113* 112  --  112   CO2 25.0 21.0 24.0 27.0  --  26.0   ALKPHO 111 94  83  --   --   --    AST 8* 14* 21  --   --   --    ALT 13* 13* 13*  --   --   --    BILT 1.1 1.1 1.2  --   --   --    TP 7.7 6.0* 5.7*  --   --   --        Estimated Creatinine Clearance: 209.7 mL/min (A) (based on SCr of 0.55 mg/dL (L)).    No results for input(s): \"TROP\", \"TROPHS\", \"CK\" in the last 168 hours.    Recent Labs   Lab 02/18/24  0536   PTP 16.6*   INR 1.34*                  Microbiology    Hospital Encounter on 02/17/24   1. Blood Culture     Status: None (Preliminary result)    Collection Time: 02/17/24  2:51 PM    Specimen: Blood,peripheral   Result Value Ref Range    Blood Culture Result No Growth 2 Days N/A         Imaging: Reviewed in Epic.    Medications:    pantoprazole  40 mg Intravenous Daily    potassium chloride  40 mEq Oral Once    insulin aspart  1-5 Units Subcutaneous Once    enoxaparin  40 mg Subcutaneous Daily    piperacillin-tazobactam  4.5 g Intravenous Q8H    insulin aspart  1-10 Units Subcutaneous q6h       Assessment & Plan:      # Septic shock, resolved    - May also have hypovolemic component given surgery    - IV abx, f/u Bcx - NGTD    # Colitis with perforation    - IV abx, NPO, IVF, Pain control   - General surgery on consult, taken emergently to OR   - NGT to suction, ADAT per surgery; monitor for bowel recovery   - Should f/u with GI as outpt once healed to r/o IBD as etiology especially given prior episode of illeitis as well.    - F/u Bcx   - PCA for pain control      # Post-op hypoxia, improving    - likely 2/2 atelectasis & PNa, will encourage IS and weaning O2 as tolerated   - Repeat CXR d/t persistent hypoxia showing developing PNA, PCT elevated   - Already on abx, would cover aspiration PNA    # DM2 - LDSSI + Accucheck QID (or Q6H if NPO)  # Hypokalemia - replete per protocol    Tonny Florentino MD    Supplementary Documentation:     Quality:  DVT Mechanical Prophylaxis:   SCDs, Early ambuation  DVT Pharmacologic Prophylaxis   Medication    enoxaparin (Lovenox)  40 MG/0.4ML SUBQ injection 40 mg                Code Status: Full Code  Sargent: No urinary catheter in place  Sargent Duration (in days): 2  Central line:    BRANDY:     The 21st Century Cures Act makes medical notes like these available to patients in the interest of transparency. Please be advised this is a medical document. Medical documents are intended to carry relevant information, facts as evident, and the clinical opinion of the practitioner. The medical note is intended as peer to peer communication and may appear blunt or direct. It is written in medical language and may contain abbreviations or verbiage that are unfamiliar.

## 2024-02-20 NOTE — PROGRESS NOTES
[facility]  Progress Note    Trino Reddy Patient Status:  Inpatient    1985 MRN [id number]   Location [facility] 4SW-A Attending Chadd, Tonny Ervin, *   Hosp Day # 3 PCP Johnnie Mancilla MD     Subjective:  The patient is resting comfortably in bed.  He denies nausea or vomiting.  NG tube is in place.  He is voiding without difficulty.    Objective/Physical Exam:  General: Alert, orientated x3.  Cooperative.  No apparent distress.  Vital Signs:  Blood pressure 133/76, pulse 100, temperature 99 °F (37.2 °C), temperature source Temporal, resp. rate 17, height 74\", weight 280 lb 3.3 oz (127.1 kg), SpO2 91%.  Lungs: No respiratory distress.  Cardiac: Regular rate and rhythm.   Abdomen:  Soft, mildly distended, incisional tender, with no rebound or guarding.  No peritoneal signs.  Ostomy pink with serosanguineous output noted  Extremities:  No lower extremity edema noted.    Incision: Clean, dry, intact, no erythema  Drain: With serosanguineous output noted    Labs:  Lab Results   Component Value Date    WBC 13.9 2024    HGB 11.4 2024    HCT 37.0 2024    .0 2024     Lab Results   Component Value Date     2024    K 3.7 2024    K 3.7 2024     2024    CO2 26.0 2024    BUN 7 2024    CREATSERUM 0.55 2024     2024    CA 7.8 2024     Lab Results   Component Value Date    INR 1.34 (H) 2024       I/O last 3 completed shifts:  In: 5892.8 [P.O.:1500; I.V.:3092.8; IV PIGGYBACK:1300]  Out: 4110 [Urine:2000; Emesis/NG output:1950; Drains:120; Stool:40]  I/O this shift:  In: 17 [I.V.:17]  Out: 100 [Urine:100]    Assessment  Patient Active Problem List   Diagnosis    IBD (inflammatory bowel disease)    Depression    Anxiety    Class 2 obesity    Type 2 diabetes mellitus without complication, without long-term current use of insulin (HCC)    Tobacco dependence    History of tobacco use disorder     Hypokalemia    Leukocytosis    Hyperglycemia    Colon perforation (HCC)    Acute left flank pain    Bowel perforation (HCC)    Acute hypoxic respiratory failure (HCC)       Postop day 3 open low anterior resection with creation of end colostomy and drainage of retroperitoneal abscess  Acute hypoxic respiratory failure, possible pneumonia    Plan:  Clamp NG tube for 4 hours and check residuals.  If residuals are less than 200, may remove NG tube and start clear liquid diet.  NPO.  He may have ice chips.  Continue ostomy care.  Continue IV antibiotics with Zosyn.  Continue pulmonary toilet  Ambulate and up to chair  DVT prophylaxis with Lovenox  GI prophylaxis with Protonix      Mirna San PA-C  2/20/2024  10:45 AM    This note was initiated by Mirna San PA-C.  The PA saw the patient in conjunction with me. The PA performed a history, exam, and developed the assessment and plan. I agree with the mentioned assessment and plan and have provided further documentation of my own, if necessary.    Jaylan Hernandez MD  [facility]

## 2024-02-20 NOTE — PROGRESS NOTES
Patient received alert and oriented this shift, able to make needs known. IS encouraged, patient able to use it appropriately throughout the night.      Air noted in colostomy bag. Serosanguinous drainage noted from zcah drain- please refer to flow sheet.

## 2024-02-20 NOTE — PLAN OF CARE
Assumed care of pt after RN report. Alert and oriented. Pain controlled with PCA. VSS. Decreasing O2 needs, now using IS at bedside, previously was not pt states due to lack of education. Ambulated in halls x4. NG remains in place. Pt taking in ice chips every 2 hours per order. See flowsheets for full assessments. POC discussed with pt.

## 2024-02-21 ENCOUNTER — APPOINTMENT (OUTPATIENT)
Dept: GENERAL RADIOLOGY | Facility: HOSPITAL | Age: 39
End: 2024-02-21
Attending: INTERNAL MEDICINE
Payer: COMMERCIAL

## 2024-02-21 LAB
ANION GAP SERPL CALC-SCNC: 6 MMOL/L (ref 0–18)
BASOPHILS # BLD: 0 X10(3) UL (ref 0–0.2)
BASOPHILS NFR BLD: 0 %
BUN BLD-MCNC: 6 MG/DL (ref 9–23)
CALCIUM BLD-MCNC: 8 MG/DL (ref 8.5–10.1)
CHLORIDE SERPL-SCNC: 103 MMOL/L (ref 98–112)
CO2 SERPL-SCNC: 31 MMOL/L (ref 21–32)
CREAT BLD-MCNC: 0.66 MG/DL
EGFRCR SERPLBLD CKD-EPI 2021: 122 ML/MIN/1.73M2 (ref 60–?)
EOSINOPHIL # BLD: 0.25 X10(3) UL (ref 0–0.7)
EOSINOPHIL NFR BLD: 2 %
ERYTHROCYTE [DISTWIDTH] IN BLOOD BY AUTOMATED COUNT: 13.4 %
GLUCOSE BLD-MCNC: 166 MG/DL (ref 70–99)
GLUCOSE BLD-MCNC: 174 MG/DL (ref 70–99)
GLUCOSE BLD-MCNC: 176 MG/DL (ref 70–99)
GLUCOSE BLD-MCNC: 181 MG/DL (ref 70–99)
GLUCOSE BLD-MCNC: 218 MG/DL (ref 70–99)
GLUCOSE BLD-MCNC: 249 MG/DL (ref 70–99)
HCT VFR BLD AUTO: 32.6 %
HGB BLD-MCNC: 10.5 G/DL
LYMPHOCYTES NFR BLD: 1.48 X10(3) UL (ref 1–4)
LYMPHOCYTES NFR BLD: 12 %
MAGNESIUM SERPL-MCNC: 2.1 MG/DL (ref 1.6–2.6)
MCH RBC QN AUTO: 29 PG (ref 26–34)
MCHC RBC AUTO-ENTMCNC: 32.2 G/DL (ref 31–37)
MCV RBC AUTO: 90.1 FL
MONOCYTES # BLD: 0.74 X10(3) UL (ref 0.1–1)
MONOCYTES NFR BLD: 6 %
MORPHOLOGY: NORMAL
MYELOCYTES # BLD: 0.62 X10(3) UL
MYELOCYTES NFR BLD: 5 %
NEUTROPHILS # BLD AUTO: 8.78 X10 (3) UL (ref 1.5–7.7)
NEUTROPHILS NFR BLD: 74 %
NEUTS BAND NFR BLD: 1 %
NEUTS HYPERSEG # BLD: 9.23 X10(3) UL (ref 1.5–7.7)
OSMOLALITY SERPL CALC.SUM OF ELEC: 292 MOSM/KG (ref 275–295)
PLATELET # BLD AUTO: 205 10(3)UL (ref 150–450)
PLATELET MORPHOLOGY: NORMAL
POTASSIUM SERPL-SCNC: 2.9 MMOL/L (ref 3.5–5.1)
POTASSIUM SERPL-SCNC: 3.3 MMOL/L (ref 3.5–5.1)
RBC # BLD AUTO: 3.62 X10(6)UL
SODIUM SERPL-SCNC: 140 MMOL/L (ref 136–145)
TOTAL CELLS COUNTED BLD: 100
WBC # BLD AUTO: 12.3 X10(3) UL (ref 4–11)

## 2024-02-21 PROCEDURE — 71045 X-RAY EXAM CHEST 1 VIEW: CPT | Performed by: INTERNAL MEDICINE

## 2024-02-21 PROCEDURE — 99232 SBSQ HOSP IP/OBS MODERATE 35: CPT | Performed by: INTERNAL MEDICINE

## 2024-02-21 PROCEDURE — 99233 SBSQ HOSP IP/OBS HIGH 50: CPT | Performed by: INTERNAL MEDICINE

## 2024-02-21 PROCEDURE — 05HB33Z INSERTION OF INFUSION DEVICE INTO RIGHT BASILIC VEIN, PERCUTANEOUS APPROACH: ICD-10-PCS | Performed by: INTERNAL MEDICINE

## 2024-02-21 PROCEDURE — B54MZZA ULTRASONOGRAPHY OF RIGHT UPPER EXTREMITY VEINS, GUIDANCE: ICD-10-PCS | Performed by: INTERNAL MEDICINE

## 2024-02-21 RX ORDER — POTASSIUM CHLORIDE 1.5 G/1.58G
40 POWDER, FOR SOLUTION ORAL ONCE
Status: DISCONTINUED | OUTPATIENT
Start: 2024-02-21 | End: 2024-02-21 | Stop reason: CLARIF

## 2024-02-21 RX ORDER — POTASSIUM CHLORIDE 14.9 MG/ML
20 INJECTION INTRAVENOUS ONCE
Status: COMPLETED | OUTPATIENT
Start: 2024-02-21 | End: 2024-02-21

## 2024-02-21 RX ORDER — POTASSIUM CHLORIDE 20 MEQ/1
40 TABLET, EXTENDED RELEASE ORAL ONCE
Status: COMPLETED | OUTPATIENT
Start: 2024-02-21 | End: 2024-02-21

## 2024-02-21 RX ORDER — RUFINAMIDE 40 MG/ML
1 SUSPENSION ORAL DAILY
Status: DISCONTINUED | OUTPATIENT
Start: 2024-02-22 | End: 2024-02-24

## 2024-02-21 RX ORDER — HYDROMORPHONE HYDROCHLORIDE 1 MG/ML
0.1 INJECTION, SOLUTION INTRAMUSCULAR; INTRAVENOUS; SUBCUTANEOUS EVERY 2 HOUR PRN
Status: DISCONTINUED | OUTPATIENT
Start: 2024-02-21 | End: 2024-02-24

## 2024-02-21 RX ORDER — OXYCODONE HYDROCHLORIDE 10 MG/1
5 TABLET ORAL EVERY 4 HOURS PRN
Status: DISCONTINUED | OUTPATIENT
Start: 2024-02-21 | End: 2024-02-24

## 2024-02-21 RX ORDER — PANTOPRAZOLE SODIUM 40 MG/1
40 TABLET, DELAYED RELEASE ORAL
Status: DISCONTINUED | OUTPATIENT
Start: 2024-02-22 | End: 2024-02-24

## 2024-02-21 NOTE — DIETARY NOTE
[facility]    NUTRITION ASSESSMENT    Pt does not meet malnutrition criteria at this time.        NUTRITION INTERVENTION:    Meal and Snacks - Monitor and encourage adequate PO intake.   Medical Food Supplements - Glucerna Daily. Rationale/use for oral supplements discussed.  Nutrition Education - colostomy education provides. Pt/family receptive to education, no barriers noted. Handouts provided.   Vitamin and Mineral Supplements - adding Multivitamin with minerals  Coordination of Nutrition Care - GI/Surgery consult for nutrition support/diet advancement       PATIENT STATUS: 02/21/24 39 year old male admitted w/ bowel perf. PMH includes DM2. Pt seen for high A1C. Status post colostomy 2/17. RP abscess drainage on 2/17 with surgery. NGT to LIS on 2/18. NGT is now removed. Pt on full liquid diet. Pt reports fair appetite. Reports eating yogurt and apple juice for breakfast. Ordered ONS to maximize nutrition. Briefly discussed high A1C. Pt not interested in education at this time. No n/v/d. No issues chewing or swallowing. Discusses low fiber diet, chewing thoroughly, and small frequent meals. Pt receptive to education. Handouts provided. Pt is w/o nutritional questions or concerns at this time. Will continue to monitor diet advancement.        ANTHROPOMETRICS:  Ht: 188 cm (6' 2\")  Wt: 131.2 kg (289 lb 3.9 oz).   BMI: Body mass index is 37.14 kg/m².  IBW: 86.4 kg      WEIGHT HISTORY:   Weight loss: No    Wt Readings from Last 10 Encounters:   02/21/24 131.2 kg (289 lb 3.9 oz)   09/27/23 123.1 kg (271 lb 6.4 oz)   06/01/23 119.3 kg (263 lb)   04/19/22 119.7 kg (263 lb 12.8 oz)   03/29/22 118.8 kg (262 lb)   12/27/18 122.5 kg (270 lb)   09/10/14 131.5 kg (290 lb)   09/24/08 127 kg (280 lb)   08/20/08 127 kg (280 lb)   08/01/08 127 kg (280 lb)        NUTRITION:  Diet:       Procedures    Full liquid diet Is Patient on Accuchecks? Yes      Food Allergies: No  Cultural/Ethnic/Cheondoism Preferences Addressed:  Yes    Percent Meals Eaten (last 3 days)       Date/Time Percent Meals Eaten (%)    02/20/24 1900 80 %            GI system review: abdominal pain Last BM: prior to admit  Skin and wounds: abdominal surgical wound    NUTRITION RELATED PHYSICAL FINDINGS:     1. Body Fat/Muscle Mass:  no wasting noted per visual     2. Fluid Accumulation: none per RN documentation     NUTRITION PRESCRIPTION: IBW: 86.4 kg  Calories: 2347-2010 calories/day (25-30 kcal/kg)  Protein: 130-173 grams protein/day (1.5-2.0 grams protein per kg)  Fluid: ~1 ml/kcal or per MD discretion    NUTRITION DIAGNOSIS/PROBLEM:  Inadequate energy intake related to altered GI function/GI disorder as evidenced by documented/reported insufficient oral intake      MONITOR AND EVALUATE/NUTRITION GOALS:  PO intake of 75% of meals TID - New  PO intake of 75% of oral nutrition supplement/s - New  Weight stable within 1 to 2 lbs during admission - New      MEDICATIONS:  Novolong, Protonix, Abx  Kcl 60mEq      LABS:  Glucose 174  Potassium: 2.9  A1C: 10%  -190    Pt is at Moderate nutrition risk    Willa Garcia  Dietetic intern

## 2024-02-21 NOTE — PLAN OF CARE
Assumed care of patient at 0700..  Denies chest pain, sob, lightheadedness,   PCA pump DC and oxy PRN started.   Pt has ambulated with walker around unit x2 : 1 with staff and 1 with PT.   Diet advanced to diabetic soft. But orders to GO SLOW per surgery >     Ostomy teaching completed from ostomy RN.    Transfer to SCU, report called.       Problem: Diabetes/Glucose Control  Goal: Glucose maintained within prescribed range  Description: INTERVENTIONS:  - Monitor Blood Glucose as ordered  - Assess for signs and symptoms of hyperglycemia and hypoglycemia  - Administer ordered medications to maintain glucose within target range  - Assess barriers to adequate nutritional intake and initiate nutrition consult as needed  - Instruct patient on self management of diabetes  Outcome: Progressing     Problem: Patient/Family Goals  Goal: Patient/Family Long Term Goal  Description: Patient's Long Term Goal:     Interventions:  -   - See additional Care Plan goals for specific interventions  Outcome: Progressing  Goal: Patient/Family Short Term Goal  Description: Patient's Short Term Goal:     Interventions:   -   - See additional Care Plan goals for specific interventions  Outcome: Progressing     Problem: PAIN - ADULT  Goal: Verbalizes/displays adequate comfort level or patient's stated pain goal  Description: INTERVENTIONS:  - Encourage pt to monitor pain and request assistance  - Assess pain using appropriate pain scale  - Administer analgesics based on type and severity of pain and evaluate response  - Implement non-pharmacological measures as appropriate and evaluate response  - Consider cultural and social influences on pain and pain management  - Manage/alleviate anxiety  - Utilize distraction and/or relaxation techniques  - Monitor for opioid side effects  - Notify MD/LIP if interventions unsuccessful or patient reports new pain  - Anticipate increased pain with activity and pre-medicate as appropriate  Outcome:  Progressing     Problem: CARDIOVASCULAR - ADULT  Goal: Maintains optimal cardiac output and hemodynamic stability  Description: INTERVENTIONS:  - Monitor vital signs, rhythm, and trends  - Monitor for bleeding, hypotension and signs of decreased cardiac output  - Evaluate effectiveness of vasoactive medications to optimize hemodynamic stability  - Monitor arterial and/or venous puncture sites for bleeding and/or hematoma  - Assess quality of pulses, skin color and temperature  - Assess for signs of decreased coronary artery perfusion - ex. Angina  - Evaluate fluid balance, assess for edema, trend weights  Outcome: Progressing  Goal: Absence of cardiac arrhythmias or at baseline  Description: INTERVENTIONS:  - Continuous cardiac monitoring, monitor vital signs, obtain 12 lead EKG if indicated  - Evaluate effectiveness of antiarrhythmic and heart rate control medications as ordered  - Initiate emergency measures for life threatening arrhythmias  - Monitor electrolytes and administer replacement therapy as ordered  Outcome: Progressing

## 2024-02-21 NOTE — PROGRESS NOTES
[facility]  Progress Note    Trino Reddy Patient Status:  Inpatient    1985 MRN [id number]   Location [facility] 4SW-A Attending Ramy Morfin,    Hosp Day # 4 PCP Johnnie Mancilla MD     Subjective:  Patient is doing well today.  He denies nausea or vomiting.  He is tolerating clear liquids.  He reports air and stool out of his ostomy.    Objective/Physical Exam:  General: Alert, orientated x3.  Cooperative.  No apparent distress.  Vital Signs:  Blood pressure 141/86, pulse 87, temperature 97.9 °F (36.6 °C), temperature source Temporal, resp. rate 18, height 74\", weight 289 lb 3.9 oz (131.2 kg), SpO2 92%.  Tmax 101.2  Lungs: No respiratory distress.  Cardiac: Regular rate and rhythm.   Abdomen:  Soft, non distended, non tender, with no rebound or guarding.  No peritoneal signs.  Ostomy pink and functioning well with gas and stool noted in ostomy bag  Extremities:  No lower extremity edema noted.    Incision: Clean, dry, intact, no erythema  Drain: With serosanguineous output.    Labs:  Lab Results   Component Value Date    WBC 12.3 2024    HGB 10.5 2024    HCT 32.6 2024    .0 2024     Lab Results   Component Value Date     2024    K 2.9 2024     2024    CO2 31.0 2024    BUN 6 2024    CREATSERUM 0.66 2024     2024    CA 8.0 2024     Lab Results   Component Value Date    INR 1.34 (H) 2024       I/O last 3 completed shifts:  In: 4161.9 [P.O.:1420; I.V.:1841.9; IV PIGGYBACK:900]  Out:  [Urine:1850; Emesis/NG output:10; Drains:120; Stool:85]  I/O this shift:  In: 719.6 [P.O.:600; I.V.:19.6; IV PIGGYBACK:100]  Out: 220 [Urine:150; Drains:70]    Assessment  Patient Active Problem List   Diagnosis    IBD (inflammatory bowel disease)    Depression    Anxiety    Class 2 obesity    Type 2 diabetes mellitus without complication, without long-term current use of insulin (HCC)    Tobacco dependence     History of tobacco use disorder    Hypokalemia    Leukocytosis    Hyperglycemia    Colon perforation (HCC)    Acute left flank pain    Bowel perforation (HCC)    Acute hypoxic respiratory failure (HCC)       Postop day 4 open low anterior resection with creation of end colostomy and drainage of retroperitoneal abscess  Acute hypoxic respiratory failure, possible pneumonia  Fever    Plan:  Advance diet as tolerated to soft diet.  Discontinue Dilaudid PCA.  Continue IV antibiotics.  Continue ostomy care.  Ambulate and up to chair  DVT prophylaxis with Lovenox  GI prophylaxis with Protonix    Miran San PA-C  2/21/2024  12:48 PM

## 2024-02-21 NOTE — PROGRESS NOTES
[facility]     Hospitalist Progress Note     Trino Reddy Patient Status:  Inpatient    1985 MRN [id number]   Location [facility] 4SW-A Attending aJylan Hernandez MD   Hosp Day # 4 PCP Johnnie Mancilla MD     Chief Complaint: Abdominal pain    Subjective:     Febrile overnight  NGT removed  Tolerating FLD    Objective:    Review of Systems:   A comprehensive review of systems was completed; pertinent positive and negatives stated in subjective.    Vital signs:  Temp:  [97.5 °F (36.4 °C)-101.2 °F (38.4 °C)] 97.9 °F (36.6 °C)  Pulse:  [] 87  Resp:  [14-24] 18  BP: (141-159)/(76-96) 141/86  SpO2:  [90 %-97 %] 92 %    Physical Exam:    General: No acute distress  Respiratory: no wheezes, no rhonchi  Cardiovascular: S1, S2, regular rate and rhythm  Abdomen: Soft, midline surgical incision, clean/dry MELIA drain in place with serosanginous outpt, ostomy present   Neuro: No new focal deficits.   Extremities: no edema    Diagnostic Data:    Labs:  Recent Labs   Lab 24  0536 24  0426 24  0429 24  0506   WBC 16.3* 15.3* 16.4* 13.9* 12.3*   HGB 11.2* 11.9* 12.0* 11.4* 10.5*   MCV 91.8 92.6 89.9 94.6 90.1   .0 292.0 315.0 291.0 205.0   BAND  --   --   --  5 1   INR  --  1.34*  --   --   --        Recent Labs   Lab 24  1107 24  0536 24  0426 24  1644 24  0429 24  0506   * 241* 199* 161*  --  173* 174*   BUN 14 8* 6* 7*  --  7* 6*   CREATSERUM 0.89 0.62* 0.62* 0.58*  --  0.55* 0.66*   CA 9.4 8.4* 7.9* 7.9*  --  7.8* 8.0*   ALB 2.6* 2.1* 1.9*  --   --   --   --     139 145 142  --  144 140   K 3.0* 3.6 3.2* 3.0*  3.0* 3.0* 3.7  3.7 2.9*    110 113* 112  --  112 103   CO2 25.0 21.0 24.0 27.0  --  26.0 31.0   ALKPHO 111 94 83  --   --   --   --    AST 8* 14* 21  --   --   --   --    ALT 13* 13* 13*  --   --   --   --    BILT 1.1 1.1 1.2  --   --   --   --    TP 7.7 6.0* 5.7*  --   --   --   --         Estimated Creatinine Clearance: 174.7 mL/min (A) (based on SCr of 0.66 mg/dL (L)).    No results for input(s): \"TROP\", \"TROPHS\", \"CK\" in the last 168 hours.    Recent Labs   Lab 02/18/24  0536   PTP 16.6*   INR 1.34*                  Microbiology    Hospital Encounter on 02/17/24   1. Blood Culture     Status: None (Preliminary result)    Collection Time: 02/17/24  2:51 PM    Specimen: Blood,peripheral   Result Value Ref Range    Blood Culture Result No Growth 3 Days N/A         Imaging: Reviewed in Epic.    Medications:    potassium chloride  40 mEq Intravenous Once    Followed by    potassium chloride  20 mEq Intravenous Once    acetaminophen  1,000 mg Oral Q8H    pantoprazole  40 mg Intravenous Daily    potassium chloride  40 mEq Oral Once    insulin aspart  1-5 Units Subcutaneous Once    enoxaparin  40 mg Subcutaneous Daily    piperacillin-tazobactam  4.5 g Intravenous Q8H    insulin aspart  1-10 Units Subcutaneous q6h       Assessment & Plan:      # Septic shock, resolved    - resolved, hemodynamically stable    - IV abx   - initial Blood cx negative, febrile overnight, recultured pending     # Colitis with perforation    - s/ open low anterior resection with creation of end colostomy and drainage of retroperitoneal abscess 2/17   - Should f/u with GI as outpt once healed to r/o IBD as etiology especially given prior episode of illeitis as well.    - stop PCA - PRN Oxycodone and IV Dilaudid      # Post-op hypoxia, improving    - likely 2/2 atelectasis & PNa, will encourage IS and weaning O2 as tolerated   - Already on abx, would cover aspiration PNA    # DM2   - poor chronic control A1c 10%  - LDSSI + Accucheck QID    # Hypokalemia - replete per protocol        Supplementary Documentation:     Quality:  DVT Mechanical Prophylaxis:   SCDs, Early ambuation  DVT Pharmacologic Prophylaxis   Medication    enoxaparin (Lovenox) 40 MG/0.4ML SUBQ injection 40 mg                Code Status: Full Code  Sargent: No  urinary catheter in place  Sargent Duration (in days): 2  Central line:    BRANDY:     The 21st Century Cures Act makes medical notes like these available to patients in the interest of transparency. Please be advised this is a medical document. Medical documents are intended to carry relevant information, facts as evident, and the clinical opinion of the practitioner. The medical note is intended as peer to peer communication and may appear blunt or direct. It is written in medical language and may contain abbreviations or verbiage that are unfamiliar.

## 2024-02-21 NOTE — PLAN OF CARE
Report received from previous RN.  A&O x 4. Pt 2 L NC and using his IS up to 1500 while awake.  Pt feeing hot temp 101.9.  Spoke with CCAKRISTIE Agee  and orders for pan culture. Tylenol given as an schedule dose. Ice packs in placed.and bathe with good results. IV's not working pt hard stick, lab unable to drawn blood.  CCAPN placed a Midline. Dilaudid PCA with pain of 2/10.
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
## 2024-02-21 NOTE — PROGRESS NOTES
Patient received to 326 per bed.  VSS  Afebrile.  Alert and Oriented x 4.  02 Marvin WNL on RA  Denies nausea or emesis.  Tolerating clear liquids.  Abdomen midline incision clean, dry and intact ANDREW.  Right MELIA drain intact and draining red serosanguinous drainage.  Left colostomy bag intact with small amount yellow drainage.   Resting calmly in bed.

## 2024-02-21 NOTE — PHYSICAL THERAPY NOTE
PHYSICAL THERAPY TREATMENT NOTE - INPATIENT    Room Number: 326/326-A     Session: 1     Number of Visits to Meet Established Goals: 2    Presenting Problem: colon perforation  Co-Morbidities : DM    ASSESSMENT   Patient demonstrates good  progress this session, goals  remain in progress.    Patient continues to function below baseline with bed mobility, transfers, gait, and stair negotiation.  Contributing factors to remaining limitations include medical status.  Next session anticipate patient to progress bed mobility, transfers, gait, and stair negotiation.  Physical Therapy will continue to follow patient for duration of hospitalization.    Patient continues to benefit from continued skilled PT services: For duration of hospitalization, however, given the patient is functioning near baseline level do not anticipate skilled therapy needs at discharge .    PLAN  PT Treatment Plan: Bed mobility;Body mechanics;Endurance;Energy conservation;Patient education;Family education;Gait training;Range of motion;Strengthening;Stoop training;Stair training;Transfer training;Balance training  Rehab Potential : Good  Frequency (Obs):  (2-3x/week)    CURRENT GOALS     Goal #1 Patient is able to demonstrate supine - sit EOB @ level: supervision      Goal #2 Patient is able to demonstrate transfers Sit to/from Stand at assistance level:mod ind      Goal #3 Patient is able to ambulate 200 feet with assist device: none at assistance level: supervision      Goal #4 Patient will negotiate 3 stairs with supervision to ensure safety at LA.     Goal #5     Goal #6     Goal Comments: Goals established on 2024       SUBJECTIVE  \"Just no more than 3!\"    OBJECTIVE  Precautions: Abdominal protective strategies;NG suction;Drain(s)    WEIGHT BEARING RESTRICTION                   PAIN ASSESSMENT   Ratin  Location: L flank  Management Techniques: Activity promotion;Body mechanics;Breathing  techniques;Relaxation;Repositioning    BALANCE                                                                                                                       Static Sitting: Good  Dynamic Sitting: Good           Static Standing: Fair +  Dynamic Standing: Fair +    ACTIVITY TOLERANCE                         O2 WALK         AM-PAC '6-Clicks' INPATIENT SHORT FORM - BASIC MOBILITY  How much difficulty does the patient currently have...  Patient Difficulty: Turning over in bed (including adjusting bedclothes, sheets and blankets)?: A Little   Patient Difficulty: Sitting down on and standing up from a chair with arms (e.g., wheelchair, bedside commode, etc.): A Little   Patient Difficulty: Moving from lying on back to sitting on the side of the bed?: A Little   How much help from another person does the patient currently need...   Help from Another: Moving to and from a bed to a chair (including a wheelchair)?: A Little   Help from Another: Need to walk in hospital room?: A Little   Help from Another: Climbing 3-5 steps with a railing?: A Little       AM-PAC Score:  Raw Score: 18   Approx Degree of Impairment: 46.58%   Standardized Score (AM-PAC Scale): 43.63   CMS Modifier (G-Code): CK    FUNCTIONAL ABILITY STATUS  Gait Assessment   Functional Mobility/Gait Assessment  Gait Assistance: Supervision  Distance (ft): 200  Assistive Device: Rolling walker  Pattern: Shuffle    Skilled Therapy Provided    Bed Mobility:  Rolling: ind   Supine<>Sit: supervision   Sit<>Supine: NT     Transfer Mobility:  Sit<>Stand: supervision   Stand<>Sit: supervision   Gait: initially reaching out for IV pole.  Pt then gait trained with RW.     Therapist's Comments:  Encouraged ongoing OOB activity, to ambulate frequently in hallway.      Patient End of Session: Up in chair;Needs met;Call light within reach;RN aware of session/findings;All patient questions and concerns addressed    PT Session Time: 10 minutes  Gait Training: 10  minutes

## 2024-02-21 NOTE — PROGRESS NOTES
[facility]    Trino Reddy Patient Status:  Inpatient    1985 MRN [id number]   Location [facility] 4SW-A Attending Chadd, Tonny Ervin, *   Hosp Day # 4 PCP Johnnie Mancilla MD     Critical Care Progress Note     Date of Admission: 2024 10:57 AM  Admission Diagnosis: Bowel perforation (HCC) [K63.1]  Acute left flank pain [R10.9]     S:  Pt febrile overnight.  He is passing flatus.  Pain is controlled.       Current Medications:    Current Facility-Administered Medications:     potassium chloride 40 mEq in 250mL sodium chloride 0.9% IVPB premix, 40 mEq, Intravenous, Once **FOLLOWED BY** potassium chloride 20 mEq/100mL IVPB premix 20 mEq, 20 mEq, Intravenous, Once    acetaminophen (Tylenol Extra Strength) tab 1,000 mg, 1,000 mg, Oral, Q8H    buPROPion SR (WELLBUTRIN SR) 12 hr tab 300 mg, 300 mg, Oral, Daily    sodium chloride 0.9% infusion, , Intravenous, Continuous    naloxone (Narcan) 0.4 MG/ML injection 0.08 mg, 0.08 mg, Intravenous, Q5 Min PRN    diphenhydrAMINE (Benadryl) 50 mg/mL  injection 12.5 mg, 12.5 mg, Intravenous, Q4H PRN    ondansetron (Zofran) 4 MG/2ML injection 4 mg, 4 mg, Intravenous, Q6H PRN    HYDROmorphone in sodium chloride 0.9% (Dilaudid) 20 mg/100mL PCA premix, , Intravenous, Continuous    HYDROmorphone 0.4 mg BOLUS FROM PCA, 0.4 mg, Intravenous, Q30 Min PRN    phenol (Chloraseptic) 1.4 % oral liquid spray, , Oral, Q2H PRN    pantoprazole (Protonix) 40 mg in sodium chloride 0.9% PF 10 mL IV push, 40 mg, Intravenous, Daily    potassium chloride (K-Dur) tab 40 mEq, 40 mEq, Oral, Once    prochlorperazine (Compazine) 10 MG/2ML injection 5 mg, 5 mg, Intravenous, Q8H PRN    insulin aspart (NovoLOG) 100 Units/mL vial 1-5 Units, 1-5 Units, Subcutaneous, Once    glucose (Dex4) 15 GM/59ML oral liquid 15 g, 15 g, Oral, Q15 Min PRN **OR** glucose (Glutose) 40% oral gel 15 g, 15 g, Oral, Q15 Min PRN **OR** glucose-vitamin C (Dex-4) chewable tab 4 tablet, 4 tablet, Oral, Q15 Min PRN  **OR** dextrose 50% injection 50 mL, 50 mL, Intravenous, Q15 Min PRN **OR** glucose (Dex4) 15 GM/59ML oral liquid 30 g, 30 g, Oral, Q15 Min PRN **OR** glucose (Glutose) 40% oral gel 30 g, 30 g, Oral, Q15 Min PRN **OR** glucose-vitamin C (Dex-4) chewable tab 8 tablet, 8 tablet, Oral, Q15 Min PRN    traZODone (Desyrel) tab 50 mg, 50 mg, Oral, Nightly PRN    lactated ringers infusion, , Intravenous, Continuous    melatonin tab 3 mg, 3 mg, Oral, Nightly PRN    polyethylene glycol (PEG 3350) (Miralax) 17 g oral packet 17 g, 17 g, Oral, Daily PRN    sennosides (Senokot) tab 17.2 mg, 17.2 mg, Oral, Nightly PRN    bisacodyl (Dulcolax) 10 MG rectal suppository 10 mg, 10 mg, Rectal, Daily PRN    benzonatate (Tessalon) cap 200 mg, 200 mg, Oral, TID PRN    guaiFENesin (Robitussin) 100 MG/5 ML oral liquid 200 mg, 200 mg, Oral, Q4H PRN    glycerin-hypromellose- (Artifical Tears) 0.2-0.2-1 % ophthalmic solution 1 drop, 1 drop, Both Eyes, QID PRN    sodium chloride (Saline Mist) 0.65 % nasal solution 1 spray, 1 spray, Each Nare, Q3H PRN    enoxaparin (Lovenox) 40 MG/0.4ML SUBQ injection 40 mg, 40 mg, Subcutaneous, Daily    piperacillin-tazobactam (Zosyn) 4.5 g in dextrose 5% 100 mL IVPB-ADDV, 4.5 g, Intravenous, Q8H    insulin aspart (NovoLOG) 100 Units/mL FlexPen 1-10 Units, 1-10 Units, Subcutaneous, q6h     OBJECTIVE:  /76 (BP Location: Left arm)   Pulse 87   Temp 99.9 °F (37.7 °C) (Temporal)   Resp 18   Ht 6' 2\" (1.88 m)   Wt 289 lb 3.9 oz (131.2 kg)   SpO2 92%   BMI 37.14 kg/m²      Ventilator Settings: RA      Wt Readings from Last 3 Encounters:   02/21/24 289 lb 3.9 oz (131.2 kg)   09/27/23 271 lb 6.4 oz (123.1 kg)   06/01/23 263 lb (119.3 kg)        I/O last 3 completed shifts:  In: 4161.9 [P.O.:1420; I.V.:1841.9; IV PIGGYBACK:900]  Out: 2065 [Urine:1850; Emesis/NG output:10; Drains:120; Stool:85]  No intake/output data recorded.      Physical Exam:                          General: alert, cooperative, in  NAD                          HEENT: oropharynx clear without erythema or exudates, moist mucous membranes                          Lungs: diminished BS                          Chest wall: No tenderness or deformity.                          Heart: Regular rate and rhythm, normal S1S2                          Abdomen: soft, tender, non-distended, hypoactive BS.  Ostomy in place with serosanguinous output, MELIA drain in place                          Extremity: No clubbing or cyanosis. no edema                          Skin: No rashes or lesions.       Lab Results   Component Value Date    WBC 12.3 02/21/2024    RBC 3.62 02/21/2024    HGB 10.5 02/21/2024    HCT 32.6 02/21/2024    MCV 90.1 02/21/2024    MCH 29.0 02/21/2024    MCHC 32.2 02/21/2024    RDW 13.4 02/21/2024    .0 02/21/2024     Lab Results   Component Value Date     02/21/2024    K 2.9 02/21/2024     02/21/2024    CO2 31.0 02/21/2024    BUN 6 02/21/2024    CREATSERUM 0.66 02/21/2024     02/21/2024    CA 8.0 02/21/2024     Lab Results   Component Value Date    INR 1.34 (H) 02/18/2024           Imaging: I have independently visualized all relevant chest imaging in PACS.  I agree with the radiology interpretation except where noted.     ASSESSMENT/PLAN:  Acute hypoxic respiratory failure: expected in the post operative period, now with suspected atelectasis +/- PNA  - Intubated 2/17 for procedure, extubated in OR  - wean oxygen as able, currently 1LPM  - chest x-ray with LLL and RUL opacity concerning for PNA, on zosyn     Septic shock: 2/2 colitis with perforation + PNA, now resolved and off pressors.  Pt with fevers overnight  - repeat CXR with stable left basilar infiltrate  - follow up on repeat blood cultures  - Vasopressors to maintain MAP >65, currently off  - Zosyn (2/17- )  - MRSA PCR negative  - cultures NGTD    Colitis with perforation s/p colon resection with ostomy 2/17  - MELIA x1, monitor output  - Pain management  -  General surgery following  - encourage OOB as tolerated    DM2  - Home PO meds on hold  - Accucheck  - SSI    Anxiety/depression  - Wellbutrin    F/E/N  - IVF  - Replete electrolytes per protocol  - Diet per surgery    Proph  - SQ lovenox when ok with surgery  - SCDs  - PT/OT    Dispo  - Full code  - stable to transfer to the floor, duly pulmonary to follow peripherally upon discharge     Gaye Aldana MD

## 2024-02-21 NOTE — PLAN OF CARE
Pt reports he stopped taking wellbutrin PTA, he has been taking trazodone nightly.  He declined ostomy education yesterday, encouraged him to participate today.  Incision healing w/ staples and ostomy pink.  Drain present. On full liquids. Replacing K.  Has been walking and up in chair currently.  Had fever while on scheduled tylenol. Pending repeat cx. Continue zosyn.  Pending path.   SERA Monzon  12:03 PM

## 2024-02-22 LAB
ANION GAP SERPL CALC-SCNC: 4 MMOL/L (ref 0–18)
BASOPHILS # BLD: 0 X10(3) UL (ref 0–0.2)
BASOPHILS NFR BLD: 0 %
BUN BLD-MCNC: 4 MG/DL (ref 9–23)
CALCIUM BLD-MCNC: 8 MG/DL (ref 8.5–10.1)
CHLORIDE SERPL-SCNC: 103 MMOL/L (ref 98–112)
CO2 SERPL-SCNC: 28 MMOL/L (ref 21–32)
CREAT BLD-MCNC: 0.93 MG/DL
EGFRCR SERPLBLD CKD-EPI 2021: 107 ML/MIN/1.73M2 (ref 60–?)
EOSINOPHIL # BLD: 0.14 X10(3) UL (ref 0–0.7)
EOSINOPHIL NFR BLD: 1 %
ERYTHROCYTE [DISTWIDTH] IN BLOOD BY AUTOMATED COUNT: 13.1 %
GLUCOSE BLD-MCNC: 187 MG/DL (ref 70–99)
GLUCOSE BLD-MCNC: 200 MG/DL (ref 70–99)
GLUCOSE BLD-MCNC: 210 MG/DL (ref 70–99)
GLUCOSE BLD-MCNC: 236 MG/DL (ref 70–99)
GLUCOSE BLD-MCNC: 255 MG/DL (ref 70–99)
HCT VFR BLD AUTO: 32 %
HGB BLD-MCNC: 10.4 G/DL
LYMPHOCYTES NFR BLD: 1.08 X10(3) UL (ref 1–4)
LYMPHOCYTES NFR BLD: 8 %
MCH RBC QN AUTO: 29.2 PG (ref 26–34)
MCHC RBC AUTO-ENTMCNC: 32.5 G/DL (ref 31–37)
MCV RBC AUTO: 89.9 FL
METAMYELOCYTES # BLD: 0.54 X10(3) UL
METAMYELOCYTES NFR BLD: 4 %
MONOCYTES # BLD: 0.27 X10(3) UL (ref 0.1–1)
MONOCYTES NFR BLD: 2 %
MORPHOLOGY: NORMAL
MYELOCYTES # BLD: 0.41 X10(3) UL
MYELOCYTES NFR BLD: 3 %
NEUTROPHILS # BLD AUTO: 9.91 X10 (3) UL (ref 1.5–7.7)
NEUTROPHILS NFR BLD: 81 %
NEUTS BAND NFR BLD: 1 %
NEUTS HYPERSEG # BLD: 11.07 X10(3) UL (ref 1.5–7.7)
OSMOLALITY SERPL CALC.SUM OF ELEC: 282 MOSM/KG (ref 275–295)
PLATELET # BLD AUTO: 147 10(3)UL (ref 150–450)
PLATELET MORPHOLOGY: NORMAL
POTASSIUM SERPL-SCNC: 3.4 MMOL/L (ref 3.5–5.1)
POTASSIUM SERPL-SCNC: 3.4 MMOL/L (ref 3.5–5.1)
RBC # BLD AUTO: 3.56 X10(6)UL
SODIUM SERPL-SCNC: 135 MMOL/L (ref 136–145)
TOTAL CELLS COUNTED BLD: 100
WBC # BLD AUTO: 13.5 X10(3) UL (ref 4–11)

## 2024-02-22 PROCEDURE — 99232 SBSQ HOSP IP/OBS MODERATE 35: CPT | Performed by: INTERNAL MEDICINE

## 2024-02-22 RX ORDER — RUFINAMIDE 40 MG/ML
1 SUSPENSION ORAL DAILY
Qty: 90 TABLET | Refills: 0 | Status: SHIPPED | OUTPATIENT
Start: 2024-02-23

## 2024-02-22 RX ORDER — KETOROLAC TROMETHAMINE 15 MG/ML
15 INJECTION, SOLUTION INTRAMUSCULAR; INTRAVENOUS EVERY 6 HOURS PRN
Status: DISCONTINUED | OUTPATIENT
Start: 2024-02-22 | End: 2024-02-24

## 2024-02-22 RX ORDER — OXYCODONE HYDROCHLORIDE 5 MG/1
5 TABLET ORAL EVERY 6 HOURS PRN
Qty: 15 TABLET | Refills: 0 | Status: SHIPPED | OUTPATIENT
Start: 2024-02-22

## 2024-02-22 RX ORDER — POTASSIUM CHLORIDE 20 MEQ/1
40 TABLET, EXTENDED RELEASE ORAL ONCE
Status: COMPLETED | OUTPATIENT
Start: 2024-02-22 | End: 2024-02-22

## 2024-02-22 RX ORDER — GLIPIZIDE 5 MG/1
5 TABLET ORAL
Qty: 30 TABLET | Refills: 0 | Status: SHIPPED | OUTPATIENT
Start: 2024-02-22

## 2024-02-22 NOTE — PROGRESS NOTES
[facility]  Progress Note    Trino Reddy Patient Status:  Inpatient    1985 MRN [id number]   Location [facility] 3NW-A Attending Ramy Morfin,    Hosp Day # 5 PCP Johnnie Mancilla MD     Subjective:  The patient is doing well today.  He denies nausea or vomiting.  He is tolerating soft diet.  He denies shortness of breath or difficulty breathing.  He reports incisional tenderness.  He denies fever or chills.    Objective/Physical Exam:  General: Alert, orientated x3.  Cooperative.  No apparent distress.  Vital Signs:  Blood pressure 129/74, pulse 85, temperature 97.9 °F (36.6 °C), temperature source Oral, resp. rate 18, height 74\", weight 289 lb 3.9 oz (131.2 kg), SpO2 98%.  Lungs: No respiratory distress.  Cardiac: Regular rate and rhythm.   Abdomen:  Soft, non distended, non tender, with no rebound or guarding.  No peritoneal signs.  Ostomy is pink and functioning well with stool output noted  Extremities:  No lower extremity edema noted.    Incision: Clean, dry, intact, no erythema  Drain: With serosanguineous output    Labs:  Lab Results   Component Value Date    WBC 13.5 2024    HGB 10.4 2024    HCT 32.0 2024    .0 2024     Lab Results   Component Value Date     2024    K 3.4 2024    K 3.4 2024     2024    CO2 28.0 2024    BUN 4 2024    CREATSERUM 0.93 2024     2024    CA 8.0 2024     Lab Results   Component Value Date    INR 1.34 (H) 2024       I/O last 3 completed shifts:  In: 3948.6 [P.O.:2020; I.V.:1378.6; IV PIGGYBACK:550]  Out: 3480 [Urine:3175; Drains:195; Stool:110]  I/O this shift:  In: 240 [P.O.:240]  Out: 610 [Urine:325; Drains:35; Stool:250]    Assessment  Patient Active Problem List   Diagnosis    IBD (inflammatory bowel disease)    Depression    Anxiety    Class 2 obesity    Type 2 diabetes mellitus without complication, without long-term current use of insulin (HCC)     Tobacco dependence    History of tobacco use disorder    Hypokalemia    Leukocytosis    Hyperglycemia    Colon perforation (HCC)    Acute left flank pain    Bowel perforation (HCC)    Acute hypoxic respiratory failure (HCC)     Postop day 5 Lopes's procedure      Plan:  The patient is stable for discharge to home today from surgical standpoint once cleared by other specialties.  Continue antibiotics.  Transition to oral for discharge.  Continue ostomy care.  Analgesics as needed.  Discharge to home with drain.  Follow-up with [facility] general surgery within 1 week for drain removal, sooner if needed    Mirna San PA-C  2/22/2024  10:32 AM    This note was initiated by Mirna San PA-C.  The PA saw the patient in conjunction with me. The PA performed a history, exam, and developed the assessment and plan. I agree with the mentioned assessment and plan and have provided further documentation of my own, if necessary.    Patient is doing great.  His white blood cell count did slightly increase today.  His incision appears clean, dry and intact with staples.  The ostomy is pink and productive of stool.  His pelvic Jaden drain is productive of serous fluid.    Will trend white blood cell count again tomorrow.  If white blood cell count rises again, I recommend performing a CT scan of the abdomen and pelvis to rule out intra-abdominal abscess.  If white blood cell count normalizes, will plan to discharge home tomorrow and likely remove Jaden drain prior to discharge.    Pathology also reviewed with patient and consistent with diverticular disease without evidence of malignancy.    Jaylan Hernandez MD  [facility]

## 2024-02-22 NOTE — WOUND PROGRESS NOTE
[facility]  Inpatient Wound Ostomy Contact Note    Trino Reddy Patient Status:  Inpatient    1985 MRN [id number]   Location [facility] 3NW-A Attending Ramy Morfin,    Hosp Day # 5 PCP Johnnie Mancilla MD     Patient was seen at bedside with two family members present for second ostomy education. Patient states that he was unable to pay attention during his last education session and pouch change due to being on medication. General education was completed and ostomy nurse was also able to walk patient through a second pouch change. Recommended patient and family to go over written materials as well as educational videos provided. Patient is due to go home with Home Health and was signed up for [employer]. All questions answered. Plan of care was discussed with patient's nurse.     We will continue to follow this patient while in-house and assist with wound care discharge planning.    Thank you,    Oxana Genao, ALAN  [facility]  [address] [address]  [phone]

## 2024-02-22 NOTE — DISCHARGE INSTRUCTIONS
Drain care:  Flush IR drain with 10 ml of normal saline daily. Record drain output. Once drain output is less than 10 ml for 2 days, call      to schedule and appointment  with radiology for drain removal.        Follow up in 1 week for drain removal per surgery recs  Follow up with GI and will need to review path results  Continue soft diet      Post-Surgical Discharge Instructions    Jaylan Hernandez MD      Diet:  Continue on a soft diet until 6 weeks after the date of your surgery.  Soft diet means you can eat whatever you want, with moderation, except for the following items:    Celery                        Coconut Corn                           Dried Fruit  Green peppers            Lettuce  Mushrooms                Nuts  Olives                         Peas  Pickles                        Pineapple  Popcorn                      Spinach Raw vegetables  Seeds                          Skins of fruits and vegetables    Activity:  No heavy lifting greater than 10 lbs and no exercising for a total of 6 weeks from the date of surgery.  You may walk and climb stairs with moderation. If your abdomen is more uncomfortable than the day before, you need to be less active as you may be pulling on your stitches.    Bathing:  You may shower as often as you would like, but no submerging the incisions under water for a total of 2 weeks from the date of surgery.  This includes no swimming, no baths, and no hot-tubs.      Wound:  Keep the wound dry.  No dressing is necessary unless there is drainage coming from the wound.  If the drainage is excessive or looks like pus, please call our office.    Driving: You may drive provided that you are no longer taking your narcotic pain medication.      Bowel Function:  After surgery, your bowel movements will be irregular.  It is normal to have up to 3 bowel movements a day or as low as 1 bowel movement every 3 days.  Occasionally, your movements may be even more irregular  than this.  As long as you are not vomiting or have a fever over 100.3, you don’t need to be overly concerned.      Return to Work:  Most patients return to work after 1-2 weeks from the date of their surgery.  You will still have a lifting restriction of no greater than 10 lbs from the date of your surgery until 6 weeks.  If your work requires heavy lifting, you will need to stay off work for 6 weeks.  When you are ready to return to work, please call the office and we will send a work release to your employer.      Appointment: Please call our office for an appointment in 10-14 days after surgery, unless otherwise instructed.  This will allow ample time for the swelling and soreness to resolve before your wound is examined. If you have fevers, chills, or if you are concerned about your wound, please call us immediately at [phone].      Thank you for entrusting us with your care.    Sometimes managing your health at home requires assistance.  The [facility] team has recognized your preference to use [facility].  They can be reached by phone at [phone].  The fax number for your reference is [phone].  A representative from the home health agency will contact you or your family to schedule your first visit.

## 2024-02-22 NOTE — PLAN OF CARE
Patient is A&Ox4. On room air. Denies chest pain or shortness of breath. Abdomen is soft, tender. Bowel sounds are active. Ostomy bag with stool and gas output. Midline incision closed with staples. No drainage, redness, bruising, or swelling around incision. MELIA drain x1 to bulb suction with serosanguinous drainage. Tolerating diet. Denies nausea. Up with standby assist. IV Zosyn scheduled. All appropriate safety measures in place. All questions and concerns addressed. Plan of care discussed.

## 2024-02-22 NOTE — PLAN OF CARE
A&Ox4. Telemetry: NSR. Tolerating room air.  Denies chest pain, shortness of breath  Abdomen soft, nondistended. Passing flatus via ostomy, now with brown stool. Pain rated 4-5/10 to abdomen, hip. Relief with scheduled acetaminophen, x2 PRN oxycodone, heat pack to hip.   Up after setup, walked halls overnight.  Tolerating low fiber/soft diet. IV Zosyn scheduled.  Abdominal incision CDI. RLQ MELIA with serosanguinous output

## 2024-02-22 NOTE — PROGRESS NOTES
[facility]     Hospitalist Progress Note     Trino Reddy Patient Status:  Inpatient    1985 MRN [id number]   Location [facility] 4SW-A Attending Jaylan Hernandez MD   Hosp Day # 5 PCP Johnnie Mancilla MD     Chief Complaint: Abdominal pain    Subjective:     Afebrile overnight, tolerating diet, pain only 3-4    Objective:    Review of Systems:   A comprehensive review of systems was completed; pertinent positive and negatives stated in subjective.    Vital signs:  Temp:  [97.9 °F (36.6 °C)-98.8 °F (37.1 °C)] 98.5 °F (36.9 °C)  Pulse:  [84-93] 93  Resp:  [14-22] 18  BP: (141-176)/(72-94) 147/75  SpO2:  [91 %-95 %] 94 %    Physical Exam:    General: No acute distress  Respiratory: no wheezes, no rhonchi  Cardiovascular: S1, S2, regular rate and rhythm  Abdomen: Soft, midline surgical incision, clean/dry MELIA drain in place with serosanginous outpt, ostomy present   Neuro: No new focal deficits.   Extremities: no edema    Diagnostic Data:    Labs:  Recent Labs   Lab 24  0536 24  0426 24  0429 24  0506 24  0707   WBC 15.3* 16.4* 13.9* 12.3* 13.5*   HGB 11.9* 12.0* 11.4* 10.5* 10.4*   MCV 92.6 89.9 94.6 90.1 89.9   .0 315.0 291.0 205.0 147.0*   BAND  --   --  5 1  --    INR 1.34*  --   --   --   --        Recent Labs   Lab 24  1107 24  2159 24  0536 24  0426 24  0429 24  0506 24  1949 24  0435   * 241* 199*   < > 173* 174*  --  187*   BUN 14 8* 6*   < > 7* 6*  --  4*   CREATSERUM 0.89 0.62* 0.62*   < > 0.55* 0.66*  --  0.93   CA 9.4 8.4* 7.9*   < > 7.8* 8.0*  --  8.0*   ALB 2.6* 2.1* 1.9*  --   --   --   --   --     139 145   < > 144 140  --  135*   K 3.0* 3.6 3.2*   < > 3.7  3.7 2.9* 3.3* 3.4*  3.4*    110 113*   < > 112 103  --  103   CO2 25.0 21.0 24.0   < > 26.0 31.0  --  28.0   ALKPHO 111 94 83  --   --   --   --   --    AST 8* 14* 21  --   --   --   --   --    ALT 13* 13* 13*  --   --   --   --    --    BILT 1.1 1.1 1.2  --   --   --   --   --    TP 7.7 6.0* 5.7*  --   --   --   --   --     < > = values in this interval not displayed.       Estimated Creatinine Clearance: 124 mL/min (based on SCr of 0.93 mg/dL).    No results for input(s): \"TROP\", \"TROPHS\", \"CK\" in the last 168 hours.    Recent Labs   Lab 02/18/24  0536   PTP 16.6*   INR 1.34*                  Microbiology    Hospital Encounter on 02/17/24   1. Blood Culture     Status: None (Preliminary result)    Collection Time: 02/17/24  2:51 PM    Specimen: Blood,peripheral   Result Value Ref Range    Blood Culture Result No Growth 4 Days N/A         Imaging: Reviewed in Epic.    Medications:    pantoprazole  40 mg Oral QAM AC    multivitamin  1 tablet Oral Daily    insulin aspart  1-10 Units Subcutaneous TID AC&HS    acetaminophen  1,000 mg Oral Q8H    insulin aspart  1-5 Units Subcutaneous Once    enoxaparin  40 mg Subcutaneous Daily    piperacillin-tazobactam  4.5 g Intravenous Q8H       Assessment & Plan:      # Septic shock, resolved    - resolved, hemodynamically stable    - IV abx - Zosyn   - initial Blood cx negative, repeat send d/t recurrent fever pending    # Colitis with perforation    - s/ open low anterior resection with creation of end colostomy and drainage of retroperitoneal abscess 2/17   - Should f/u with GI as outpt once healed to r/o IBD as etiology especially given prior episode of illeitis as well.    - PRN Oxycodone and IV Dilaudid - wean as able     # Post-op hypoxia, improving    - likely 2/2 atelectasis & PNa, will encourage IS and weaning O2 as tolerated   - Already on abx, would cover aspiration PNA  - IS    # DM2   - poor chronic control A1c 10%  - LDSSI + Accucheck QID    # Hypokalemia - replete per protocol    #Acute blood loss anemia related to surgical loss  -no signis/sx of active bleeding, Hb stable, continue to monitor     POC:    Discharge planning        Supplementary Documentation:     Quality:  DVT Mechanical  Prophylaxis:   SCDs, Early ambuation  DVT Pharmacologic Prophylaxis   Medication    enoxaparin (Lovenox) 40 MG/0.4ML SUBQ injection 40 mg                Code Status: Full Code  Sargent: No urinary catheter in place    The 21st Century Cures Act makes medical notes like these available to patients in the interest of transparency. Please be advised this is a medical document. Medical documents are intended to carry relevant information, facts as evident, and the clinical opinion of the practitioner. The medical note is intended as peer to peer communication and may appear blunt or direct. It is written in medical language and may contain abbreviations or verbiage that are unfamiliar.

## 2024-02-22 NOTE — PHYSICAL THERAPY NOTE
PHYSICAL THERAPY TREATMENT NOTE - INPATIENT    Room Number: 326/326-A     Session: 2     Number of Visits to Meet Established Goals: 2    Presenting Problem: colon perforation  Co-Morbidities : DM    ASSESSMENT     Patient is currently functioning near baseline with bed mobility, transfers, gait, and stair negotiation.    Patient currently does not meet criteria for skilled inpatient physical therapy services, however patient will continue to benefit from QID ambulation with nursing staff.  Pt is hereby discharged from IP PT services.  RN aware to re-order if there is a decline in mobility status.      Will issue wide RW at discharge - PT Sue Baez informed       PLAN  PT Treatment Plan: Bed mobility;Body mechanics;Endurance;Energy conservation;Patient education;Family education;Gait training;Range of motion;Strengthening;Stoop training;Stair training;Transfer training;Balance training  Rehab Potential : Good  Frequency (Obs):  (2-3x/week)    CURRENT GOALS     Goal #1 Patient is able to demonstrate supine - sit EOB @ level: supervision      Goal #2 Patient is able to demonstrate transfers Sit to/from Stand at assistance level:mod ind      Goal #3 Patient is able to ambulate 200 feet with assist device: none at assistance level: supervision      Goal #4 Patient will negotiate 3 stairs with supervision to ensure safety at NH.     Goal #5     Goal #6     Goal Comments: Goals established on 2024       SUBJECTIVE    \"Yeah I wanted to do stairs before I go to my parents place\"    OBJECTIVE  Precautions: Abdominal protective strategies;NG suction;Drain(s)    WEIGHT BEARING RESTRICTION                   PAIN ASSESSMENT   Ratin  Location: L flank  Management Techniques: Activity promotion;Body mechanics;Breathing techniques;Relaxation;Repositioning    BALANCE                                                                                                                       Static Sitting: Good  Dynamic Sitting:  Good           Static Standing: Fair +  Dynamic Standing: Fair +    ACTIVITY TOLERANCE                         O2 WALK         AM-PAC '6-Clicks' INPATIENT SHORT FORM - BASIC MOBILITY  How much difficulty does the patient currently have...  Patient Difficulty: Turning over in bed (including adjusting bedclothes, sheets and blankets)?: A Little   Patient Difficulty: Sitting down on and standing up from a chair with arms (e.g., wheelchair, bedside commode, etc.): A Little   Patient Difficulty: Moving from lying on back to sitting on the side of the bed?: A Little   How much help from another person does the patient currently need...   Help from Another: Moving to and from a bed to a chair (including a wheelchair)?: A Little   Help from Another: Need to walk in hospital room?: A Little   Help from Another: Climbing 3-5 steps with a railing?: A Little       AM-PAC Score:  Raw Score: 18   Approx Degree of Impairment: 46.58%   Standardized Score (AM-PAC Scale): 43.63   CMS Modifier (G-Code): CK    FUNCTIONAL ABILITY STATUS  Gait Assessment   Functional Mobility/Gait Assessment  Gait Assistance: Supervision  Distance (ft): 250  Assistive Device: Rolling walker  Pattern: Shuffle  Stairs: Stairs  How Many Stairs: 12  Device: 1 Rail  Assist: Supervision  Pattern: Ascend and Descend  Ascend and Descend : Step to    Skilled Therapy Provided    Transfer Mobility:  Sit<>Stand: supervision   Stand<>Sit: supervision   Gait: feels confident with use of RW    Therapist's Comments:  Encouraged ongoing OOB activity, to ambulate frequently in hallway.      Patient End of Session: Up in chair;Needs met;Call light within reach;RN aware of session/findings;All patient questions and concerns addressed    PT Session Time: 8 minutes  Gait Trainin minutes

## 2024-02-23 ENCOUNTER — APPOINTMENT (OUTPATIENT)
Dept: CT IMAGING | Facility: HOSPITAL | Age: 39
End: 2024-02-23
Attending: STUDENT IN AN ORGANIZED HEALTH CARE EDUCATION/TRAINING PROGRAM
Payer: COMMERCIAL

## 2024-02-23 ENCOUNTER — APPOINTMENT (OUTPATIENT)
Dept: CT IMAGING | Facility: HOSPITAL | Age: 39
End: 2024-02-23
Payer: COMMERCIAL

## 2024-02-23 ENCOUNTER — APPOINTMENT (OUTPATIENT)
Dept: GENERAL RADIOLOGY | Facility: HOSPITAL | Age: 39
End: 2024-02-23
Payer: COMMERCIAL

## 2024-02-23 LAB
ANION GAP SERPL CALC-SCNC: 6 MMOL/L (ref 0–18)
BASOPHILS # BLD: 0 X10(3) UL (ref 0–0.2)
BASOPHILS # BLD: 0 X10(3) UL (ref 0–0.2)
BASOPHILS NFR BLD: 0 %
BASOPHILS NFR BLD: 0 %
BILIRUB UR QL STRIP.AUTO: NEGATIVE
BUN BLD-MCNC: 4 MG/DL (ref 9–23)
CALCIUM BLD-MCNC: 7.9 MG/DL (ref 8.5–10.1)
CHLORIDE SERPL-SCNC: 105 MMOL/L (ref 98–112)
CLARITY UR REFRACT.AUTO: CLEAR
CO2 SERPL-SCNC: 27 MMOL/L (ref 21–32)
CREAT BLD-MCNC: 0.66 MG/DL
EGFRCR SERPLBLD CKD-EPI 2021: 122 ML/MIN/1.73M2 (ref 60–?)
EOSINOPHIL # BLD: 0 X10(3) UL (ref 0–0.7)
EOSINOPHIL # BLD: 0 X10(3) UL (ref 0–0.7)
EOSINOPHIL NFR BLD: 0 %
EOSINOPHIL NFR BLD: 0 %
ERYTHROCYTE [DISTWIDTH] IN BLOOD BY AUTOMATED COUNT: 13.1 %
ERYTHROCYTE [DISTWIDTH] IN BLOOD BY AUTOMATED COUNT: 13.2 %
GLUCOSE BLD-MCNC: 159 MG/DL (ref 70–99)
GLUCOSE BLD-MCNC: 167 MG/DL (ref 70–99)
GLUCOSE BLD-MCNC: 185 MG/DL (ref 70–99)
GLUCOSE BLD-MCNC: 195 MG/DL (ref 70–99)
GLUCOSE BLD-MCNC: 197 MG/DL (ref 70–99)
GLUCOSE UR STRIP.AUTO-MCNC: 30 MG/DL
HCT VFR BLD AUTO: 33.7 %
HCT VFR BLD AUTO: 34.9 %
HGB BLD-MCNC: 10.8 G/DL
HGB BLD-MCNC: 11.3 G/DL
INR BLD: 1.19 (ref 0.8–1.2)
KETONES UR STRIP.AUTO-MCNC: NEGATIVE MG/DL
LEUKOCYTE ESTERASE UR QL STRIP.AUTO: NEGATIVE
LYMPHOCYTES NFR BLD: 1.62 X10(3) UL (ref 1–4)
LYMPHOCYTES NFR BLD: 1.86 X10(3) UL (ref 1–4)
LYMPHOCYTES NFR BLD: 10 %
LYMPHOCYTES NFR BLD: 11 %
MCH RBC QN AUTO: 29.2 PG (ref 26–34)
MCH RBC QN AUTO: 29.6 PG (ref 26–34)
MCHC RBC AUTO-ENTMCNC: 32 G/DL (ref 31–37)
MCHC RBC AUTO-ENTMCNC: 32.4 G/DL (ref 31–37)
MCV RBC AUTO: 91.1 FL
MCV RBC AUTO: 91.4 FL
METAMYELOCYTES # BLD: 0.16 X10(3) UL
METAMYELOCYTES NFR BLD: 1 %
MONOCYTES # BLD: 0.51 X10(3) UL (ref 0.1–1)
MONOCYTES # BLD: 0.65 X10(3) UL (ref 0.1–1)
MONOCYTES NFR BLD: 3 %
MONOCYTES NFR BLD: 4 %
MORPHOLOGY: NORMAL
MYELOCYTES # BLD: 0.32 X10(3) UL
MYELOCYTES NFR BLD: 2 %
NEUTROPHILS # BLD AUTO: 12.9 X10 (3) UL (ref 1.5–7.7)
NEUTROPHILS # BLD AUTO: 13.33 X10 (3) UL (ref 1.5–7.7)
NEUTROPHILS NFR BLD: 79 %
NEUTROPHILS NFR BLD: 84 %
NEUTS BAND NFR BLD: 2 %
NEUTS BAND NFR BLD: 4 %
NEUTS HYPERSEG # BLD: 13.45 X10(3) UL (ref 1.5–7.7)
NEUTS HYPERSEG # BLD: 14.53 X10(3) UL (ref 1.5–7.7)
NITRITE UR QL STRIP.AUTO: NEGATIVE
OSMOLALITY SERPL CALC.SUM OF ELEC: 288 MOSM/KG (ref 275–295)
PH UR STRIP.AUTO: 6 [PH] (ref 5–8)
PLATELET # BLD AUTO: 131 10(3)UL (ref 150–450)
PLATELET # BLD AUTO: 92 10(3)UL (ref 150–450)
PLATELET MORPHOLOGY: NORMAL
POTASSIUM SERPL-SCNC: 3.2 MMOL/L (ref 3.5–5.1)
POTASSIUM SERPL-SCNC: 3.2 MMOL/L (ref 3.5–5.1)
PROT UR STRIP.AUTO-MCNC: NEGATIVE MG/DL
PROTHROMBIN TIME: 15.1 SECONDS (ref 11.6–14.8)
RBC # BLD AUTO: 3.7 X10(6)UL
RBC # BLD AUTO: 3.82 X10(6)UL
RBC UR QL AUTO: NEGATIVE
SODIUM SERPL-SCNC: 138 MMOL/L (ref 136–145)
SP GR UR STRIP.AUTO: 1.01 (ref 1–1.03)
TOTAL CELLS COUNTED BLD: 100
TOTAL CELLS COUNTED BLD: 100
UROBILINOGEN UR STRIP.AUTO-MCNC: NORMAL MG/DL
WBC # BLD AUTO: 16.2 X10(3) UL (ref 4–11)
WBC # BLD AUTO: 16.9 X10(3) UL (ref 4–11)

## 2024-02-23 PROCEDURE — 87070 CULTURE OTHR SPECIMN AEROBIC: CPT | Performed by: STUDENT IN AN ORGANIZED HEALTH CARE EDUCATION/TRAINING PROGRAM

## 2024-02-23 PROCEDURE — 99152 MOD SED SAME PHYS/QHP 5/>YRS: CPT | Performed by: STUDENT IN AN ORGANIZED HEALTH CARE EDUCATION/TRAINING PROGRAM

## 2024-02-23 PROCEDURE — 87205 SMEAR GRAM STAIN: CPT | Performed by: STUDENT IN AN ORGANIZED HEALTH CARE EDUCATION/TRAINING PROGRAM

## 2024-02-23 PROCEDURE — 87076 CULTURE ANAEROBE IDENT EACH: CPT | Performed by: STUDENT IN AN ORGANIZED HEALTH CARE EDUCATION/TRAINING PROGRAM

## 2024-02-23 PROCEDURE — 87077 CULTURE AEROBIC IDENTIFY: CPT | Performed by: STUDENT IN AN ORGANIZED HEALTH CARE EDUCATION/TRAINING PROGRAM

## 2024-02-23 PROCEDURE — 99232 SBSQ HOSP IP/OBS MODERATE 35: CPT | Performed by: INTERNAL MEDICINE

## 2024-02-23 PROCEDURE — 49406 IMAGE CATH FLUID PERI/RETRO: CPT | Performed by: STUDENT IN AN ORGANIZED HEALTH CARE EDUCATION/TRAINING PROGRAM

## 2024-02-23 PROCEDURE — 0W9H3ZZ DRAINAGE OF RETROPERITONEUM, PERCUTANEOUS APPROACH: ICD-10-PCS | Performed by: RADIOLOGY

## 2024-02-23 PROCEDURE — 87075 CULTR BACTERIA EXCEPT BLOOD: CPT | Performed by: STUDENT IN AN ORGANIZED HEALTH CARE EDUCATION/TRAINING PROGRAM

## 2024-02-23 PROCEDURE — 71046 X-RAY EXAM CHEST 2 VIEWS: CPT

## 2024-02-23 PROCEDURE — 99153 MOD SED SAME PHYS/QHP EA: CPT | Performed by: STUDENT IN AN ORGANIZED HEALTH CARE EDUCATION/TRAINING PROGRAM

## 2024-02-23 PROCEDURE — 74177 CT ABD & PELVIS W/CONTRAST: CPT

## 2024-02-23 RX ORDER — POTASSIUM CHLORIDE 20 MEQ/1
20 TABLET, EXTENDED RELEASE ORAL ONCE
Status: COMPLETED | OUTPATIENT
Start: 2024-02-23 | End: 2024-02-23

## 2024-02-23 RX ORDER — FLUMAZENIL 0.1 MG/ML
INJECTION INTRAVENOUS
Status: DISPENSED
Start: 2024-02-23 | End: 2024-02-24

## 2024-02-23 RX ORDER — MIDAZOLAM HYDROCHLORIDE 1 MG/ML
1 INJECTION INTRAMUSCULAR; INTRAVENOUS EVERY 5 MIN PRN
Status: DISCONTINUED | OUTPATIENT
Start: 2024-02-23 | End: 2024-02-23 | Stop reason: HOSPADM

## 2024-02-23 RX ORDER — NALOXONE HYDROCHLORIDE 0.4 MG/ML
80 INJECTION, SOLUTION INTRAMUSCULAR; INTRAVENOUS; SUBCUTANEOUS AS NEEDED
Status: DISCONTINUED | OUTPATIENT
Start: 2024-02-23 | End: 2024-02-23 | Stop reason: HOSPADM

## 2024-02-23 RX ORDER — NALOXONE HYDROCHLORIDE 0.4 MG/ML
INJECTION, SOLUTION INTRAMUSCULAR; INTRAVENOUS; SUBCUTANEOUS
Status: DISPENSED
Start: 2024-02-23 | End: 2024-02-24

## 2024-02-23 RX ORDER — POTASSIUM CHLORIDE 20 MEQ/1
40 TABLET, EXTENDED RELEASE ORAL ONCE
Status: COMPLETED | OUTPATIENT
Start: 2024-02-23 | End: 2024-02-23

## 2024-02-23 RX ORDER — AMOXICILLIN AND CLAVULANATE POTASSIUM 875; 125 MG/1; MG/1
1 TABLET, FILM COATED ORAL 2 TIMES DAILY
Qty: 14 TABLET | Refills: 0 | Status: SHIPPED | OUTPATIENT
Start: 2024-02-23 | End: 2024-03-01

## 2024-02-23 RX ORDER — FLUMAZENIL 0.1 MG/ML
0.2 INJECTION INTRAVENOUS AS NEEDED
Status: DISCONTINUED | OUTPATIENT
Start: 2024-02-23 | End: 2024-02-23 | Stop reason: HOSPADM

## 2024-02-23 RX ORDER — MIDAZOLAM HYDROCHLORIDE 1 MG/ML
INJECTION INTRAMUSCULAR; INTRAVENOUS
Status: COMPLETED
Start: 2024-02-23 | End: 2024-02-23

## 2024-02-23 RX ORDER — SODIUM CHLORIDE 9 MG/ML
INJECTION, SOLUTION INTRAVENOUS CONTINUOUS
Status: DISCONTINUED | OUTPATIENT
Start: 2024-02-23 | End: 2024-02-23 | Stop reason: HOSPADM

## 2024-02-23 NOTE — PROGRESS NOTES
Pt here for image guided retroperitoneal abscess drain placement. IV access to right forearm saline lock. 0.9 NS IV initiated to maintain patency.  Informed consent obtained by Dr Nestor Quiroga. Positioned pt on scanner. Obtained biopsy. Specimen sent to Pathology.    Presently denies pain. Tolerated procedure well. Vital signs stable on room air.  SBAR report given to ALAN Alston. Transported pt to  326 accompanied by Richard PHILLIPS.

## 2024-02-23 NOTE — PLAN OF CARE
Alert and oriented. Telemetry: NSR  Denies chest pain, shortness of breath  1-piece colostomy in place, CDI, loose/soft brown stool output with gas.   Midline incision CDI.  MELIA drain with serosanguinous output.   Pain 3/10 - 5/10, hip and abdominal pain. Repositioned between chair and bed with some relief. PRN oxycodone + toradol with relief. Denies nausea.  Up independent after setup.   Diet: Regular diet  Plan: Continue ambulation, IS, diet tolerance, ostomy+MELIA care, follow labs

## 2024-02-23 NOTE — PROGRESS NOTES
[facility]  Progress Note    Trino Reddy Patient Status:  Inpatient    1985 MRN [id number]   Location [facility] 3NW-A Attending Ramy Morfin,    Hosp Day # 6 PCP Johnnie Mancilla MD     Subjective:  The patient was seen and examined this resting in his chair.  He states he is tolerating a diet without nausea or vomiting.  He has some abdominal soreness.  His ostomy is functioning.    The patient had a low-grade fever with a Tmax of 99.  WBCs increased to 16.2 today.  Heart rate in the 90s.    Objective/Physical Exam:  /72 (BP Location: Left arm)   Pulse 97   Temp 98.7 °F (37.1 °C) (Oral)   Resp 20   Ht 74\"   Wt 289 lb 3.9 oz (131.2 kg)   SpO2 98%   BMI 37.14 kg/m²     Intake/Output Summary (Last 24 hours) at 2024 1113  Last data filed at 2024 1042  Gross per 24 hour   Intake 3030 ml   Output 1580 ml   Net 1450 ml         General: Alert, oriented x3. No acute distress.  HEENT: Normocephalic, atraumatic. No scleral icterus.  Pulmonary: No respiratory distress, effort normal.   Abdomen: Non-distended, without tympany to percussion. Soft, incisional site tenderness to palpation. No rebound or guarding. No peritoneal signs.  Left abdominal stoma  Stoma: Pink, viable and functioning  Incision: Midline incision closed with staples is clean, dry, intact without surrounding erythema or cellulitis  Extremities: No lower extremity edema. No clubbing or cyanosis.   Skin: Warm, dry. No jaundice.       Labs:  Lab Results   Component Value Date    WBC 16.9 2024    HGB 10.8 2024    HCT 33.7 2024    PLT 92.0 2024      Lab Results   Component Value Date    INR 1.34 (H) 2024     Lab Results   Component Value Date     2024    K 3.2 2024    K 3.2 2024     2024    CO2 27.0 2024    BUN 4 2024    CREATSERUM 0.66 2024     2024    CA 7.9 2024     Lab Results   Component Value Date    COLORUR  Light-Yellow 02/23/2024    CLARITY Clear 02/23/2024    SPECGRAVITY 1.011 02/23/2024    GLUUR 30 02/23/2024    BILUR Negative 02/23/2024    KETUR Negative 02/23/2024    BLOODURINE Negative 02/23/2024    PHURINE 6.0 02/23/2024    PROUR Negative 02/23/2024    UROBILINOGEN Normal 02/23/2024    NITRITE Negative 02/23/2024    LEUUR Negative 02/23/2024       Assessment:  Patient Active Problem List   Diagnosis    IBD (inflammatory bowel disease)    Depression    Anxiety    Class 2 obesity    Type 2 diabetes mellitus without complication, without long-term current use of insulin (HCC)    Tobacco dependence    History of tobacco use disorder    Hypokalemia    Leukocytosis    Hyperglycemia    Colon perforation (HCC)    Acute left flank pain    Bowel perforation (HCC)    Acute hypoxic respiratory failure (HCC)     POD 6 Lopes's procedure    Plan:  We will order a CT scan of the abdomen and pelvis with contrast to evaluate for the etiology of the patient's leukocytosis.  We will also order a urinalysis and chest x-ray.  Blood cultures from 2/21 have no growth to date  The patient n.p.o. until after CT scan results  Antiemetics and analgesics as needed  Drain, wound and ostomy care  Encourage ambulation  Encourage incentive spirometer  Continue IV antibiotics-Zosyn  DVT prophylaxis with Lovenox  GI prophylaxis with Protonix      My total face time with this patient was 25 minutes. Greater than half of the visit was spent in counseling the patient on the above listed diagnoses and treatment options.     Alma Busby PA-C  2/23/2024  11:13 AM    This note was initiated by Alma Busby PA-C.  The PA saw the patient in conjunction with me. The PA performed a history, exam, and developed the assessment and plan. I agree with the mentioned assessment and plan and have provided further documentation of my own, if necessary.    Patient is doing great clinically though white blood cell count did continue to rise again  today to 16.2.  CT scan performed around noon today and shows a sizable multiloculated abscess in the left retroperitoneum.  Patient has been tolerating diet and having stool from his ostomy.  He has been ambulating without issues.  He is anxious to go home soon as possible.    Patient has had low-grade fevers was otherwise hemodynamically normal.  His abdomen is soft, nondistended and appropriate tender to palpation.  His left-sided colostomy is pink and productive of brown stool.  His midline incision is clean, dry and intact with deni.  His Jaden drain has scant serous output.    I spoke to Dr. Moon of interventional radiology.  I appreciate his input.  Plan is to perform image guided percutaneous drain placement into the retroperitoneal abscess.  If this procedure goes well, patient is okay to go home today.  Will remove Jaden drain prior to discharge.  I recommend an additional 7-day course of antibiotics to go home with.  Continue multimodal pain control.  No diet restrictions.  Continue routine ostomy care. Continue DVT prophylaxis while hospitalized.  Encourage out of bed and ambulation as able.  Patient should follow-up with [facility] general surgery in 1-2 weeks for staple removal.  IR drain follow-up to be arranged by IR department.    Jaylan Hernandez MD  [facility]

## 2024-02-23 NOTE — PLAN OF CARE
Patient is A&Ox4. On room air. Denies chest pain or shortness of breath. Abdomen is soft, tender. Bowel sounds are active. Ostomy bag with stool and gas output. Midline incision closed with staples. No drainage, redness, bruising, or swelling around incision. MELIA drain x1 to bulb suction with serosanguinous/serous drainage. Tolerating diet with good appetite, but is NPO for CT abdomen/IR drain placement later today. Denies nausea. Up with standby assist--uses IV pole or walker for assistance when up. Telemetry reading NSR to ST at times. IV Zosyn scheduled. All appropriate safety measures in place. All questions and concerns addressed. Plan of care discussed.

## 2024-02-23 NOTE — PROCEDURES
[facility]  Procedure Note    Trino Reddy Patient Status:  Inpatient    1985 MRN [id number]   Location [facility] 3NW-A Attending Ramy Morfin,    Hosp Day # 6 PCP Johnnie Mancilla MD     Procedure: CT guided abscess drainage    Pre-Procedure Diagnosis:  retroperitoneal abscess    Post-Procedure Diagnosis: same    Anesthesia:  Local and Sedation    Findings:  pus    Specimens: 300 ml    Blood Loss:  <10 ml    Tourniquet Time: n/a  Complications:  None  Drains:  10.2 Fr drain LLQ    Secondary Diagnosis:  none    Nestor Quiroga MD  2024

## 2024-02-23 NOTE — OCCUPATIONAL THERAPY NOTE
OCCUPATIONAL THERAPY TREATMENT NOTE - INPATIENT     Room Number: 454/454-A  Session: 1   Number of Visits to Meet Established Goals: 1    Presenting Problem: colon perforation, 2/17 OPEN LOWER ANTERIOR RESECTION, CREATION OF END COLOSTMY, DRAINAGE OF RETROPERITONEAL ABSCESS, septic shock      ASSESSMENT   Patient demonstrates excellent progress this session, all goals met at this time. Recommend pt DC home when medically ready.     History Related to Current Admission: Patient is a 39 year old male admitted on 2/17/2024 with Presenting Problem: colon perforation, 2/17 OPEN LOWER ANTERIOR RESECTION, CREATION OF END COLOSTMY, DRAINAGE OF RETROPERITONEAL ABSCESS, septic shock. Co-Morbidities : DM. Pt was admitted from home on 2/17 with abdominal pain.  Admitted for colon perforation and septic shock. 2/17 s/p Open low anterior colon resection with creation of end colostomy and drainage of retroperitoneal abscess. Post-op hypoxia and hypotension.       OT Device Recommendations: TBD      WEIGHT BEARING RESTRICTION  Weight Bearing Restriction: None                Recommendations for nursing staff:   Transfers: supervision with RW  Toileting location: toilet     TREATMENT SESSION:  Patient Start of Session: Pt was found sitting in his bed.   FUNCTIONAL TRANSFER ASSESSMENT  Sit to Stand: Edge of Bed  Edge of Bed: Supervision    BED MOBILITY  Supine to Sit : Supervision    BALANCE ASSESSMENT     FUNCTIONAL ADL ASSESSMENT       ACTIVITY TOLERANCE:                          O2 SATURATIONS       EDUCATION PROVIDED  Patient: Plan of Care; Role of Occupational Therapy; Discharge Recommendations; Functional Transfer Techniques; Surgical Precautions; Fall Prevention  Patient's Response to Education: Verbalized Understanding      Equipment used: RW, gait belt   Demonstrates functional use, Would benefit from additional trial       Therapist comments: supine>sit EOB>stand to RW>walk in hallway>sitting in chair    Patient End of  Session: Up in chair;Needs met;Call light within reach;RN aware of session/findings;All patient questions and concerns addressed    SUBJECTIVE  \"I was wearing a headband soaked in cold water.\"     PAIN ASSESSMENT  Ratin  Location: abd  Management Techniques: Activity promotion;Relaxation;Repositioning     OBJECTIVE  Precautions: Abdominal protective strategies;NG suction;Drain(s)    AM-PAC ‘6-Clicks’ Inpatient Daily Activity Short Form  -   Putting on and taking off regular lower body clothing?: A Little  -   Bathing (including washing, rinsing, drying)?: A Little  -   Toileting, which includes using toilet, bedpan or urinal? : A Little  -   Putting on and taking off regular upper body clothing?: None  -   Taking care of personal grooming such as brushing teeth?: None  -   Eating meals?: None    AM-PAC Score:  Score: 21  Approx Degree of Impairment: 32.79%  Standardized Score (AM-PAC Scale): 44.27    PLAN  OT Treatment Plan: Balance activities;Energy conservation/work simplification techniques;ADL training;Patient/Family education;Patient/Family training;Compensatory technique education  Rehab Potential : Fair  Frequency: 3x/week    OT Goals: all goals met , LS     ADL Goals   Patient will perform grooming: with modified independent and while standing at sink  Patient will perform upper body dressing:  with modified independent  Patient will perform lower body dressing:  with modified independent  Patient will perform toileting: with modified independent     Functional Transfer Goals  Patient will transfer to toilet:  with modified independent     Additional Goals  Pt will recall abdominal protection principles.    OT Session Time: 30 minutes  Therapeutic Activity: 15 minutes

## 2024-02-23 NOTE — PROGRESS NOTES
[facility]     Hospitalist Progress Note     Trino Reddy Patient Status:  Inpatient    1985 MRN [id number]   Location [facility] 4SW-A Attending Jaylan Hernandez MD   Hosp Day # 6 PCP Johnnie Mancilla MD     Chief Complaint: Abdominal pain    Subjective:     Feels fine but WBC uptrending     Objective:    Review of Systems:   A comprehensive review of systems was completed; pertinent positive and negatives stated in subjective.    Vital signs:  Temp:  [97.9 °F (36.6 °C)-99 °F (37.2 °C)] 98.7 °F (37.1 °C)  Pulse:  [92-99] 97  Resp:  [16-20] 20  BP: (125-151)/(52-86) 133/72  SpO2:  [96 %-98 %] 98 %    Physical Exam:    General: No acute distress  Respiratory: no wheezes, no rhonchi  Cardiovascular: S1, S2, regular rate and rhythm  Abdomen: Soft, midline surgical incision, clean/dry MELIA drain in place with serosanginous outpt, ostomy present   Neuro: No new focal deficits.   Extremities: no edema    Diagnostic Data:    Labs:  Recent Labs   Lab 24  0536 24  0426 24  0429 24  0506 24  0707 24  0550 24  1044   WBC 15.3* 16.4* 13.9* 12.3* 13.5* 16.2* 16.9*   HGB 11.9* 12.0* 11.4* 10.5* 10.4* 11.3* 10.8*   MCV 92.6 89.9 94.6 90.1 89.9 91.4 91.1   .0 315.0 291.0 205.0 147.0* 92.0*  --    BAND  --   --  5 1 1 4  --    INR 1.34*  --   --   --   --   --   --        Recent Labs   Lab 24  1107 24  2159 24  0536 24  0426 24  0506 24  1949 24  0435 24  0550   * 241* 199*   < > 174*  --  187* 197*   BUN 14 8* 6*   < > 6*  --  4* 4*   CREATSERUM 0.89 0.62* 0.62*   < > 0.66*  --  0.93 0.66*   CA 9.4 8.4* 7.9*   < > 8.0*  --  8.0* 7.9*   ALB 2.6* 2.1* 1.9*  --   --   --   --   --     139 145   < > 140  --  135* 138   K 3.0* 3.6 3.2*   < > 2.9* 3.3* 3.4*  3.4* 3.2*  3.2*    110 113*   < > 103  --  103 105   CO2 25.0 21.0 24.0   < > 31.0  --  28.0 27.0   ALKPHO 111 94 83  --   --   --   --   --    AST 8*  14* 21  --   --   --   --   --    ALT 13* 13* 13*  --   --   --   --   --    BILT 1.1 1.1 1.2  --   --   --   --   --    TP 7.7 6.0* 5.7*  --   --   --   --   --     < > = values in this interval not displayed.       Estimated Creatinine Clearance: 174.7 mL/min (A) (based on SCr of 0.66 mg/dL (L)).    No results for input(s): \"TROP\", \"TROPHS\", \"CK\" in the last 168 hours.    Recent Labs   Lab 02/18/24  0536   PTP 16.6*   INR 1.34*                  Microbiology    Hospital Encounter on 02/17/24   1. Blood Culture     Status: None (Preliminary result)    Collection Time: 02/21/24  8:15 AM    Specimen: Blood,peripheral   Result Value Ref Range    Blood Culture Result No Growth 1 Day N/A         Imaging: Reviewed in Epic.    Medications:    potassium chloride  20 mEq Oral Once    pantoprazole  40 mg Oral QAM AC    multivitamin  1 tablet Oral Daily    insulin aspart  1-10 Units Subcutaneous TID AC&HS    acetaminophen  1,000 mg Oral Q8H    insulin aspart  1-5 Units Subcutaneous Once    enoxaparin  40 mg Subcutaneous Daily    piperacillin-tazobactam  4.5 g Intravenous Q8H       Assessment & Plan:      # Septic shock, resolved    - resolved, hemodynamically stable    - IV abx - Zosyn   - Repeat blood cultures remain without growth     # Colitis with perforation    - s/ open low anterior resection with creation of end colostomy and drainage of retroperitoneal abscess 2/17   - Should f/u with GI as outpt once healed to r/o IBD as etiology especially given prior episode of illeitis as well.    - PRN Oxycodone and IV Dilaudid - wean as able  -Uptrending WBC count - Repeat CT pending      # Post-op hypoxia, improving    - likely 2/2 atelectasis & PNa, will encourage IS and weaning O2 as tolerated   - Already on abx, would cover aspiration PNA  - IS    # DM2   - poor chronic control A1c 10%  - LDSSI + Accucheck QID    # Hypokalemia - replete per protocol    #Acute blood loss anemia related to surgical loss  -no signis/sx of active  bleeding, Hb stable, continue to monitor     #Acute Thrombocytopenia  -Hold Lovenox  -HIPA     POC:    Discharge on hold Repeat CT pending        Supplementary Documentation:     Quality:  DVT Mechanical Prophylaxis:   SCDs, Early ambuation  DVT Pharmacologic Prophylaxis   Medication    enoxaparin (Lovenox) 40 MG/0.4ML SUBQ injection 40 mg                Code Status: Full Code  Sargent: No urinary catheter in place    The 21st Century Cures Act makes medical notes like these available to patients in the interest of transparency. Please be advised this is a medical document. Medical documents are intended to carry relevant information, facts as evident, and the clinical opinion of the practitioner. The medical note is intended as peer to peer communication and may appear blunt or direct. It is written in medical language and may contain abbreviations or verbiage that are unfamiliar.

## 2024-02-24 VITALS
WEIGHT: 289.25 LBS | OXYGEN SATURATION: 97 % | RESPIRATION RATE: 18 BRPM | TEMPERATURE: 98 F | HEIGHT: 74 IN | HEART RATE: 93 BPM | BODY MASS INDEX: 37.12 KG/M2 | SYSTOLIC BLOOD PRESSURE: 144 MMHG | DIASTOLIC BLOOD PRESSURE: 79 MMHG

## 2024-02-24 LAB
ALBUMIN SERPL-MCNC: 1.6 G/DL (ref 3.4–5)
ALBUMIN/GLOB SERPL: 0.3 {RATIO} (ref 1–2)
ALP LIVER SERPL-CCNC: 94 U/L
ALT SERPL-CCNC: 54 U/L
ANION GAP SERPL CALC-SCNC: 5 MMOL/L (ref 0–18)
ANION GAP SERPL CALC-SCNC: 5 MMOL/L (ref 0–18)
AST SERPL-CCNC: 58 U/L (ref 15–37)
BASOPHILS # BLD: 0 X10(3) UL (ref 0–0.2)
BASOPHILS NFR BLD: 0 %
BILIRUB SERPL-MCNC: 0.8 MG/DL (ref 0.1–2)
BUN BLD-MCNC: 4 MG/DL (ref 9–23)
BUN BLD-MCNC: 4 MG/DL (ref 9–23)
CALCIUM BLD-MCNC: 7.9 MG/DL (ref 8.5–10.1)
CALCIUM BLD-MCNC: 7.9 MG/DL (ref 8.5–10.1)
CHLORIDE SERPL-SCNC: 102 MMOL/L (ref 98–112)
CHLORIDE SERPL-SCNC: 102 MMOL/L (ref 98–112)
CO2 SERPL-SCNC: 26 MMOL/L (ref 21–32)
CO2 SERPL-SCNC: 26 MMOL/L (ref 21–32)
CREAT BLD-MCNC: 0.64 MG/DL
CREAT BLD-MCNC: 0.64 MG/DL
EGFRCR SERPLBLD CKD-EPI 2021: 123 ML/MIN/1.73M2 (ref 60–?)
EGFRCR SERPLBLD CKD-EPI 2021: 123 ML/MIN/1.73M2 (ref 60–?)
EOSINOPHIL # BLD: 0.15 X10(3) UL (ref 0–0.7)
EOSINOPHIL NFR BLD: 1 %
ERYTHROCYTE [DISTWIDTH] IN BLOOD BY AUTOMATED COUNT: 13.2 %
GLOBULIN PLAS-MCNC: 4.7 G/DL (ref 2.8–4.4)
GLUCOSE BLD-MCNC: 180 MG/DL (ref 70–99)
GLUCOSE BLD-MCNC: 180 MG/DL (ref 70–99)
GLUCOSE BLD-MCNC: 187 MG/DL (ref 70–99)
GLUCOSE BLD-MCNC: 259 MG/DL (ref 70–99)
HCT VFR BLD AUTO: 34.4 %
HGB BLD-MCNC: 11.1 G/DL
LYMPHOCYTES NFR BLD: 0.76 X10(3) UL (ref 1–4)
LYMPHOCYTES NFR BLD: 5 %
MCH RBC QN AUTO: 29.1 PG (ref 26–34)
MCHC RBC AUTO-ENTMCNC: 32.3 G/DL (ref 31–37)
MCV RBC AUTO: 90.1 FL
METAMYELOCYTES # BLD: 0.15 X10(3) UL
METAMYELOCYTES NFR BLD: 1 %
MONOCYTES # BLD: 1.21 X10(3) UL (ref 0.1–1)
MONOCYTES NFR BLD: 8 %
MORPHOLOGY: NORMAL
NEUTROPHILS # BLD AUTO: 10.9 X10 (3) UL (ref 1.5–7.7)
NEUTROPHILS NFR BLD: 80 %
NEUTS BAND NFR BLD: 5 %
NEUTS HYPERSEG # BLD: 12.84 X10(3) UL (ref 1.5–7.7)
OSMOLALITY SERPL CALC.SUM OF ELEC: 277 MOSM/KG (ref 275–295)
OSMOLALITY SERPL CALC.SUM OF ELEC: 277 MOSM/KG (ref 275–295)
PLATELET # BLD AUTO: 95 10(3)UL (ref 150–450)
PLATELET MORPHOLOGY: NORMAL
POTASSIUM SERPL-SCNC: 3.7 MMOL/L (ref 3.5–5.1)
POTASSIUM SERPL-SCNC: 3.7 MMOL/L (ref 3.5–5.1)
PROT SERPL-MCNC: 6.3 G/DL (ref 6.4–8.2)
RBC # BLD AUTO: 3.82 X10(6)UL
SODIUM SERPL-SCNC: 133 MMOL/L (ref 136–145)
SODIUM SERPL-SCNC: 133 MMOL/L (ref 136–145)
TOTAL CELLS COUNTED BLD: 100
WBC # BLD AUTO: 15.1 X10(3) UL (ref 4–11)

## 2024-02-24 PROCEDURE — 99239 HOSP IP/OBS DSCHRG MGMT >30: CPT | Performed by: INTERNAL MEDICINE

## 2024-02-24 RX ORDER — METRONIDAZOLE 500 MG/1
500 TABLET ORAL 3 TIMES DAILY
Qty: 21 TABLET | Refills: 0 | Status: SHIPPED | OUTPATIENT
Start: 2024-02-24 | End: 2024-03-02

## 2024-02-24 NOTE — CM/SW NOTE
CM called patient for discharge planning follow up, left message for call back.     Update:  9525: CM spoke to patient for update; patient chose [facility] for discharge.  Agency reserved in aidin system and follow up requested.     Sarah Lopez RN Case Manager [phone]

## 2024-02-24 NOTE — PROGRESS NOTES
[facility]  Progress Note    Trino Reddy Patient Status:  Inpatient    1985 MRN [id number]   Location [facility] 3NW-A Attending Ramy Morfin,    Hosp Day # 7 PCP Johnnie Mancilla MD     Subjective:  The patient is doing well today.  He reports incisional pain.  He denies nausea or vomiting.  He is tolerating soft diet.  He reports good output from his ostomy.  IR drain is functioning well with purulent output noted.    Objective/Physical Exam:  General: Alert, orientated x3.  Cooperative.  No apparent distress.  Vital Signs:  Blood pressure 144/79, pulse 93, temperature 98 °F (36.7 °C), temperature source Oral, resp. rate 18, height 74\", weight 289 lb 3.9 oz (131.2 kg), SpO2 97%.  Lungs: No respiratory distress.  Cardiac: Regular rate and rhythm.   Abdomen:  Soft, non distended, non tender, with no rebound or guarding.  No peritoneal signs.   Extremities:  No lower extremity edema noted.    Incision: Clean, dry, intact, no erythema  Drain: Jaden drain with serous output noted.  IR drain with purulent fluid noted    Labs:  Lab Results   Component Value Date    WBC 15.1 2024    HGB 11.1 2024    HCT 34.4 2024    PLT 95.0 2024     Lab Results   Component Value Date     2024     2024    K 3.7 2024    K 3.7 2024     2024     2024    CO2 26.0 2024    CO2 26.0 2024    BUN 4 2024    BUN 4 2024    CREATSERUM 0.64 2024    CREATSERUM 0.64 2024     2024     2024    CA 7.9 2024    CA 7.9 2024    ALKPHO 94 2024    ALT 54 2024    AST 58 2024    BILT 0.8 2024    ALB 1.6 2024    TP 6.3 2024     Lab Results   Component Value Date    INR 1.19 2024    INR 1.34 (H) 2024       I/O last 3 completed shifts:  In: 2842 [P.O.:2000; I.V.:822]  Out: 3910 [Urine:2525; Drains:710; Stool:675]  I/O this shift:  In: 730  [P.O.:720]  Out: 98 [Drains:98]    Assessment  Patient Active Problem List   Diagnosis    IBD (inflammatory bowel disease)    Depression    Anxiety    Class 2 obesity    Type 2 diabetes mellitus without complication, without long-term current use of insulin (HCC)    Tobacco dependence    History of tobacco use disorder    Hypokalemia    Leukocytosis    Hyperglycemia    Colon perforation (HCC)    Acute left flank pain    Bowel perforation (HCC)    Acute hypoxic respiratory failure (HCC)     Postop day 7 Lopes's procedure  Postprocedure day 1 IR drain placement for drainage of intra-abdominal abscess      Plan:  The patient is stable for discharge to home today from surgical standpoint.  Transition to oral antibiotics for discharge.  Analgesics as needed.  Discharge to home with Ajden drain.  Follow-up with [facility] general surgery within 1 week for surgical drain removal.  IR drain management as per IR.    Mirna San PA-C  2/24/2024  12:32 PM      Patient seen and examined, I agree with the documentation above with the following addendum.    No acute events overnight. Feels improved after drain placement. Had a low grade temp this am. Ostomy is functional. Tolerating diet.   Physical exam:  General: NAD, Aodx3   Abdomen: soft, non tender, non distended,no rebound tenderness or guarding, incisions are clean dry and intact, ostomy is functional and well perfused. The IR drain exiting the left lower quadrant is purulent, the surgical drain exiting the right lower quadrant is serous with minimal output.       Assesment & Plan:  39 year old male, 7 Days Post-Op from Lopes's procedure   Developed intraabdominal abscess that was drained by IR  Patient is tolerating diet, having ostomy output  He is clear for discharge today with 1 week follow up with dr. Hernandez or SERA Michel MD  [facility] General Surgery

## 2024-02-24 NOTE — DISCHARGE SUMMARY
[facility]     Trino Reddy Patient Status:  Inpatient    1985 MRN [id number]   Location [facility] 3NW-A Attending No att. providers found   Hosp Day # 7 PCP Johnnie Mancilla MD     Date of Admission: 2024  Date of Discharge:  2024     Discharge Disposition: Home or Self Care    Discharge Diagnosis:  # Septic shock, resolved     # Colitis with perforation    - s/ open low anterior resection with creation of end colostomy and drainage of retroperitoneal abscess    - Should f/u with GI as outpt once healed to r/o IBD as etiology especially given prior episode of illeitis as well.    - PRN Oxycodone     #Intraabdominal abscess s/p drain      # DM2   - poor chronic control A1c 10%  - LDSSI + Accucheck QID     # Hypokalemia - replete per protocol     #Acute blood loss anemia related to surgical loss  -no signis/sx of active bleeding, Hb stable, continue to monitor      #Acute Thrombocytopenia    History of Present Illness: Trino Reddy is a 39 year old male with PMHx DM2 who presents for abdominal/flank pain.      Patient notes that for the past few days he has been having intermittent back and flank pain which was mild in nature and he continued working through it.  However today left sided flank pain got progressively worse, increasing severity with associated nausea, vomiting that was nonbloody and nonbilious, chills, with migration to the left-sided abdomen now.  Notes that he was having associated constipation as well.  Denies any prior similar episodes of this.  Given worsening pain presented to the ER for further evaluation.    Brief Synopsis:     CT= colonic wall thickening, acute colitis with perforation   : Worsening intermittent back and flank pain, Perf Bowel > OPEN LOW ANTERIOR COLON RESECTION, CREATION OF END COLOSTOMY, DRAINAGE OF RETROPERITONEAL ABSCESS -> ICU   : Pain control, A line and central line out, Tx orders.   : 2Lnc. NGT  clamped. Ngt removed. Start clear liquids.   2/21 temp 101.9 pan culture.  Midline, cxr: Persistent confluent opacification of the left lung base -> To floor   2/22: Wound care came to re-educate. Follow WBC--possible DC tomorrow.   2/23: WBC rising (16.9). CXRAY: Stable small left pleural effusion and associated atelectasis/consolidation. Urine cx sent. CT abd/pelvis: multiloculated abscess. Small bilateral pleural effusions with near complete atelectasis of the left lower lobe. IR drain placed.   2/24: afebrile, WBC stable, discharged home in stable condition, follow up with general surgery as outpatient for drain removals       Lace+ Score: 35  59-90 High Risk  29-58 Medium Risk  0-28   Low Risk  Patient was referred to the [facility].    TCM Follow-Up Recommendation:  LACE 29-58: Moderate Risk of readmission after discharge from the hospital.      Procedures during hospitalization:   See above    Incidental or significant findings and recommendations (brief descriptions):  N/a    Lab/Test results pending at Discharge:   N/a    Consultants:  General Surgery, Critical care     Discharge Medication List:     Discharge Medications        START taking these medications        Instructions Prescription details   amoxicillin clavulanate 875-125 MG Tabs  Commonly known as: Augmentin      Take 1 tablet by mouth 2 (two) times daily for 7 days.   Stop taking on: March 1, 2024  Quantity: 14 tablet  Refills: 0     glipiZIDE 5 MG Tabs  Commonly known as: Glucotrol      Take 1 tablet (5 mg total) by mouth every morning before breakfast.   Quantity: 30 tablet  Refills: 0     metRONIDAZOLE 500 MG Tabs  Commonly known as: Flagyl      Take 1 tablet (500 mg total) by mouth 3 (three) times daily for 7 days.   Stop taking on: March 2, 2024  Quantity: 21 tablet  Refills: 0     multivitamin Chew      Chew 1 tablet by mouth daily.   Quantity: 90 tablet  Refills: 0     oxyCODONE 5 MG Tabs      Take 1 tablet (5 mg  total) by mouth every 6 (six) hours as needed for Pain.   Quantity: 15 tablet  Refills: 0            CHANGE how you take these medications        Instructions Prescription details   metFORMIN HCl 1000 MG Tabs  Commonly known as: GLUCOPHAGE  What changed: when to take this      Take 1 tablet (1,000 mg total) by mouth 2 (two) times daily with meals.   Quantity: 60 tablet  Refills: 0            CONTINUE taking these medications        Instructions Prescription details   Omeprazole 40 MG Cpdr      Take 1 capsule (40 mg total) by mouth daily.   Refills: 0     traZODone 50 MG Tabs  Commonly known as: Desyrel      Take 1 tablet (50 mg total) by mouth nightly.   Quantity: 90 tablet  Refills: 0            STOP taking these medications      Aleve 220 MG Caps  Generic drug: Naproxen Sodium                  Where to Get Your Medications        These medications were sent to [id number] - [place], [place] - [address] [facility] [address], [phone], [phone] [address], [facility] [address]      Phone: [phone]   amoxicillin clavulanate 875-125 MG Tabs  glipiZIDE 5 MG Tabs  metFORMIN HCl 1000 MG Tabs  metRONIDAZOLE 500 MG Tabs  oxyCODONE 5 MG Tabs       Please  your prescriptions at the location directed by your doctor or nurse    Bring a paper prescription for each of these medications  multivitamin Chew         [place]     Follow-up appointment:   Johnnie Mancilla MD  [address]  [address]  [place] [address] [phone]    Schedule an appointment as soon as possible for a visit in 1 week(s)      [facility]  [address] [place] [address] [phone]    Schedule an appointment as soon as possible for a visit in 1 week(s)  for drain removal    Jaylan Hernandez MD  [address] [facility] [address] [address]  [facility] [address] [phone]    Schedule an appointment as soon as possible for a visit in 1 month(s)      Gene Colon MD  [address] [place] [address]  [phone]    Schedule an appointment as soon as possible for a visit in 2 week(s)      Appointments for Next 30 Days 2024 - 3/25/2024        Date Arrival Time Visit Type Length Department Provider     2024  8:00 AM  FOLLOW UP VISIT [2828] 20 min [facility], [address] [place] [address], Johnnie Curiel MD    Patient Instructions:         Location Instructions:     Masks are optional for all patients and visitors, unless otherwise indicated.                      Vital signs:  Temp:  [97.8 °F (36.6 °C)-100.1 °F (37.8 °C)] 98 °F (36.7 °C)  Pulse:  [] 93  Resp:  [16-18] 18  BP: (142-152)/(73-84) 144/79  SpO2:  [94 %-100 %] 97 %    Physical Exam:    General: No acute distress   Lungs: clear to auscultation  Cardiovascular: S1, S2  Abdomen: Soft      -----------------------------------------------------------------------------------------------  PATIENT DISCHARGE INSTRUCTIONS: See electronic chart    Ramy Morfin DO    Total time spent on discharge plannin minutes     The  [employer] Cures Act makes medical notes like these available to patients in the interest of transparency. Please be advised this is a medical document. Medical documents are intended to carry relevant information, facts as evident, and the clinical opinion of the practitioner. The medical note is intended as peer to peer communication and may appear blunt or direct. It is written in medical language and may contain abbreviations or verbiage that are unfamiliar.

## 2024-02-24 NOTE — PLAN OF CARE
Pt tolering soft diet. Abd soft, active bs, stool and gas noted tin colostomy. Midline incision with staples cdi. Brandon drain with small amount of ss drainage. IR drain with with small amount of think pink drainage, drain flush, will have pt perform return demonstration before discharge. Pt is ambulating halls. Poc updated, pt verbalized understanding.

## 2024-02-24 NOTE — PLAN OF CARE
Written and verbal discharge instructions given to patient and parents, they verbalize understanding.  Prescription given for sent to pharmacy.  Patient left floor in stable condition via wc, accompanied by staff.

## 2024-02-24 NOTE — PROGRESS NOTES
A&Ox4. VSS. RA.   Telemetry: NSR  GI: Abdomen soft, nondistended. Passing gas and stool in ostomy  Denies nausea.  : Voids.  Pain controlled with PRN pain medications  Up with standby assist.  Drains: MELIA drain on R side, IR drain of L side, colostomy LUQ  Incisions: midline with staples kira  Diet: carb control  IVF running per order.  All appropriate safety measures in place. Patient updated on plan of care. All questions and concerns addressed.

## 2024-02-24 NOTE — PLAN OF CARE
Problem: Diabetes/Glucose Control  Goal: Glucose maintained within prescribed range  Description: INTERVENTIONS:  - Monitor Blood Glucose as ordered  - Assess for signs and symptoms of hyperglycemia and hypoglycemia  - Administer ordered medications to maintain glucose within target range  - Assess barriers to adequate nutritional intake and initiate nutrition consult as needed  - Instruct patient on self management of diabetes  Outcome: Progressing     Problem: Patient/Family Goals  Goal: Patient/Family Long Term Goal  Description: Patient's Long Term Goal: Discharge.    Interventions:  - Surgery service attending  - Diet advancement per surgery  - Wound care nursing - ostomy evaluation and teaching  - MELIA drain care  - Ambulation, IS  - Await cultures  - See additional Care Plan goals for specific interventions  Outcome: Progressing  Goal: Patient/Family Short Term Goal  Description: Patient's Short Term Goal: Pain management    Interventions:   - Non-pharm pain management for acute, chronic pain  - Wean off opiate medications when possible, adhere to scheduled pain management  - Ambulation as tolerated  - Repositioning  - See additional Care Plan goals for specific interventions  Outcome: Progressing     Problem: PAIN - ADULT  Goal: Verbalizes/displays adequate comfort level or patient's stated pain goal  Description: INTERVENTIONS:  - Encourage pt to monitor pain and request assistance  - Assess pain using appropriate pain scale  - Administer analgesics based on type and severity of pain and evaluate response  - Implement non-pharmacological measures as appropriate and evaluate response  - Consider cultural and social influences on pain and pain management  - Manage/alleviate anxiety  - Utilize distraction and/or relaxation techniques  - Monitor for opioid side effects  - Notify MD/LIP if interventions unsuccessful or patient reports new pain  - Anticipate increased pain with activity and pre-medicate as  appropriate  Outcome: Progressing     Problem: CARDIOVASCULAR - ADULT  Goal: Maintains optimal cardiac output and hemodynamic stability  Description: INTERVENTIONS:  - Monitor vital signs, rhythm, and trends  - Monitor for bleeding, hypotension and signs of decreased cardiac output  - Evaluate effectiveness of vasoactive medications to optimize hemodynamic stability  - Monitor arterial and/or venous puncture sites for bleeding and/or hematoma  - Assess quality of pulses, skin color and temperature  - Assess for signs of decreased coronary artery perfusion - ex. Angina  - Evaluate fluid balance, assess for edema, trend weights  Outcome: Progressing  Goal: Absence of cardiac arrhythmias or at baseline  Description: INTERVENTIONS:  - Continuous cardiac monitoring, monitor vital signs, obtain 12 lead EKG if indicated  - Evaluate effectiveness of antiarrhythmic and heart rate control medications as ordered  - Initiate emergency measures for life threatening arrhythmias  - Monitor electrolytes and administer replacement therapy as ordered  Outcome: Progressing     Problem: RISK FOR INFECTION - ADULT  Goal: Absence of fever/infection during anticipated neutropenic period  Description: INTERVENTIONS  - Monitor WBC  - Administer growth factors as ordered  - Implement neutropenic guidelines  Outcome: Progressing     Problem: SAFETY ADULT - FALL  Goal: Free from fall injury  Description: INTERVENTIONS:  - Assess pt frequently for physical needs  - Identify cognitive and physical deficits and behaviors that affect risk of falls.  - Henderson fall precautions as indicated by assessment.  - Educate pt/family on patient safety including physical limitations  - Instruct pt to call for assistance with activity based on assessment  - Modify environment to reduce risk of injury  - Provide assistive devices as appropriate  - Consider OT/PT consult to assist with strengthening/mobility  - Encourage toileting schedule  Outcome:  Progressing     Problem: DISCHARGE PLANNING  Goal: Discharge to home or other facility with appropriate resources  Description: INTERVENTIONS:  - Identify barriers to discharge w/pt and caregiver  - Include patient/family/discharge partner in discharge planning  - Arrange for needed discharge resources and transportation as appropriate  - Identify discharge learning needs (meds, wound care, etc)  - Arrange for interpreters to assist at discharge as needed  - Consider post-discharge preferences of patient/family/discharge partner  - Complete POLST form as appropriate  - Assess patient's ability to be responsible for managing their own health  - Refer to Case Management Department for coordinating discharge planning if the patient needs post-hospital services based on physician/LIP order or complex needs related to functional status, cognitive ability or social support system  Outcome: Progressing

## 2024-02-26 ENCOUNTER — TELEPHONE (OUTPATIENT)
Dept: INTERNAL MEDICINE CLINIC | Facility: CLINIC | Age: 39
End: 2024-02-26

## 2024-02-26 ENCOUNTER — PATIENT OUTREACH (OUTPATIENT)
Dept: CASE MANAGEMENT | Age: 39
End: 2024-02-26

## 2024-02-26 DIAGNOSIS — Z02.9 ENCOUNTERS FOR ADMINISTRATIVE PURPOSE: ICD-10-CM

## 2024-02-26 DIAGNOSIS — K63.1 BOWEL PERFORATION (HCC): Primary | ICD-10-CM

## 2024-02-26 NOTE — PAYOR COMM NOTE
--------------  DISCHARGE REVIEW    Payor: [employer]NER MCDONALD  Subscriber #:  [id number]  Authorization Number: [id number]    Admit date: 24  Admit time:   8:55 PM  Discharge Date: 2024  2:52 PM     Admitting Physician: Hira Oliver MD  Attending Physician:  No att. providers found  Primary Care Physician: Johnnie Mancilla MD          Discharge Summary Notes        Discharge Summary signed by Ramy Morfin DO at 2024  4:47 PM       Author: Ramy Morfin DO Specialty: HOSPITALIST, Internal Medicine Author Type: Physician    Filed: 2024  4:47 PM Date of Service: 2024  4:44 PM Status: Signed    : Ramy Morfin DO (Physician)           [facility]     Trino Reddy Patient Status:  Inpatient    1985 MRN [id number]   Location [facility] 3NW-A Attending No att. providers found   Hosp Day # 7 PCP Johnnie Mancilla MD     Date of Admission: 2024  Date of Discharge:  2024     Discharge Disposition: Home or Self Care    Discharge Diagnosis:  # Septic shock, resolved     # Colitis with perforation    - s/ open low anterior resection with creation of end colostomy and drainage of retroperitoneal abscess    - Should f/u with GI as outpt once healed to r/o IBD as etiology especially given prior episode of illeitis as well.    - PRN Oxycodone     #Intraabdominal abscess s/p drain      # DM2   - poor chronic control A1c 10%  - LDSSI + Accucheck QID     # Hypokalemia - replete per protocol     #Acute blood loss anemia related to surgical loss  -no signis/sx of active bleeding, Hb stable, continue to monitor      #Acute Thrombocytopenia    History of Present Illness: Trino Reddy is a 39 year old male with PMHx DM2 who presents for abdominal/flank pain.      Patient notes that for the past few days he has been having intermittent back and flank pain which was mild in nature and he continued working through it.  However today left sided flank pain got  progressively worse, increasing severity with associated nausea, vomiting that was nonbloody and nonbilious, chills, with migration to the left-sided abdomen now.  Notes that he was having associated constipation as well.  Denies any prior similar episodes of this.  Given worsening pain presented to the ER for further evaluation.    Brief Synopsis:     CT= colonic wall thickening, acute colitis with perforation   2/17: Worsening intermittent back and flank pain, Perf Bowel > OPEN LOW ANTERIOR COLON RESECTION, CREATION OF END COLOSTOMY, DRAINAGE OF RETROPERITONEAL ABSCESS -> ICU   2/18: Pain control, A line and central line out, Tx orders.   2/20: 2Lnc. NGT clamped. Ngt removed. Start clear liquids.   2/21 temp 101.9 pan culture.  Midline, cxr: Persistent confluent opacification of the left lung base -> To floor   2/22: Wound care came to re-educate. Follow WBC--possible DC tomorrow.   2/23: WBC rising (16.9). CXRAY: Stable small left pleural effusion and associated atelectasis/consolidation. Urine cx sent. CT abd/pelvis: multiloculated abscess. Small bilateral pleural effusions with near complete atelectasis of the left lower lobe. IR drain placed.   2/24: afebrile, WBC stable, discharged home in stable condition, follow up with general surgery as outpatient for drain removals       Lace+ Score: 35  59-90 High Risk  29-58 Medium Risk  0-28   Low Risk  Patient [facility] to [facility].    TCM Follow-Up Recommendation:  LACE 29-58: Moderate Risk of readmission after discharge from the hospital.      Procedures during hospitalization:   See above    Incidental or significant findings and recommendations (brief descriptions):  N/a    Lab/Test results pending at Discharge:   N/a    Consultants:  General Surgery, Critical care     Discharge Medication List:     Discharge Medications        START taking these medications        Instructions Prescription details   amoxicillin clavulanate 484-300  MG Tabs  Commonly known as: Augmentin      Take 1 tablet by mouth 2 (two) times daily for 7 days.   Stop taking on: March 1, 2024  Quantity: 14 tablet  Refills: 0     glipiZIDE 5 MG Tabs  Commonly known as: Glucotrol      Take 1 tablet (5 mg total) by mouth every morning before breakfast.   Quantity: 30 tablet  Refills: 0     metRONIDAZOLE 500 MG Tabs  Commonly known as: Flagyl      Take 1 tablet (500 mg total) by mouth 3 (three) times daily for 7 days.   Stop taking on: March 2, 2024  Quantity: 21 tablet  Refills: 0     multivitamin Chew      Chew 1 tablet by mouth daily.   Quantity: 90 tablet  Refills: 0     oxyCODONE 5 MG Tabs      Take 1 tablet (5 mg total) by mouth every 6 (six) hours as needed for Pain.   Quantity: 15 tablet  Refills: 0            CHANGE how you take these medications        Instructions Prescription details   metFORMIN HCl 1000 MG Tabs  Commonly known as: GLUCOPHAGE  What changed: when to take this      Take 1 tablet (1,000 mg total) by mouth 2 (two) times daily with meals.   Quantity: 60 tablet  Refills: 0            CONTINUE taking these medications        Instructions Prescription details   Omeprazole 40 MG Cpdr      Take 1 capsule (40 mg total) by mouth daily.   Refills: 0     traZODone 50 MG Tabs  Commonly known as: Desyrel      Take 1 tablet (50 mg total) by mouth nightly.   Quantity: 90 tablet  Refills: 0            STOP taking these medications      Aleve 220 MG Caps  Generic drug: Naproxen Sodium                  Where to Get Your Medications        These medications were sent to [id number] - [place], [place] - [address] AT [facility] [address], [phone], [phone]  [address], [facility] [address]      Phone: [phone]   amoxicillin clavulanate 875-125 MG Tabs  glipiZIDE 5 MG Tabs  metFORMIN HCl 1000 MG Tabs  metRONIDAZOLE 500 MG Tabs  oxyCODONE 5 MG Tabs       Please  your prescriptions at the location directed by your doctor or nurse     Bring a paper prescription for each of these medications  multivitamin Chew         [place]     Follow-up appointment:   Johnnie Mancilla MD  [address]  [address]  [place] [id number]  [phone]    Schedule an appointment as soon as possible for a visit in 1 week(s)      [facility]  [address] [place] [id number] [phone]    Schedule an appointment as soon as possible for a visit in 1 week(s)  for drain removal    Jaylan Hernandez MD  [address]  [place] [id number] [phone]    Schedule an appointment as soon as possible for a visit in 1 month(s)      Gene Colon MD  [address] [facility]   [place] [address]  [phone]    Schedule an appointment as soon as possible for a visit in 2 week(s)      Appointments for Next 30 Days 2/24/2024 - 3/25/2024        Date Arrival Time Visit Type Length Department Provider     2/28/2024  8:00 AM  FOLLOW UP VISIT [2828] 20 min [facility] ([place] [place] [address] [place] [address] Johnnie Mancilla MD    Patient Instructions:         Location Instructions:     Masks are optional for all patients and visitors, unless otherwise indicated.                      Vital signs:  Temp:  [97.8 °F (36.6 °C)-100.1 °F (37.8 °C)] 98 °F (36.7 °C)  Pulse:  [] 93  Resp:  [16-18] 18  BP: (142-152)/(73-84) 144/79  SpO2:  [94 %-100 %] 97 %    Physical Exam:    General: No acute distress   Lungs: clear to auscultation  Cardiovascular: S1, S2  Abdomen: Soft      -----------------------------------------------------------------------------------------------  PATIENT DISCHARGE INSTRUCTIONS: See electronic chart    Ramy Morfin DO        Electronically signed by Ramy Morfin DO on 2/24/2024  4:47 PM

## 2024-02-26 NOTE — PROGRESS NOTES
Initial Post Discharge Follow Up   Discharge Date: 2/24/24  Contact Date: 2/26/2024    Consent Verification:  Assessment Completed With: Patient  HIPAA Verified?  Yes    Discharge Dx:   Colon perforation     General:   How have you been since your discharge from the hospital?  I'm doing okay, just have the soreness and swelling.   Do you have any pain since discharge?  Yes  Where: abdomen hips legs back.    Rating on pain scale 1-10, 10 being the worst pain you have ever experienced, what is current pain:  2-5/10  Alleviating factors: rest  Aggravating factors: activity  Is the pain manageable at home? Yes  How well was your pain managed while in the hospital?   On a scale of 1-5   1- Very Poor and 5- Very well   Very Well  When you were leaving the hospital were your discharge instructions reviewed with you? Yes  How well were your discharge instructions explained to you?   On a scale of 1-5   1- Very Poor and 5- Very well   Very Well  Do you have any questions about your discharge instructions?  No  Before leaving the hospital was your diagnoses explained to you? Yes  Do you have any questions about your diagnoses? No  Are you able to perform normal daily activities of living as you have prior to your hospital stay (dressing, bathing, ambulating to the bathroom, etc)? yes  (NCM) Was patient given a different diet per AVS? yes  If so, which diet?  Soft  Are there any barriers to following this diet? no    Medications:   Current Outpatient Medications   Medication Sig Dispense Refill    metRONIDAZOLE (FLAGYL) 500 MG Oral Tab Take 1 tablet (500 mg total) by mouth 3 (three) times daily for 7 days. 21 tablet 0    amoxicillin clavulanate 875-125 MG Oral Tab Take 1 tablet by mouth 2 (two) times daily for 7 days. 14 tablet 0    oxyCODONE 5 MG Oral Tab Take 1 tablet (5 mg total) by mouth every 6 (six) hours as needed for Pain. 15 tablet 0    multivitamin Oral Chew Tab Chew 1 tablet by mouth daily. 90 tablet 0    metFORMIN  HCl 1000 MG Oral Tab Take 1 tablet (1,000 mg total) by mouth 2 (two) times daily with meals. 60 tablet 0    glipiZIDE 5 MG Oral Tab Take 1 tablet (5 mg total) by mouth every morning before breakfast. 30 tablet 0    traZODone 50 MG Oral Tab Take 1 tablet (50 mg total) by mouth nightly. 90 tablet 0    Omeprazole 40 MG Oral Capsule Delayed Release Take 1 capsule (40 mg total) by mouth daily.       Were there any changes to your current medication(s) noted on the AVS? Yes  If so, were these medication changes discussed with you prior to leaving the hospital? Yes  If a new medication was prescribed:    Was the new medication's purpose & side effects reviewed? Yes  Do you have any questions about your new medication? No  Did you  your discharge medications when you left the hospital? Yes  Let's go over your medications together to make sure we are not missing anything. Medications Reviewed  Are there any reasons that keep you from taking your medication as prescribed? No  Are you having any concerns with constipation? No      Discharge medications reviewed/discussed/and reconciled against outpatient medications with patient.  Any changes or updates to medications sent to PCP.  Patient Acknowledged     Referrals/orders at D/C:  Referrals/orders placed at D/C? yes  What services:   Home health   (If HH was ordered) Has HH been set up?  No, [facility] is coming at any minute per pt.     DME ordered at D/C? No      Discharge orders, AVS reviewed and discussed with patient. Any changes or updates to orders sent to PCP.  Patient Acknowledged      SDOH:   Transportation Needs: No Transportation Needs (2/17/2024)    Transportation Needs     Lack of Transportation: No     Financial Resource Strain: Low Risk  (2/26/2024)    Financial Resource Strain     Difficulty of Paying Living Expenses: Not hard at all     Med Affordability: No     Follow up appointments:      Your appointments       Date & Time Appointment Department  (Center)    Feb 28, 2024  8:00 AM CST Follow Up Visit with Johnnie Mancilla MD [facility], [address] [place] [address] ([facility])              [facility], [address] [place] [address]  [facility]  [address]  [facility] [address]  [phone]            TCC  Was TCC ordered: Yes  Was TCC scheduled: No, Explain-pt has appt with PCP on 2/28      PCP (If no TCC appointment)  Does patient already have a PCP appointment scheduled? Yes  NCM Confirmed PCP office TCM appointment with patient      Specialist    Does the patient have any other follow up appointment(s) needing to be scheduled? Yes  If yes: NCM reviewed upcoming specialist appointment with patient: Yes  Does the patient need assistance scheduling appointment(s): No, pt states that he will call today to schedule with specialists.     Is there any reason as to why you cannot make your appointment(s)?  No     Needs post D/C:   Now that you are home, are there any needs or concerns you need addressed before your next visit with your PCP?  (DME, meds, questions, etc.): No    Interventions by NCM:   NCM reviewed discharge instructions and when to seek medical attention with the patient. He states that he is doing okay. NCM instructed on s/s of infection; he v/u. He states that the output from ostomy is normal and starting to form better. He denies having any fever, n/v, sob, lightheadedness, HA or any new or worsening symptoms. He has not checked his bs yet but will. Med review completed. He denied having any questions or concerns at this time.       CCM referral placed:    No    BOOK BY DATE: 3/9/24

## 2024-02-26 NOTE — TELEPHONE ENCOUNTER
Called patient to advise that Dr Mancilla would prefer an in person visit to listen to bowels sounds, check surgical incision site. Patient confirms understanding and will keep this week's appointment in person.

## 2024-02-26 NOTE — TELEPHONE ENCOUNTER
Patient was hospitalized for sepsis, perforated bowel, now has colostomy.  He currently has a 6 month follow up diabetes appointment on Wednesday.  Patient wondering if this can be changed to hospital follow up video visit?

## 2024-02-26 NOTE — TELEPHONE ENCOUNTER
Pt is scheduled for 6 month follow up on   Future Appointments   Date Time Provider Department Center   2/28/2024  8:00 AM Johnnie Mancilla MD [facility]      He had Emergency Surgery on 2/17/24 and was hospitalized.  Pt is wondering if this appt can be changed to VV it will also need to be a hospital follow up but only 20 min are allowed.

## 2024-02-27 ENCOUNTER — TELEPHONE (OUTPATIENT)
Facility: LOCATION | Age: 39
End: 2024-02-27

## 2024-02-27 NOTE — TELEPHONE ENCOUNTER
S/w patient to relay DOUGLAS Kamara PA-C's recommendations of supplementing the oxycodone with tylenol and ibuprofen and that further pain management will be discussed at tomorrow's appointment.  Verbalized understanding.     Future Appointments   Date Time Provider Department Center   2/28/2024  8:00 AM Johnnie Mancilla MD [facility]   2/28/2024 10:15 AM [id number]

## 2024-02-27 NOTE — TELEPHONE ENCOUNTER
Hello, the patient recently called stating he is experiencing numerous of symptoms. He is experiencing pain in his knees, swelling in his legs, and discomfort while laying down and getting up. The patient need a refill on the prescribed medication he stated oxycodone.     Medication : Oxycodone 5 mg Oral Tab - 15 tablets    Surgery : OPEN LOWER ANTERIOR RESECTION, CREATION OF END COLOSTMY, DRAINAGE OF RETROPERITONEAL ABSCESS 2/17/24    Call back # [phone]

## 2024-02-27 NOTE — TELEPHONE ENCOUNTER
S/w patient who reports continued pain post op and requesting a refill, only has two pills left. Per patient he was told not to supplement oxycodone with tylenol or ibuprofen.  Pt with complaints of pain and swelling in bilateral knees and legs that he noticed on Saturday after leaving the hospital but believes it was also there while in the hospital. Ostomy is producing. Denies fever, chills, drainage or redness ,nausea or vomiting.

## 2024-02-28 ENCOUNTER — OFFICE VISIT (OUTPATIENT)
Dept: INTERNAL MEDICINE CLINIC | Facility: CLINIC | Age: 39
End: 2024-02-28
Payer: COMMERCIAL

## 2024-02-28 ENCOUNTER — TELEPHONE (OUTPATIENT)
Dept: WOUND CARE | Facility: HOSPITAL | Age: 39
End: 2024-02-28

## 2024-02-28 ENCOUNTER — HOSPITAL ENCOUNTER (OUTPATIENT)
Dept: CT IMAGING | Facility: HOSPITAL | Age: 39
Discharge: HOME | End: 2024-02-28
Payer: COMMERCIAL

## 2024-02-28 ENCOUNTER — OFFICE VISIT (OUTPATIENT)
Facility: LOCATION | Age: 39
End: 2024-02-28
Payer: COMMERCIAL

## 2024-02-28 VITALS
SYSTOLIC BLOOD PRESSURE: 114 MMHG | HEIGHT: 74 IN | WEIGHT: 261.81 LBS | BODY MASS INDEX: 33.6 KG/M2 | RESPIRATION RATE: 14 BRPM | DIASTOLIC BLOOD PRESSURE: 62 MMHG | HEART RATE: 70 BPM | OXYGEN SATURATION: 97 %

## 2024-02-28 VITALS — TEMPERATURE: 98 F | HEART RATE: 96 BPM

## 2024-02-28 DIAGNOSIS — R80.9 TYPE 2 DIABETES MELLITUS WITH MICROALBUMINURIA, WITHOUT LONG-TERM CURRENT USE OF INSULIN (HCC): ICD-10-CM

## 2024-02-28 DIAGNOSIS — K52.9 ACUTE COLITIS: ICD-10-CM

## 2024-02-28 DIAGNOSIS — Z98.890 POST-OPERATIVE STATE: ICD-10-CM

## 2024-02-28 DIAGNOSIS — E11.29 TYPE 2 DIABETES MELLITUS WITH MICROALBUMINURIA, WITHOUT LONG-TERM CURRENT USE OF INSULIN (HCC): ICD-10-CM

## 2024-02-28 DIAGNOSIS — E87.6 HYPOKALEMIA: ICD-10-CM

## 2024-02-28 DIAGNOSIS — R65.21 SEPTIC SHOCK (HCC): Primary | ICD-10-CM

## 2024-02-28 DIAGNOSIS — D69.59 THROMBOCYTOPENIA DUE TO BLOOD LOSS: ICD-10-CM

## 2024-02-28 DIAGNOSIS — T81.43XA POSTPROCEDURAL INTRAABDOMINAL ABSCESS: ICD-10-CM

## 2024-02-28 DIAGNOSIS — T81.43XA POSTPROCEDURAL INTRAABDOMINAL ABSCESS: Primary | ICD-10-CM

## 2024-02-28 DIAGNOSIS — L03.312 CELLULITIS OF BACK EXCEPT BUTTOCK: ICD-10-CM

## 2024-02-28 DIAGNOSIS — K63.1 BOWEL PERFORATION (HCC): ICD-10-CM

## 2024-02-28 DIAGNOSIS — A41.9 SEPTIC SHOCK (HCC): Primary | ICD-10-CM

## 2024-02-28 PROCEDURE — 99024 POSTOP FOLLOW-UP VISIT: CPT

## 2024-02-28 PROCEDURE — 74177 CT ABD & PELVIS W/CONTRAST: CPT

## 2024-02-28 PROCEDURE — 99496 TRANSJ CARE MGMT HIGH F2F 7D: CPT | Performed by: INTERNAL MEDICINE

## 2024-02-28 RX ORDER — NALOXONE HYDROCHLORIDE 4 MG/.1ML
4 SPRAY NASAL AS NEEDED
Qty: 1 KIT | Refills: 0 | Status: SHIPPED | OUTPATIENT
Start: 2024-02-28

## 2024-02-28 RX ORDER — MELOXICAM 15 MG/1
15 TABLET ORAL DAILY
Qty: 30 TABLET | Refills: 0 | Status: SHIPPED | OUTPATIENT
Start: 2024-02-28

## 2024-02-28 RX ORDER — OXYCODONE HYDROCHLORIDE 5 MG/1
5 TABLET ORAL EVERY 6 HOURS PRN
Qty: 20 TABLET | Refills: 0 | Status: SHIPPED | OUTPATIENT
Start: 2024-02-28
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
## 2024-02-28 NOTE — TELEPHONE ENCOUNTER
Called patient to follow up and see how he is doing with his new ostomy. Patient states that he has no concerns and is doing well. He reports that he has home health helping him. Instructed patient to call clinic with questions or concerns.

## 2024-02-28 NOTE — PROGRESS NOTES
Subjective:   Trino Reddy is a 39 year old male who presents for hospital follow up.   [facility]  Admission Date: 2/17/24   Discharge Date: 2/24/24  Hospital Discharge Diagnosis: Septic shock, colitis with perforation, and intra-abdominal abscess    Interactive contact within 2 business days post discharge first initiated on Date: 2/26/2024    During the visit, the following was completed:  Obtained and reviewed discharge summary, continuity of care documents, and Hospitalist notes  Reviewed Labs (CBC, CMP)    HPI:     The patient experienced a 2 to 3-day duration of lower back and flank pain that progressively worsened and became associated with nausea and vomiting, with chills, for which he presented to the emergency department.  CT abdomen revealed colonic wall thickening consistent with acute colitis, and bowel perforation for which he underwent an emergent open lower anterior colon resection, with creation of end colostomy, and drainage of retroperitoneal abscess.  He underwent ICU monitoring for septic shock and IV pain management.  Repeat CT abdomen and pelvis on 2/23 revealed multiloculated abscess for which the IR service was consulted and 2 MELIA drains were placed.  He was ultimately discharged home in stable clinical condition with close advised follow-up on 2/24.  He has since been monitoring his pain with oxycodone.  He reports minimal drainage in the 2 abdominal MELIA drains.  His pain is overall under variable control, with left hip pain being the most significant of his symptoms.  No fevers or chills. No cough or SOB. No significant nasal congestion. No dysuria. Loose and dark stool in colostomy. No chest pain. No LE edema.     History/Other:   Current Medications:  Medication Reconciliation:  I am aware of an inpatient discharge within the last 30 days.  The discharge medication list has been reconciled with the patient's current medication list  and reviewed by me.  See medication list for additions of new medication, and changes to current doses of medications and discontinued medications.  Outpatient Medications Marked as Taking for the 2/28/24 encounter (Office Visit) with Johnnie Mancilla MD   Medication Sig    metRONIDAZOLE (FLAGYL) 500 MG Oral Tab Take 1 tablet (500 mg total) by mouth 3 (three) times daily for 7 days.    amoxicillin clavulanate 875-125 MG Oral Tab Take 1 tablet by mouth 2 (two) times daily for 7 days.    oxyCODONE 5 MG Oral Tab Take 1 tablet (5 mg total) by mouth every 6 (six) hours as needed for Pain.    multivitamin Oral Chew Tab Chew 1 tablet by mouth daily.    metFORMIN HCl 1000 MG Oral Tab Take 1 tablet (1,000 mg total) by mouth 2 (two) times daily with meals.    glipiZIDE 5 MG Oral Tab Take 1 tablet (5 mg total) by mouth every morning before breakfast.    traZODone 50 MG Oral Tab Take 1 tablet (50 mg total) by mouth nightly.    Omeprazole 40 MG Oral Capsule Delayed Release Take 1 capsule (40 mg total) by mouth daily.       Review of Systems:  GENERAL: weight stable, energy stable, no sweating  SKIN: denies any unusual skin lesions  EYES: denies blurred vision or double vision  HEENT: denies nasal congestion, sinus pain or ST  LUNGS: denies shortness of breath with exertion  CARDIOVASCULAR: denies chest pain on exertion or palpitations  GI: denies abdominal pain, denies heartburn, denies diarrhea  MUSCULOSKELETAL: denies pain, normal range of motion of extremities  NEURO: denies headaches, denies dizziness, denies weakness  PSYCHE: denies depression or anxiety  HEMATOLOGIC: denies hx of anemia, or bruising, denies bleeding  ENDOCRINE: denies thyroid history  ALL/ASTHMA: denies hx of allergy or asthma    Objective:   No LMP for male patient.  Estimated body mass index is 33.61 kg/m² as calculated from the following:    Height as of this encounter: 6' 2\" (1.88 m).    Weight as of this encounter: 261 lb 12.8 oz (118.8 kg).   /62    Pulse 70   Resp 14   Ht 6' 2\" (1.88 m)   Wt 261 lb 12.8 oz (118.8 kg)   SpO2 97%   BMI 33.61 kg/m²    GENERAL: well developed, well nourished, in no apparent distress  SKIN: no rashes, no suspicious lesions  HEENT: atraumatic, normocephalic, ears and throat are clear  EYES: bilateral conjunctiva are clear  NECK: supple, no adenopathy, no bruits  CHEST: no chest tenderness  LUNGS: clear to auscultation  CARDIO: RRR without murmur  GI: soft and nondistended. No significant tenderness. Incisions are CDI. MELIA drains x 2 with minimal serosanguinous fluid. Colostomy with dark green soft stool.  MUSCULOSKELETAL: back is not tender, FROM of the extremities  EXTREMITIES: no cyanosis, clubbing or edema  Assessment & Plan:   There are no diagnoses linked to this encounter.    Septic shock:  Resolved  Secondary to colitis with perforation and intra-abdominal abscess  Post open low anterior resection with creation of end colostomy and drainage of retroperitoneal abscess  MELIA drain x 2 with serosanguineous fluid  Colostomy with dark green stool  No sign of postoperative infection  Scheduled for follow-up with general surgery service today    Diabetes mellitus type 2:  Uncontrolled, with hemoglobin A1c increased from 7.0% to 10%  Not currently monitoring blood glucose levels and taking metformin as monotherapy  Advised to monitor fasting blood glucose level tomorrow and on 3/1 and report these to readings on 3/1 to determine next steps in medical management.  My concern is that he is consuming very few calories and is at risk of hypoglycemia.    Follow-up laboratory evaluation for acute thrombocytopenia and hypokalemia ordered    Return in 4 weeks (on 3/27/2024).

## 2024-02-29 ENCOUNTER — TELEPHONE (OUTPATIENT)
Facility: LOCATION | Age: 39
End: 2024-02-29

## 2024-02-29 ENCOUNTER — TELEPHONE (OUTPATIENT)
Dept: CT IMAGING | Facility: HOSPITAL | Age: 39
End: 2024-02-29

## 2024-02-29 RX ORDER — SODIUM CHLORIDE 9 MG/ML
INJECTION, SOLUTION INTRAVENOUS CONTINUOUS
Status: CANCELLED | OUTPATIENT
Start: 2024-02-29

## 2024-02-29 RX ORDER — NALOXONE HYDROCHLORIDE 0.4 MG/ML
80 INJECTION, SOLUTION INTRAMUSCULAR; INTRAVENOUS; SUBCUTANEOUS AS NEEDED
Status: CANCELLED | OUTPATIENT
Start: 2024-02-29

## 2024-02-29 RX ORDER — FLUMAZENIL 0.1 MG/ML
0.2 INJECTION INTRAVENOUS AS NEEDED
Status: CANCELLED | OUTPATIENT
Start: 2024-02-29

## 2024-02-29 RX ORDER — MIDAZOLAM HYDROCHLORIDE 1 MG/ML
1 INJECTION INTRAMUSCULAR; INTRAVENOUS EVERY 5 MIN PRN
Status: CANCELLED | OUTPATIENT
Start: 2024-03-01 | End: 2024-03-01

## 2024-02-29 NOTE — TELEPHONE ENCOUNTER
I contacted patient to ask him about his existing drain placed by IR on 2/23. Pt states he's had little or no output from drain since discharge and had no instruction to do anything but empty daily. I reviewed with him that the IR Dr Quiroga wanted to have him come for a drain check and that he may need a second drain based on the imaging he had yesterday. Pt verbalized understanding and was unable to come today. I provided him with instructions for both the abscessogram as well as with the possibility of a second drain, he should be prepped by staying NPO after midnight, have a  to get him home and stay overnight due to sedation potential. He was also made aware that he would be staying with us for a recovery period if a new drain was needed. Pt verbalized understanding and will check in tomorrow by 1030 for lab/procedure.

## 2024-02-29 NOTE — TELEPHONE ENCOUNTER
Fax received from [employer], denying coverage of Oxycodone 5mg, #20 tabs, prescribed at OV 2/28/24.    Spoke with Jbo at [facility] pharmacy. He states insurance will cover max 4 days, and will fill Rx for 16 tabs for patient.

## 2024-03-01 ENCOUNTER — APPOINTMENT (OUTPATIENT)
Dept: LAB | Facility: HOSPITAL | Age: 39
End: 2024-03-01
Attending: RADIOLOGY
Payer: COMMERCIAL

## 2024-03-01 ENCOUNTER — NURSE ONLY (OUTPATIENT)
Dept: LAB | Facility: HOSPITAL | Age: 39
End: 2024-03-01
Attending: RADIOLOGY
Payer: COMMERCIAL

## 2024-03-01 ENCOUNTER — HOSPITAL ENCOUNTER (OUTPATIENT)
Dept: CT IMAGING | Facility: HOSPITAL | Age: 39
Discharge: HOME | End: 2024-03-01
Attending: RADIOLOGY
Payer: COMMERCIAL

## 2024-03-01 VITALS
RESPIRATION RATE: 16 BRPM | DIASTOLIC BLOOD PRESSURE: 76 MMHG | SYSTOLIC BLOOD PRESSURE: 151 MMHG | TEMPERATURE: 98 F | OXYGEN SATURATION: 99 % | HEART RATE: 91 BPM

## 2024-03-01 DIAGNOSIS — K68.19 RETROPERITONEAL ABSCESS (HCC): ICD-10-CM

## 2024-03-01 DIAGNOSIS — K68.19 RETROPERITONEAL ABSCESS (HCC): Primary | ICD-10-CM

## 2024-03-01 LAB
GLUCOSE BLD-MCNC: 134 MG/DL (ref 70–99)
INR BLD: 1.04 (ref 0.8–1.2)
PROTHROMBIN TIME: 13.6 SECONDS (ref 11.6–14.8)

## 2024-03-01 PROCEDURE — 87077 CULTURE AEROBIC IDENTIFY: CPT | Performed by: SURGERY

## 2024-03-01 PROCEDURE — 87075 CULTR BACTERIA EXCEPT BLOOD: CPT | Performed by: SURGERY

## 2024-03-01 PROCEDURE — 87070 CULTURE OTHR SPECIMN AEROBIC: CPT | Performed by: SURGERY

## 2024-03-01 PROCEDURE — 87205 SMEAR GRAM STAIN: CPT | Performed by: SURGERY

## 2024-03-01 PROCEDURE — 85610 PROTHROMBIN TIME: CPT

## 2024-03-01 PROCEDURE — 87186 SC STD MICRODIL/AGAR DIL: CPT | Performed by: SURGERY

## 2024-03-01 PROCEDURE — 99152 MOD SED SAME PHYS/QHP 5/>YRS: CPT | Performed by: RADIOLOGY

## 2024-03-01 PROCEDURE — 49406 IMAGE CATH FLUID PERI/RETRO: CPT | Performed by: RADIOLOGY

## 2024-03-01 PROCEDURE — 99153 MOD SED SAME PHYS/QHP EA: CPT | Performed by: RADIOLOGY

## 2024-03-01 PROCEDURE — 82962 GLUCOSE BLOOD TEST: CPT

## 2024-03-01 PROCEDURE — 36415 COLL VENOUS BLD VENIPUNCTURE: CPT

## 2024-03-01 RX ORDER — MIDAZOLAM HYDROCHLORIDE 1 MG/ML
1 INJECTION INTRAMUSCULAR; INTRAVENOUS EVERY 5 MIN PRN
Status: ACTIVE | OUTPATIENT
Start: 2024-03-01 | End: 2024-03-01

## 2024-03-01 RX ORDER — NALOXONE HYDROCHLORIDE 0.4 MG/ML
80 INJECTION, SOLUTION INTRAMUSCULAR; INTRAVENOUS; SUBCUTANEOUS AS NEEDED
Status: DISCONTINUED | OUTPATIENT
Start: 2024-03-01 | End: 2024-03-03

## 2024-03-01 RX ORDER — OXYCODONE HYDROCHLORIDE 5 MG/1
5 TABLET ORAL EVERY 6 HOURS PRN
Status: DISCONTINUED | OUTPATIENT
Start: 2024-03-01 | End: 2024-03-03

## 2024-03-01 RX ORDER — FLUMAZENIL 0.1 MG/ML
0.2 INJECTION INTRAVENOUS AS NEEDED
Status: DISCONTINUED | OUTPATIENT
Start: 2024-03-01 | End: 2024-03-03

## 2024-03-01 RX ORDER — OXYCODONE HYDROCHLORIDE 5 MG/1
TABLET ORAL
Status: COMPLETED
Start: 2024-03-01 | End: 2024-03-01

## 2024-03-01 RX ORDER — MIDAZOLAM HYDROCHLORIDE 1 MG/ML
INJECTION INTRAMUSCULAR; INTRAVENOUS
Status: COMPLETED
Start: 2024-03-01 | End: 2024-03-01

## 2024-03-01 RX ORDER — SODIUM CHLORIDE 9 MG/ML
INJECTION, SOLUTION INTRAVENOUS CONTINUOUS
Status: DISCONTINUED | OUTPATIENT
Start: 2024-03-01 | End: 2024-03-03

## 2024-03-01 RX ADMIN — MIDAZOLAM HYDROCHLORIDE 1 MG: 1 INJECTION INTRAMUSCULAR; INTRAVENOUS at 13:44:00

## 2024-03-01 RX ADMIN — SODIUM CHLORIDE: 9 INJECTION, SOLUTION INTRAVENOUS at 13:35:00

## 2024-03-01 RX ADMIN — MIDAZOLAM HYDROCHLORIDE 1 MG: 1 INJECTION INTRAMUSCULAR; INTRAVENOUS at 13:50:00

## 2024-03-01 RX ADMIN — MIDAZOLAM HYDROCHLORIDE 1 MG: 1 INJECTION INTRAMUSCULAR; INTRAVENOUS at 14:03:00

## 2024-03-01 RX ADMIN — OXYCODONE HYDROCHLORIDE 5 MG: 5 TABLET ORAL at 15:21:00

## 2024-03-01 NOTE — PROCEDURES
[facility]    Trino Reddy Patient Status:  Outpatient    1985 MRN [id number]   Location [facility] CT Attending Nestor Quiroga MD   Hosp Day # 0 PCP Johnnie Mancilla MD         Brief Procedure Report    Pre-Operative Diagnosis: Abdominal left psoas abscess requiring drainage.    Post-Operative Diagnosis: Same as above.    Procedure Performed: CT guided left psoas abscess drain placement    Anesthesia: 1% lidocaine. Moderate sedation    EBL: 1cc    Complications: None    Summary of Case: 7cc of purulent fluid aspirated from the left psoas abdominal abscess.  10.2 Fr. All Purpose Drainage Catheter placed into abscess cavity.  Catheter left to suction bulb drain. Fluid was sent for routine studies as directed by the ordering physician. Patient tolerated procedure well without immediate complication. Full report to follow in PACS.    Adriel Wilson

## 2024-03-01 NOTE — PROCEDURES
[facility]  Pre-Procedure Note    Name: Trino Reddy  MRN#: [id number]  : 1985    Procedure:  CT guided abscess drain placement    Indication: Abdominal Abscesses    Allergies:    No Known Allergies    Pertinent Medications:    Is patient on any Aspirin, Coumadin, or any other Anticoagulations/Antiplatelet medications?  no      Mental Status:  Alert and Oriented      Health Status: Acceptable for Procedure    Impression and Plans:    An existing abscess drain is in place however has pulled back into the more lateral retroperitoneal component of the bilobed abscess as per the CT from 24.  Will likely need a second drain placed into the left psoas abscess portion given that the original drain pulled out of the psoas component on its own.     I have reviewed the above information prior to procedure.    I have discussed the risks and benefits and alternatives with the patient.  The patient understands and agrees to proceed with plan of care.    Adriel Wilson MD

## 2024-03-01 NOTE — IMAGING NOTE
Pt had an abscessogram first, it was determined by Dr. Wilson to place a abdomen drain.  IV access to left hand, 22 G saline lock. 0.9 NS IV initiated to maintain patency.  Informed consent obtained by Dr Valdez. Positioned pt on scanner. Obtained biopsy. Specimen sent to Pathology.    Presently denies pain. Tolerated procedure well. Vital signs stable on room air.  SBAR report given to ALAN Orr. Transported pt to Rm 2249 accompanied by Richard PHILLIPS.

## 2024-03-01 NOTE — DISCHARGE INSTRUCTIONS
Flush both drains daily with 10ml saline and then manually aspirate. (Pull back 10ml)    Home Care Instructions    Arrange for a responsible adult to stay with you  DO NOT drive for 24 hours  DO NOT take public transportation without the presence of responsible adult for 24 hours  Try to rest the day of the procedure  No strenuous activity (heavy lifting) for 48-72 hours)  DO NOT drink alcoholic beverages or take medication not specifically prescribed for 24 hours  Start taking sips of fluids and if tolerated then you can eat small meals the rest of the day  If you have any concerns, PLEASE DO NOT HESITATE to call any of these departments.    Radiology - [phone]  Emergency Room - [phone]  Your own physician

## 2024-03-05 ENCOUNTER — TELEPHONE (OUTPATIENT)
Facility: LOCATION | Age: 39
End: 2024-03-05

## 2024-03-05 ENCOUNTER — OFFICE VISIT (OUTPATIENT)
Facility: LOCATION | Age: 39
End: 2024-03-05
Payer: COMMERCIAL

## 2024-03-05 ENCOUNTER — TELEPHONE (OUTPATIENT)
Dept: INTERNAL MEDICINE CLINIC | Facility: CLINIC | Age: 39
End: 2024-03-05

## 2024-03-05 VITALS — HEART RATE: 120 BPM | TEMPERATURE: 98 F

## 2024-03-05 DIAGNOSIS — Z93.3 STATUS POST HARTMANN PROCEDURE (HCC): Primary | ICD-10-CM

## 2024-03-05 DIAGNOSIS — R53.81 PHYSICAL DECONDITIONING: ICD-10-CM

## 2024-03-05 PROCEDURE — 99024 POSTOP FOLLOW-UP VISIT: CPT

## 2024-03-05 RX ORDER — CIPROFLOXACIN 500 MG/1
500 TABLET, FILM COATED ORAL 2 TIMES DAILY
Qty: 14 TABLET | Refills: 0 | Status: SHIPPED | OUTPATIENT
Start: 2024-03-05 | End: 2024-03-12

## 2024-03-05 NOTE — TELEPHONE ENCOUNTER
Paperwork was received from [email or web address]  order#[id number]. It was placed on Dr Mancilla's desk for review and signature.
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
## 2024-03-07 ENCOUNTER — TELEPHONE (OUTPATIENT)
Dept: INTERNAL MEDICINE CLINIC | Facility: CLINIC | Age: 39
End: 2024-03-07

## 2024-03-07 NOTE — TELEPHONE ENCOUNTER
Form received from [employer] for ostomy supplies.     AD, would you sign or defer to gen surg?     Triage- form in bin on wall

## 2024-03-07 NOTE — TELEPHONE ENCOUNTER
Signed paperwork was faxed to [employer]  fax#[phone]. Confirmation of receipt was received and paperwork was sent to [employer].

## 2024-03-08 ENCOUNTER — OFFICE VISIT (OUTPATIENT)
Dept: WOUND CARE | Facility: HOSPITAL | Age: 39
End: 2024-03-08
Payer: COMMERCIAL

## 2024-03-08 DIAGNOSIS — Z71.89 ENCOUNTER FOR OSTOMY CARE EDUCATION: Primary | ICD-10-CM

## 2024-03-08 PROCEDURE — 99214 OFFICE O/P EST MOD 30 MIN: CPT
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
## 2024-03-09 NOTE — TELEPHONE ENCOUNTER
Disab forms rcvd from the [employer] with valid SANG in the forms dept for Dr Hernandez. Kesha. Formds placed in Archive

## 2024-03-11 ENCOUNTER — TELEPHONE (OUTPATIENT)
Dept: INTERNAL MEDICINE CLINIC | Facility: CLINIC | Age: 39
End: 2024-03-11

## 2024-03-11 NOTE — TELEPHONE ENCOUNTER
Type of Leave: Continuous  Reason for Leave: sx on 02/17/24  Start date of leave: 02/17/24 to pending re-eval   How much time needed?: pending re-eval  Forms Due Date: asap  Was Fee and Turnaround info Given?:yes

## 2024-03-11 NOTE — TELEPHONE ENCOUNTER
Pt called to confirm Disab forms received, located in ARCHIVE per below msg.  Pt seeking disab due bowel resection/colostomy starting 2/19/24 pending re-eval on 3/18/24.

## 2024-03-13 ENCOUNTER — TELEPHONE (OUTPATIENT)
Facility: LOCATION | Age: 39
End: 2024-03-13

## 2024-03-14 ENCOUNTER — APPOINTMENT (OUTPATIENT)
Dept: WOUND CARE | Facility: HOSPITAL | Age: 39
End: 2024-03-14
Payer: COMMERCIAL

## 2024-03-14 ENCOUNTER — TELEPHONE (OUTPATIENT)
Dept: INTERNAL MEDICINE CLINIC | Facility: CLINIC | Age: 39
End: 2024-03-14

## 2024-03-14 DIAGNOSIS — Z93.3 COLOSTOMY IN PLACE (HCC): ICD-10-CM

## 2024-03-14 DIAGNOSIS — K63.1 COLON PERFORATION (HCC): ICD-10-CM

## 2024-03-14 DIAGNOSIS — K63.1 BOWEL PERFORATION (HCC): Primary | ICD-10-CM

## 2024-03-14 NOTE — TELEPHONE ENCOUNTER
Paperwork was received from [employer] for DME-pouch drain. It was placed on the triage bin for DME.

## 2024-03-18 ENCOUNTER — OFFICE VISIT (OUTPATIENT)
Facility: LOCATION | Age: 39
End: 2024-03-18
Payer: COMMERCIAL

## 2024-03-18 VITALS — HEART RATE: 98 BPM | TEMPERATURE: 97 F

## 2024-03-18 DIAGNOSIS — Z93.3 STATUS POST HARTMANN PROCEDURE (HCC): Primary | ICD-10-CM

## 2024-03-18 DIAGNOSIS — K68.19 RETROPERITONEAL ABSCESS (HCC): ICD-10-CM
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
## 2024-03-20 ENCOUNTER — TELEPHONE (OUTPATIENT)
Dept: WOUND CARE | Facility: HOSPITAL | Age: 39
End: 2024-03-20

## 2024-03-20 NOTE — TELEPHONE ENCOUNTER
Staff received a message from the patient that he has question regarding his ostomy.    Returned the call this afternoon and spoke with the patient.Patient wants to know when can he order the  pre cut ostomy pouch.Explained to patient that he has to wait for at least 6 to 8 weeks from the time of surgery.Pt verbalized understanding.    Patient also states that the current treatment plan was working and would like to cancel his appointment this coming Friday.No issues or concern with his ostomy at this time.

## 2024-03-22 ENCOUNTER — APPOINTMENT (OUTPATIENT)
Dept: WOUND CARE | Facility: HOSPITAL | Age: 39
End: 2024-03-22
Payer: COMMERCIAL

## 2024-03-22 DIAGNOSIS — F41.9 ANXIETY: ICD-10-CM

## 2024-03-24 RX ORDER — TRAZODONE HYDROCHLORIDE 50 MG/1
50 TABLET ORAL NIGHTLY
Qty: 90 TABLET | Refills: 0 | Status: SHIPPED | OUTPATIENT
Start: 2024-03-24

## 2024-03-25 DIAGNOSIS — Z98.890 POST-OPERATIVE STATE: ICD-10-CM

## 2024-03-25 DIAGNOSIS — L03.312 CELLULITIS OF BACK EXCEPT BUTTOCK: ICD-10-CM

## 2024-03-25 DIAGNOSIS — T81.43XA POSTPROCEDURAL INTRAABDOMINAL ABSCESS: ICD-10-CM

## 2024-03-26 RX ORDER — MELOXICAM 15 MG/1
15 TABLET ORAL DAILY
Qty: 30 TABLET | Refills: 0 | Status: SHIPPED | OUTPATIENT
Start: 2024-03-26

## 2024-04-07 ENCOUNTER — PATIENT MESSAGE (OUTPATIENT)
Facility: LOCATION | Age: 39
End: 2024-04-07

## 2024-04-07 DIAGNOSIS — K68.19 RETROPERITONEAL ABSCESS (HCC): Primary | ICD-10-CM

## 2024-04-16 ENCOUNTER — HOSPITAL ENCOUNTER (OUTPATIENT)
Dept: CT IMAGING | Facility: HOSPITAL | Age: 39
Discharge: HOME | End: 2024-04-16
Payer: COMMERCIAL

## 2024-04-16 DIAGNOSIS — K68.19 RETROPERITONEAL ABSCESS (HCC): ICD-10-CM

## 2024-04-16 PROCEDURE — 76080 X-RAY EXAM OF FISTULA: CPT

## 2024-04-16 PROCEDURE — 49424 ASSESS CYST CONTRAST INJECT: CPT

## 2024-04-16 NOTE — IMAGING NOTE
Patient here for drain check x 2. Abscessogram's completed and drains removed per Dr Wilson. Tegaderm dressing and antibiotic ointment applied after sites cleaned with chlorhexidine. Anterior L abdomen site slightly moisture at site and drainage noted when cath removed. Pt released with extra dressings/antibiotic ointment and advised to avoid swimming pool or tub baths until both sites completely healed over.

## 2024-04-18 ENCOUNTER — PATIENT MESSAGE (OUTPATIENT)
Facility: LOCATION | Age: 39
End: 2024-04-18

## 2024-04-18 ENCOUNTER — OFFICE VISIT (OUTPATIENT)
Facility: LOCATION | Age: 39
End: 2024-04-18
Payer: COMMERCIAL

## 2024-04-18 VITALS — HEART RATE: 102 BPM | TEMPERATURE: 98 F

## 2024-04-18 DIAGNOSIS — K57.80 PERFORATED DIVERTICULUM: Primary | ICD-10-CM

## 2024-04-18 PROCEDURE — 99024 POSTOP FOLLOW-UP VISIT: CPT | Performed by: STUDENT IN AN ORGANIZED HEALTH CARE EDUCATION/TRAINING PROGRAM

## 2024-04-18 RX ORDER — NEOMYCIN SULFATE 500 MG/1
TABLET ORAL
Qty: 6 TABLET | Refills: 0 | Status: SHIPPED | OUTPATIENT
Start: 2024-04-18

## 2024-04-18 RX ORDER — POLYETHYLENE GLYCOL 3350, SODIUM CHLORIDE, SODIUM BICARBONATE, POTASSIUM CHLORIDE 420; 11.2; 5.72; 1.48 G/4L; G/4L; G/4L; G/4L
POWDER, FOR SOLUTION ORAL
Qty: 1 EACH | Refills: 0 | Status: SHIPPED | OUTPATIENT
Start: 2024-04-18

## 2024-04-18 RX ORDER — METRONIDAZOLE 500 MG/1
TABLET ORAL
Qty: 3 TABLET | Refills: 0 | Status: SHIPPED | OUTPATIENT
Start: 2024-04-18

## 2024-04-18 NOTE — TELEPHONE ENCOUNTER
2/17 OPEN LOWER ANTERIOR RESECTION, CREATION OF END COLOSTOMY, DRAINAGE OF RETROPERITONEAL ABSCESS, POSSIBLE DRAIN REMOVAL,     Drains removed 4/16/24    Does patient continue to have lifting restriction of 5 lbs?
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
## 2024-04-18 NOTE — PATIENT INSTRUCTIONS
During this visit, the Enhanced Recovery after Intestinal Surgery (ERAS) Patient Guide was discussed with Trino. He was provided a copy of the guide to review at home, which includes education on the strategy, pre-op, intra-op and what to expect during the hospital stay for elective colorectal cases.

## 2024-04-23 ENCOUNTER — TELEPHONE (OUTPATIENT)
Facility: LOCATION | Age: 39
End: 2024-04-23

## 2024-05-02 ENCOUNTER — TELEPHONE (OUTPATIENT)
Dept: INTERNAL MEDICINE CLINIC | Facility: CLINIC | Age: 39
End: 2024-05-02

## 2024-05-02 NOTE — TELEPHONE ENCOUNTER
Paperwork was receviced from [facility] order#[id number]. It was placed on Dr Mancilla's desk for review and signature.

## 2024-05-08 ENCOUNTER — OFFICE VISIT (OUTPATIENT)
Dept: WOUND CARE | Facility: HOSPITAL | Age: 39
End: 2024-05-08
Payer: COMMERCIAL

## 2024-05-08 DIAGNOSIS — Z71.89 ENCOUNTER FOR OSTOMY CARE EDUCATION: Primary | ICD-10-CM

## 2024-05-08 PROCEDURE — 99214 OFFICE O/P EST MOD 30 MIN: CPT
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
## 2024-05-09 ENCOUNTER — TELEPHONE (OUTPATIENT)
Facility: LOCATION | Age: 39
End: 2024-05-09

## 2024-05-09 NOTE — TELEPHONE ENCOUNTER
Patient called says he will be submitting Americans with Disabilities Act forms due to upcoming surgery - he is out of FMLA leave.. Patient was provided our dept email and patient states forms are for [employer] [facility] already in patient chart- waiting for forms to be logged

## 2024-05-10 NOTE — TELEPHONE ENCOUNTER
Americans with Disabilities Act form received in the forms department.    Valid auth on file.    Logged for processing.

## 2024-05-15 ENCOUNTER — TELEPHONE (OUTPATIENT)
Facility: LOCATION | Age: 39
End: 2024-05-15

## 2024-05-15 DIAGNOSIS — K57.80 PERFORATED DIVERTICULUM: Primary | ICD-10-CM

## 2024-05-15 NOTE — TELEPHONE ENCOUNTER
Dr. Hernandez,     *The ACKNOWLEDGE button has been moved to the top right ribbon*    Please sign off on form if you agree to: Americans with Disabilities Act forms for leave of absence. 6/26/24-4-6 wks post op  (place your signature on the first page only)    -From your Inbasket, Highlight the patient and click Chart   -Double click the 5/9/24 Forms Completion telephone encounter  -Scroll down to the Media section   -Click the blue Hyperlink: Suad Loyola 5/15/24  -Click Acknowledge located in the top right ribbon/menu   -Drag the mouse into the blank space of the document and a + sign will appear. Left click to   electronically sign the document.     Thank you,    Hui COLE

## 2024-05-16 NOTE — TELEPHONE ENCOUNTER
Forms E faxed     Efax Date: 05/16/24  Time fax sent:8:05 AM  Confirmation time : 12:45 PM  JOB ID:[id number]    Forms completed and signed faxed to [employer] [phone]

## 2024-05-16 NOTE — TELEPHONE ENCOUNTER
** Patient called for status of forms - Advised patient forms will be faxed and will receive Affle message once forms have been faxed and we receive confirmation

## 2024-05-20 ENCOUNTER — TELEPHONE (OUTPATIENT)
Facility: LOCATION | Age: 39
End: 2024-05-20

## 2024-05-20 NOTE — TELEPHONE ENCOUNTER
Patient called stating he needs his forms revised with actual Return to work date. Informed patient he needs to have a post-op appointment with Dr. Hernandez or letter with Return to work date. Patient verbalized understanding. Notified Rep that completed forms.

## 2024-05-20 NOTE — TELEPHONE ENCOUNTER
Dr. Hernandez,    Patient needs an actual return to work date listed on his forms. He was advised to make an appointment to determine that date. His forms you signed off on that we completed indicated a return date of 4-6 wks post op. Do you have a specific return to work date we should use?    Thank you,    Hui COLE

## 2024-05-21 NOTE — TELEPHONE ENCOUNTER
Dr. Loyola,    Spoke with patient and states he got clearance from your office to return to work on 8/8/24. There is a letter in his chart. Do you support?    Thank you,    Hui COLE

## 2024-06-03 RX ORDER — NICOTINE POLACRILEX 4 MG
30 LOZENGE BUCCAL
Status: CANCELLED | OUTPATIENT
Start: 2024-06-03

## 2024-06-03 RX ORDER — ACETAMINOPHEN 500 MG
1000 TABLET ORAL ONCE
Status: DISCONTINUED | OUTPATIENT
Start: 2024-06-03 | End: 2024-06-03

## 2024-06-03 RX ORDER — NICOTINE POLACRILEX 4 MG
30 LOZENGE BUCCAL
Status: DISCONTINUED | OUTPATIENT
Start: 2024-06-03 | End: 2024-06-03

## 2024-06-03 RX ORDER — METRONIDAZOLE 500 MG/100ML
500 INJECTION, SOLUTION INTRAVENOUS ONCE
Status: CANCELLED | OUTPATIENT
Start: 2024-06-03 | End: 2024-06-03

## 2024-06-03 RX ORDER — SCOLOPAMINE TRANSDERMAL SYSTEM 1 MG/1
1 PATCH, EXTENDED RELEASE TRANSDERMAL ONCE
Status: DISCONTINUED | OUTPATIENT
Start: 2024-06-03 | End: 2024-06-03

## 2024-06-03 RX ORDER — SODIUM CHLORIDE 9 MG/ML
INJECTION, SOLUTION INTRAVENOUS CONTINUOUS
Status: CANCELLED | OUTPATIENT
Start: 2024-06-03

## 2024-06-03 RX ORDER — SODIUM CHLORIDE 9 MG/ML
INJECTION, SOLUTION INTRAVENOUS CONTINUOUS
Status: DISCONTINUED | OUTPATIENT
Start: 2024-06-03 | End: 2024-06-03

## 2024-06-03 RX ORDER — HEPARIN SODIUM 5000 [USP'U]/ML
5000 INJECTION, SOLUTION INTRAVENOUS; SUBCUTANEOUS ONCE
Status: DISCONTINUED | OUTPATIENT
Start: 2024-06-03 | End: 2024-06-03

## 2024-06-03 RX ORDER — SCOLOPAMINE TRANSDERMAL SYSTEM 1 MG/1
1 PATCH, EXTENDED RELEASE TRANSDERMAL ONCE
Status: CANCELLED | OUTPATIENT
Start: 2024-06-03 | End: 2024-06-03

## 2024-06-03 RX ORDER — HEPARIN SODIUM 5000 [USP'U]/ML
5000 INJECTION, SOLUTION INTRAVENOUS; SUBCUTANEOUS ONCE
Status: CANCELLED | OUTPATIENT
Start: 2024-06-03 | End: 2024-06-03

## 2024-06-03 RX ORDER — SODIUM CHLORIDE, SODIUM LACTATE, POTASSIUM CHLORIDE, CALCIUM CHLORIDE 600; 310; 30; 20 MG/100ML; MG/100ML; MG/100ML; MG/100ML
INJECTION, SOLUTION INTRAVENOUS CONTINUOUS
Status: DISCONTINUED | OUTPATIENT
Start: 2024-06-03 | End: 2024-06-03

## 2024-06-03 RX ORDER — NICOTINE POLACRILEX 4 MG
15 LOZENGE BUCCAL
Status: CANCELLED | OUTPATIENT
Start: 2024-06-03

## 2024-06-03 RX ORDER — ACETAMINOPHEN 500 MG
1000 TABLET ORAL ONCE
Status: CANCELLED | OUTPATIENT
Start: 2024-06-03 | End: 2024-06-03

## 2024-06-03 RX ORDER — DEXTROSE MONOHYDRATE 25 G/50ML
50 INJECTION, SOLUTION INTRAVENOUS
Status: DISCONTINUED | OUTPATIENT
Start: 2024-06-03 | End: 2024-06-03

## 2024-06-03 RX ORDER — DEXTROSE MONOHYDRATE 25 G/50ML
50 INJECTION, SOLUTION INTRAVENOUS
Status: CANCELLED | OUTPATIENT
Start: 2024-06-03

## 2024-06-03 RX ORDER — NICOTINE POLACRILEX 4 MG
15 LOZENGE BUCCAL
Status: DISCONTINUED | OUTPATIENT
Start: 2024-06-03 | End: 2024-06-03

## 2024-06-03 RX ORDER — METRONIDAZOLE 500 MG/100ML
500 INJECTION, SOLUTION INTRAVENOUS ONCE
Status: DISCONTINUED | OUTPATIENT
Start: 2024-06-03 | End: 2024-06-03

## 2024-06-07 ENCOUNTER — TELEPHONE (OUTPATIENT)
Facility: LOCATION | Age: 39
End: 2024-06-07

## 2024-06-07 ENCOUNTER — EKG ENCOUNTER (OUTPATIENT)
Dept: LAB | Facility: HOSPITAL | Age: 39
End: 2024-06-07
Attending: STUDENT IN AN ORGANIZED HEALTH CARE EDUCATION/TRAINING PROGRAM
Payer: COMMERCIAL

## 2024-06-07 DIAGNOSIS — Z01.818 PRE-OP TESTING: ICD-10-CM

## 2024-06-07 DIAGNOSIS — K57.80 PERFORATED DIVERTICULUM: Primary | ICD-10-CM

## 2024-06-07 LAB
ANION GAP SERPL CALC-SCNC: 7 MMOL/L (ref 0–18)
ATRIAL RATE: 58 BPM
BASOPHILS # BLD AUTO: 0.03 X10(3) UL (ref 0–0.2)
BASOPHILS NFR BLD AUTO: 0.4 %
BUN BLD-MCNC: 12 MG/DL (ref 9–23)
CALCIUM BLD-MCNC: 9.4 MG/DL (ref 8.5–10.1)
CHLORIDE SERPL-SCNC: 105 MMOL/L (ref 98–112)
CO2 SERPL-SCNC: 27 MMOL/L (ref 21–32)
CREAT BLD-MCNC: 0.74 MG/DL
EGFRCR SERPLBLD CKD-EPI 2021: 118 ML/MIN/1.73M2 (ref 60–?)
EOSINOPHIL # BLD AUTO: 0.29 X10(3) UL (ref 0–0.7)
EOSINOPHIL NFR BLD AUTO: 3.4 %
ERYTHROCYTE [DISTWIDTH] IN BLOOD BY AUTOMATED COUNT: 13.1 %
FASTING STATUS PATIENT QL REPORTED: YES
GLUCOSE BLD-MCNC: 96 MG/DL (ref 70–99)
HCT VFR BLD AUTO: 47 %
HGB BLD-MCNC: 15.4 G/DL
IMM GRANULOCYTES # BLD AUTO: 0.03 X10(3) UL (ref 0–1)
IMM GRANULOCYTES NFR BLD: 0.4 %
LYMPHOCYTES # BLD AUTO: 3.51 X10(3) UL (ref 1–4)
LYMPHOCYTES NFR BLD AUTO: 41.5 %
MCH RBC QN AUTO: 29.2 PG (ref 26–34)
MCHC RBC AUTO-ENTMCNC: 32.8 G/DL (ref 31–37)
MCV RBC AUTO: 89 FL
MONOCYTES # BLD AUTO: 0.58 X10(3) UL (ref 0.1–1)
MONOCYTES NFR BLD AUTO: 6.9 %
NEUTROPHILS # BLD AUTO: 4.02 X10 (3) UL (ref 1.5–7.7)
NEUTROPHILS # BLD AUTO: 4.02 X10(3) UL (ref 1.5–7.7)
NEUTROPHILS NFR BLD AUTO: 47.4 %
OSMOLALITY SERPL CALC.SUM OF ELEC: 288 MOSM/KG (ref 275–295)
P AXIS: 32 DEGREES
P-R INTERVAL: 180 MS
PLATELET # BLD AUTO: 256 10(3)UL (ref 150–450)
POTASSIUM SERPL-SCNC: 3.7 MMOL/L (ref 3.5–5.1)
Q-T INTERVAL: 400 MS
QRS DURATION: 104 MS
QTC CALCULATION (BEZET): 392 MS
R AXIS: 62 DEGREES
RBC # BLD AUTO: 5.28 X10(6)UL
SODIUM SERPL-SCNC: 139 MMOL/L (ref 136–145)
T AXIS: 58 DEGREES
VENTRICULAR RATE: 58 BPM
WBC # BLD AUTO: 8.5 X10(3) UL (ref 4–11)

## 2024-06-07 PROCEDURE — 93010 ELECTROCARDIOGRAM REPORT: CPT | Performed by: INTERNAL MEDICINE

## 2024-06-07 PROCEDURE — 85025 COMPLETE CBC W/AUTO DIFF WBC: CPT

## 2024-06-07 PROCEDURE — 36415 COLL VENOUS BLD VENIPUNCTURE: CPT

## 2024-06-07 PROCEDURE — 93005 ELECTROCARDIOGRAM TRACING: CPT

## 2024-06-07 PROCEDURE — 80048 BASIC METABOLIC PNL TOTAL CA: CPT

## 2024-06-10 ENCOUNTER — TELEPHONE (OUTPATIENT)
Dept: SURGERY | Facility: CLINIC | Age: 39
End: 2024-06-10

## 2024-06-10 ENCOUNTER — TELEPHONE (OUTPATIENT)
Facility: LOCATION | Age: 39
End: 2024-06-10

## 2024-06-10 DIAGNOSIS — K57.80 PERFORATED DIVERTICULUM: Primary | ICD-10-CM

## 2024-06-10 DIAGNOSIS — K63.1 COLON PERFORATION (HCC): Primary | ICD-10-CM

## 2024-06-10 NOTE — TELEPHONE ENCOUNTER
Per Deya patient is having a dual surgery with Dr. Quiroz and another physician and they do not have Dr. Schuler's surgical request.    Fax: [phone]

## 2024-06-10 NOTE — TELEPHONE ENCOUNTER
JASMIN ALEJANDRA Patient  Member ID  [id number]    Date of Birth  1985-01-22    Gender  Male    Eligibility Status  Active Coverage    Group Number  [id number]    Plan / Coverage Date  2021-04-16    Transaction Type  Outpatient Authorization    Organization  [facility]    Payer  [facility] (COMMERCIAL & MEDICARE)    Pending sale to [facility] logo  Transaction ID: Not FoundCustomer ID: [id number] Date: NA  No Authorization Required  Place of Service  22 - On [facility]    Service From - To Date  NA    Admission Type  9    Diagnosis Code 1   - Dvtrcli of intest part unsp w perf and abscess w/o bleed    Procedure Code 1  51078    Quantity  1 Units    Procedure From - To Date  2024-06-25    Status  NO AUTH REQUIRED    Message  No precert required. The requested service may not be eligible for coverage . Refer to the online Clinical Policy Bulletins or the Provider Code Search Tool on [facility] website. You may also contact provider services using the Precert number on the member id card.  [id number]

## 2024-06-11 ENCOUNTER — TELEPHONE (OUTPATIENT)
Facility: LOCATION | Age: 39
End: 2024-06-11

## 2024-06-11 DIAGNOSIS — K57.80 PERFORATED DIVERTICULUM: Primary | ICD-10-CM

## 2024-06-11 DIAGNOSIS — K63.1 COLON PERFORATION (HCC): Primary | ICD-10-CM

## 2024-06-14 ENCOUNTER — TELEPHONE (OUTPATIENT)
Facility: LOCATION | Age: 39
End: 2024-06-14

## 2024-06-14 NOTE — TELEPHONE ENCOUNTER
JUSTYNJASMIN GAR Patient  Member ID  [id number]    Date of Birth  1985-01-22    Gender  NA    Transaction Type  Inpatient Authorization    Organization  [facility]    Payer  [employer] (COMMERCIAL & MEDICARE)    [employer] logo      Certificate Information  Certification Number  [id number]    Status  CERTIFIED IN TOTAL    Service Information  Place of Service  21 - Inpatient Hospital    Admission - Discharge Date  2024-06-26    Admission Type  NA    Diagnosis Code 1   - Dvtrcli of intest part unsp w perf and abscess w/o bleed    Procedure Code 1 (CPT/HCPCS)  37634 - REPAIR BOWEL OPENING    Quantity  1 Units    Procedure From - To Date  2024-06-26 - 2024-06-26    Status  CERTIFIED IN TOTAL    Admission Service Details  Certification Number  [id number]    Status  CERTIFIED IN TOTAL    Service Type Code 1  2 - Surgical    Start Date - End Date  2024-06-26 - 2024-06-28    Procedure Codes  Certification Number  [id number]    Status  CERTIFIED IN TOTAL    Procedure Code 1  83031 - REPAIR BOWEL OPENING    Qualifier Code  CPT/HCPCS    Quantity  1 Units  Start Date - End Date  2024-06-26 - 2024-06-26    Procedure Code Quantity  1    Procedure Code Quantity Type  Units

## 2024-06-21 ENCOUNTER — TELEPHONE (OUTPATIENT)
Facility: LOCATION | Age: 39
End: 2024-06-21

## 2024-06-25 ENCOUNTER — ANESTHESIA EVENT (OUTPATIENT)
Dept: ENDOSCOPY | Facility: HOSPITAL | Age: 39
End: 2024-06-25

## 2024-06-25 ENCOUNTER — HOSPITAL ENCOUNTER (OUTPATIENT)
Facility: HOSPITAL | Age: 39
Setting detail: HOSPITAL OUTPATIENT SURGERY
Discharge: HOME | End: 2024-06-25
Attending: STUDENT IN AN ORGANIZED HEALTH CARE EDUCATION/TRAINING PROGRAM | Admitting: STUDENT IN AN ORGANIZED HEALTH CARE EDUCATION/TRAINING PROGRAM

## 2024-06-25 ENCOUNTER — ANESTHESIA (OUTPATIENT)
Dept: ENDOSCOPY | Facility: HOSPITAL | Age: 39
End: 2024-06-25

## 2024-06-25 VITALS
OXYGEN SATURATION: 97 % | WEIGHT: 230 LBS | HEART RATE: 84 BPM | HEIGHT: 74 IN | BODY MASS INDEX: 29.52 KG/M2 | SYSTOLIC BLOOD PRESSURE: 105 MMHG | TEMPERATURE: 98 F | DIASTOLIC BLOOD PRESSURE: 55 MMHG | RESPIRATION RATE: 16 BRPM

## 2024-06-25 DIAGNOSIS — K57.80 PERFORATED DIVERTICULUM: ICD-10-CM

## 2024-06-25 DIAGNOSIS — F41.9 ANXIETY: ICD-10-CM

## 2024-06-25 PROBLEM — K57.20 DIVERTICULITIS OF COLON WITH PERFORATION: Status: ACTIVE | Noted: 2024-02-17

## 2024-06-25 LAB — GLUCOSE BLD-MCNC: 99 MG/DL (ref 70–99)

## 2024-06-25 PROCEDURE — 44388 COLONOSCOPY THRU STOMA SPX: CPT | Performed by: STUDENT IN AN ORGANIZED HEALTH CARE EDUCATION/TRAINING PROGRAM

## 2024-06-25 PROCEDURE — 0DJD8ZZ INSPECTION OF LOWER INTESTINAL TRACT, VIA NATURAL OR ARTIFICIAL OPENING ENDOSCOPIC: ICD-10-PCS | Performed by: STUDENT IN AN ORGANIZED HEALTH CARE EDUCATION/TRAINING PROGRAM

## 2024-06-25 PROCEDURE — 45330 DIAGNOSTIC SIGMOIDOSCOPY: CPT | Performed by: STUDENT IN AN ORGANIZED HEALTH CARE EDUCATION/TRAINING PROGRAM

## 2024-06-25 RX ORDER — NICOTINE POLACRILEX 4 MG
15 LOZENGE BUCCAL
Status: DISCONTINUED | OUTPATIENT
Start: 2024-06-25 | End: 2024-06-25

## 2024-06-25 RX ORDER — SODIUM CHLORIDE, SODIUM LACTATE, POTASSIUM CHLORIDE, CALCIUM CHLORIDE 600; 310; 30; 20 MG/100ML; MG/100ML; MG/100ML; MG/100ML
INJECTION, SOLUTION INTRAVENOUS CONTINUOUS
Status: CANCELLED | OUTPATIENT
Start: 2024-06-25

## 2024-06-25 RX ORDER — SODIUM CHLORIDE, SODIUM LACTATE, POTASSIUM CHLORIDE, CALCIUM CHLORIDE 600; 310; 30; 20 MG/100ML; MG/100ML; MG/100ML; MG/100ML
INJECTION, SOLUTION INTRAVENOUS CONTINUOUS
Status: DISCONTINUED | OUTPATIENT
Start: 2024-06-25 | End: 2024-06-25

## 2024-06-25 RX ORDER — DEXTROSE MONOHYDRATE 25 G/50ML
50 INJECTION, SOLUTION INTRAVENOUS
Status: DISCONTINUED | OUTPATIENT
Start: 2024-06-25 | End: 2024-06-25

## 2024-06-25 RX ORDER — NICOTINE POLACRILEX 4 MG
30 LOZENGE BUCCAL
Status: DISCONTINUED | OUTPATIENT
Start: 2024-06-25 | End: 2024-06-25

## 2024-06-25 RX ADMIN — SODIUM CHLORIDE, SODIUM LACTATE, POTASSIUM CHLORIDE, CALCIUM CHLORIDE: 600; 310; 30; 20 INJECTION, SOLUTION INTRAVENOUS at 10:47:00

## 2024-06-25 RX ADMIN — SODIUM CHLORIDE, SODIUM LACTATE, POTASSIUM CHLORIDE, CALCIUM CHLORIDE: 600; 310; 30; 20 INJECTION, SOLUTION INTRAVENOUS at 09:59:00

## 2024-06-25 NOTE — ANESTHESIA POSTPROCEDURE EVALUATION
[facility]    Trino Reddy Patient Status:  Hospital Outpatient Surgery   Age/Gender 39 year old male MRN [id number]   Location [facility] Attending Jaylan Hernandez MD   Hosp Day # 0 PCP Johnnie Mancilla MD       Anesthesia Post-op Note    COLONOSCOPY VIA STOMA AND FLEXIBLE SIGMOIDOSCOPY VIA RECTUM    Procedure Summary       Date: 06/25/24 Room / Location:  ENDOSCOPY 04 /  ENDOSCOPY    Anesthesia Start: 0959 Anesthesia Stop: 1047    Procedure: COLONOSCOPY VIA STOMA AND FLEXIBLE SIGMOIDOSCOPY VIA RECTUM Diagnosis:       Perforated diverticulum      (Diversion proctitis)    Surgeons: Jaylan Hernandez MD Anesthesiologist: Halie Long MD    Anesthesia Type: MAC ASA Status: 2            Anesthesia Type: MAC    Vitals Value Taken Time   /52 06/25/24 1045   Temp 97 06/25/24 1048   Pulse 95 06/25/24 1048   Resp 14 06/25/24 1048   SpO2 95 % 06/25/24 1048   Vitals shown include unfiled device data.    Patient Location: Endoscopy    Anesthesia Type: MAC    Airway Patency: patent    Postop Pain Control: adequate    Mental Status: mildly sedated but able to meaningfully participate in the post-anesthesia evaluation    Nausea/Vomiting: none    Cardiopulmonary/Hydration status: stable euvolemic    Complications: no apparent anesthesia related complications    Postop vital signs: stable    Dental Exam: Unchanged from Preop    Patient to be discharged from PACU when criteria met.

## 2024-06-25 NOTE — ANESTHESIA PREPROCEDURE EVALUATION
PRE-OP EVALUATION    Patient Name: Trino Reddy    Admit Diagnosis: Perforated diverticulum [K57.80]    Pre-op Diagnosis: Perforated diverticulum [K57.80]    COLONOSCOPY    Anesthesia Procedure: COLONOSCOPY    Surgeons and Role:     * Jaylan Hernandez MD - Primary    Pre-op vitals reviewed.  Temp: 97.7 °F (36.5 °C)  Pulse: 62  Resp: 16  BP: 108/64  SpO2: 99 %  Body mass index is 29.53 kg/m².    Current medications reviewed.  Hospital Medications:   glucose (Dex4) 15 GM/59ML oral liquid 15 g  15 g Oral Q15 Min PRN    Or    glucose (Glutose) 40% oral gel 15 g  15 g Oral Q15 Min PRN    Or    glucose-vitamin C (Dex-4) chewable tab 4 tablet  4 tablet Oral Q15 Min PRN    Or    dextrose 50% injection 50 mL  50 mL Intravenous Q15 Min PRN    Or    glucose (Dex4) 15 GM/59ML oral liquid 30 g  30 g Oral Q15 Min PRN    Or    glucose (Glutose) 40% oral gel 30 g  30 g Oral Q15 Min PRN    Or    glucose-vitamin C (Dex-4) chewable tab 8 tablet  8 tablet Oral Q15 Min PRN    lactated ringers infusion   Intravenous Continuous       Outpatient Medications:     Medications Prior to Admission   Medication Sig Dispense Refill Last Dose    PEG 3350-KCl-Na Bicarb-NaCl 420 g Oral Recon Soln Starting at 4:00 pm the night before procedure, drink 8 ounces of the prep every 15-20 minutes until finished.  PLEASE REMAIN ON A CLEAR LIQUID DIET AFTER COLONOSCOPY 1 each 0 6/24/2024    Meloxicam 15 MG Oral Tab Take 1 tablet (15 mg total) by mouth daily. 30 tablet 0 Past Month    multivitamin Oral Chew Tab Chew 1 tablet by mouth daily. 90 tablet 0 Past Week    metFORMIN HCl 1000 MG Oral Tab Take 1 tablet (1,000 mg total) by mouth 2 (two) times daily with meals. (Patient taking differently: Take 1 tablet (1,000 mg total) by mouth daily with breakfast.) 60 tablet 0 6/23/2024    Omeprazole 40 MG Oral Capsule Delayed Release Take 1 capsule (40 mg total) by mouth daily.   6/24/2024    neomycin 500 MG Oral Tab Take 2 tablets by mouth at 1pm, 2pm and  11pm 6 tablet 0 More than a month    metRONIDAZOLE (FLAGYL) 500 MG Oral Tab Take 1 tablet by mouth at 1pm, 2pm and 11pm 3 tablet 0 More than a month    traZODone 50 MG Oral Tab Take 1 tablet (50 mg total) by mouth nightly. 90 tablet 0 More than a month    oxyCODONE 5 MG Oral Tab Take 1 tablet (5 mg total) by mouth every 6 (six) hours as needed for Pain. 20 tablet 0 More than a month       Allergies: Patient has no known allergies.      Anesthesia Evaluation    Patient summary reviewed.    Anesthetic Complications  (-) history of anesthetic complications         GI/Hepatic/Renal      (+) GERD                           Cardiovascular        Exercise tolerance: good     MET: >4                             (-) angina     (-) ROGER         Endo/Other      (+) diabetes  type 2, not using insulin                         Pulmonary    Negative pulmonary ROS.                       Neuro/Psych      (+) depression                                Past Surgical History:   Procedure Laterality Date    Colonoscopy      Other surgical history  2024    exploratory laparotomy, dari procedure    Part removal colon w end colostomy  2024     Social History     Socioeconomic History    Marital status: Single   Tobacco Use    Smoking status: Former     Current packs/day: 0.00     Types: Cigarettes     Quit date:      Years since quittin.4    Smokeless tobacco: Never   Vaping Use    Vaping status: Every Day    Substances: Nicotine    Devices: Pre-filled or refillable cartridge   Substance and Sexual Activity    Alcohol use: Not Currently    Drug use: Yes     Comment: THC GUMMIES OCC    Sexual activity: Not Currently     History   Drug Use     Comment: THC GUMMIES OCC     Available pre-op labs reviewed.  Lab Results   Component Value Date    WBC 8.5 2024    RBC 5.28 2024    HGB 15.4 2024    HCT 47.0 2024    MCV 89.0 2024    MCH 29.2 2024    MCHC 32.8 2024    RDW 13.1 2024     .0 06/07/2024     Lab Results   Component Value Date     06/07/2024    K 3.7 06/07/2024     06/07/2024    CO2 27.0 06/07/2024    BUN 12 06/07/2024    CREATSERUM 0.74 06/07/2024    GLU 96 06/07/2024    CA 9.4 06/07/2024            Airway      Mallampati: II  Mouth opening: >3 FB  TM distance: > 6 cm  Neck ROM: full Cardiovascular    Cardiovascular exam normal.         Dental             Pulmonary    Pulmonary exam normal.                 Other findings              ASA: 2   Plan: MAC  NPO status verified and Patient does not meet NPO guidelines.  Patient has not taken beta blockers in last 24 hours.  Post-procedure pain management plan discussed with surgeon and patient.    Comment:     A detailed discussion about the anesthetic plan was held with Trino Reddy in the preoperative area. Benefits and risks of MAC anesthesia were discussed, including intraoperative awareness/recall, PONV, reasonable expectations of post-operative pain/discomfort, aspiration, conversion to general anesthesia, dental injury, pressure/nerve injuries from positioning, and other serious but rare complications (life-threatening cardiopulmonary events). All questions were answered appropriately and patient demonstrated understanding of realistic expectations and risks of undergoing anesthesia. Trino Reddy consents to receiving anesthesia and wishes to proceed.     Plan/risks discussed with: patient                Present on Admission:  **None**

## 2024-06-25 NOTE — OPERATIVE REPORT
[facility]  Operative Note    Trino Reddy Location: OR   [facility] [id number] MRN [id number]    1985 Age 39 year old   Admission Date 2024 Operation Date 2024   Attending Physician Jaylan Hernandez MD Operating Physician Jaylan Hernandez MD   PCP Johnnie Mancilla MD          Patient Name: Trino Reddy    Preoperative Diagnosis: Perforated diverticulum [K57.80]    Postoperative Diagnosis:   Diversion proctitis    Primary Surgeon: Jaylan Hernandez MD    Anesthesia: MAC    Procedures: Flexible sigmoidoscopy, colonoscopy via stoma to the cecum    Specimen:   None    Estimated Blood Loss: None    Complications: None immediate    Condition: Good    Indications for Surgery:   This is a very nice 39-year-old gentleman who returns for follow-up after undergoing emergent open low anterior resection with creation of end colostomy and drainage of retroperitoneal abscess when he presented with perforated diverticulitis on 2024.  Postoperative course was complicated by development of a recurrent left-sided retroperitoneal abscess.  He underwent image guided percutaneous drainage of the abscess on 2024 and again on 3/1/2024.     Patient is doing well at this time.  His drains were removed on 2024.  He denies any abdominal pain, nausea, vomiting or fevers.  He is having formed brown stool output from his colostomy.  He has lost about 10 pounds since surgery.  He is staying very active and walks about 3 miles a day.  He is anxious to have his colostomy reversed as soon as reasonably possible.  He has never had a colonoscopy.    Patient is here today for preoperative colonoscopy via stoma and flexible sigmoidoscopy prior to planned elective colostomy reversal scheduled for tomorrow.  Patient completed the bowel prep as instructed.  Consent was signed.  All questions answered.     Surgical Findings:   The quality of the bowel prep was good.  Mild diversion proctitis    Description of  Procedure:   The patient was taken to the endoscopy suite and positioned in the left lateral decubitus position with knees flexed. Monitored anesthesia care was administered. A time-out was performed.    The perineum and perianal skin were examined. A digital rectal examination was performed.  These were both normal. A well-lubricated adult colonoscope was then inserted through the anus and carefully navigated to the top of the rectal stump. CO2 insufflation was used throughout the procedure. Advancement was accomplished with ease. The scope was then withdrawn as the mucosa was circumferentially examined.  There was a fair amount of stool within the rectal stump which was irrigated, suctioned out and evacuated as much as possible.  Majority of the rectal mucosa was able to be examined though small polyps could have been missed.  There was mild diversion proctitis.  The rectal stump measured around 20 cm in length from the anal verge.  Insufflation was suctioned from the rectal stump as the colonoscope was removed.    Patient was then positioned supine.  A well-lubricated adult colonoscope was inserted through the colostomy and carefully navigated to the cecum.  Again, CO2 insufflation was used throughout the procedure.  Advancement was accomplished with ease. The appendiceal orifice and ileocecal valve were identified and photographed. The terminal ileum was not intubated. The scope was then withdrawn as the mucosa was circumferentially examined.     It was difficult to maintain insufflation within the colon.  However, the majority of the colonic mucosa was visualized.  The colon appeared pink and healthy without any obvious masses, polyps or inflammatory changes.  Small polyps could have been missed due to inadequate insufflation. The scope was straightened and excess gas was suctioned from the colon and the scope removed, terminating the procedure.    Anesthesia was terminated and the patient transported to the  recovery unit in good condition. The patient tolerated the procedure well without apparent intraoperative complication.    Follow up:   Patient will need next colonoscopy at age 45 for screening purposes.  Patient should stay on clear liquids today and complete oral antibiotics in anticipation of surgery tomorrow per ERAS protocol.      Jaylan Hernandez MD  6/25/2024  10:43 AM

## 2024-06-25 NOTE — DISCHARGE INSTRUCTIONS
Home Care Instructions for Colonoscopy with Sedation    Diet:  - Resume your regular diet   - Start with light meals to minimize bloating.  - Do not drink alcohol today.    Medication:  - If you have questions about resuming your normal medications, please contact your Primary Care Physician.    Activities:  - Take it easy today. Do not return to work today.  - Do not drive today.  - Do not operate any machinery today (including kitchen equipment).    Colonoscopy:  - You may notice some rectal \"spotting\" (a little blood on the toilet tissue) for a day or two after the exam. This is normal.  - If you experience any rectal bleeding (not spotting), persistent tenderness or sharp severe abdominal pains, oral temperature over 100 degrees Fahrenheit, light-headedness or dizziness, or any other problems, contact your doctor.    **If unable to reach your doctor, please go to the [facility] Emergency Room**    - Your referring physician will receive a full report of your examination.  - If you do not hear from your doctor's office within two weeks of your biopsy, please call them for your results.
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
## 2024-06-26 ENCOUNTER — HOSPITAL ENCOUNTER (INPATIENT)
Facility: HOSPITAL | Age: 39
LOS: 4 days | Discharge: HOME | DRG: 330 | End: 2024-06-30
Attending: STUDENT IN AN ORGANIZED HEALTH CARE EDUCATION/TRAINING PROGRAM | Admitting: STUDENT IN AN ORGANIZED HEALTH CARE EDUCATION/TRAINING PROGRAM
Payer: COMMERCIAL

## 2024-06-26 ENCOUNTER — ANESTHESIA (OUTPATIENT)
Dept: SURGERY | Facility: HOSPITAL | Age: 39
DRG: 330 | End: 2024-06-26
Payer: COMMERCIAL

## 2024-06-26 ENCOUNTER — ANESTHESIA EVENT (OUTPATIENT)
Dept: SURGERY | Facility: HOSPITAL | Age: 39
DRG: 330 | End: 2024-06-26
Payer: COMMERCIAL

## 2024-06-26 DIAGNOSIS — Z01.818 PRE-OP TESTING: Primary | ICD-10-CM

## 2024-06-26 DIAGNOSIS — K57.80 PERFORATED DIVERTICULUM: ICD-10-CM

## 2024-06-26 DIAGNOSIS — Z98.890 HISTORY OF COLOSTOMY REVERSAL: ICD-10-CM

## 2024-06-26 PROBLEM — Z79.4 TYPE 2 DIABETES MELLITUS WITHOUT COMPLICATION, WITH LONG-TERM CURRENT USE OF INSULIN (HCC): Status: ACTIVE | Noted: 2024-06-26

## 2024-06-26 PROBLEM — K57.92 DIVERTICULITIS: Status: ACTIVE | Noted: 2024-06-26

## 2024-06-26 PROBLEM — E11.9 TYPE 2 DIABETES MELLITUS WITHOUT COMPLICATION, WITH LONG-TERM CURRENT USE OF INSULIN (HCC): Status: ACTIVE | Noted: 2024-06-26

## 2024-06-26 LAB
EST. AVERAGE GLUCOSE BLD GHB EST-MCNC: 123 MG/DL (ref 68–126)
GLUCOSE BLD-MCNC: 108 MG/DL (ref 70–99)
GLUCOSE BLD-MCNC: 120 MG/DL (ref 70–99)
GLUCOSE BLD-MCNC: 155 MG/DL (ref 70–99)
GLUCOSE BLD-MCNC: 87 MG/DL (ref 70–99)
HBA1C MFR BLD: 5.9 % (ref ?–5.7)

## 2024-06-26 PROCEDURE — 52005 CYSTO W/URTRL CATHJ: CPT | Performed by: SURGERY

## 2024-06-26 PROCEDURE — 8E0W4CZ ROBOTIC ASSISTED PROCEDURE OF TRUNK REGION, PERCUTANEOUS ENDOSCOPIC APPROACH: ICD-10-PCS | Performed by: STUDENT IN AN ORGANIZED HEALTH CARE EDUCATION/TRAINING PROGRAM

## 2024-06-26 PROCEDURE — 76942 ECHO GUIDE FOR BIOPSY: CPT | Performed by: ANESTHESIOLOGY

## 2024-06-26 PROCEDURE — 44227 LAP CLOSE ENTEROSTOMY: CPT | Performed by: SURGERY

## 2024-06-26 PROCEDURE — 3E0T3BZ INTRODUCTION OF ANESTHETIC AGENT INTO PERIPHERAL NERVES AND PLEXI, PERCUTANEOUS APPROACH: ICD-10-PCS | Performed by: STUDENT IN AN ORGANIZED HEALTH CARE EDUCATION/TRAINING PROGRAM

## 2024-06-26 PROCEDURE — 4A1685H MONITORING OF LYMPHATIC FLOW USING INDOCYANINE GREEN DYE, VIA NATURAL OR ARTIFICIAL OPENING ENDOSCOPIC: ICD-10-PCS | Performed by: SURGERY

## 2024-06-26 PROCEDURE — 0DBM4ZZ EXCISION OF DESCENDING COLON, PERCUTANEOUS ENDOSCOPIC APPROACH: ICD-10-PCS | Performed by: STUDENT IN AN ORGANIZED HEALTH CARE EDUCATION/TRAINING PROGRAM

## 2024-06-26 PROCEDURE — 44227 LAP CLOSE ENTEROSTOMY: CPT | Performed by: STUDENT IN AN ORGANIZED HEALTH CARE EDUCATION/TRAINING PROGRAM

## 2024-06-26 PROCEDURE — 0T788DZ DILATION OF BILATERAL URETERS WITH INTRALUMINAL DEVICE, VIA NATURAL OR ARTIFICIAL OPENING ENDOSCOPIC: ICD-10-PCS | Performed by: SURGERY

## 2024-06-26 PROCEDURE — 99232 SBSQ HOSP IP/OBS MODERATE 35: CPT | Performed by: STUDENT IN AN ORGANIZED HEALTH CARE EDUCATION/TRAINING PROGRAM

## 2024-06-26 RX ORDER — SODIUM CHLORIDE 9 MG/ML
INJECTION, SOLUTION INTRAVENOUS CONTINUOUS
Status: DISCONTINUED | OUTPATIENT
Start: 2024-06-26 | End: 2024-06-26

## 2024-06-26 RX ORDER — ROCURONIUM BROMIDE 10 MG/ML
INJECTION, SOLUTION INTRAVENOUS AS NEEDED
Status: DISCONTINUED | OUTPATIENT
Start: 2024-06-26 | End: 2024-06-26 | Stop reason: SURG

## 2024-06-26 RX ORDER — NICOTINE POLACRILEX 4 MG
30 LOZENGE BUCCAL
Status: DISCONTINUED | OUTPATIENT
Start: 2024-06-26 | End: 2024-06-30

## 2024-06-26 RX ORDER — DEXTROSE MONOHYDRATE 25 G/50ML
50 INJECTION, SOLUTION INTRAVENOUS
Status: DISCONTINUED | OUTPATIENT
Start: 2024-06-26 | End: 2024-06-30

## 2024-06-26 RX ORDER — MEPERIDINE HYDROCHLORIDE 25 MG/ML
12.5 INJECTION INTRAMUSCULAR; INTRAVENOUS; SUBCUTANEOUS AS NEEDED
Status: DISCONTINUED | OUTPATIENT
Start: 2024-06-26 | End: 2024-06-26 | Stop reason: HOSPADM

## 2024-06-26 RX ORDER — OXYCODONE HYDROCHLORIDE 10 MG/1
10 TABLET ORAL EVERY 4 HOURS PRN
Status: DISCONTINUED | OUTPATIENT
Start: 2024-06-26 | End: 2024-06-30

## 2024-06-26 RX ORDER — METOCLOPRAMIDE HYDROCHLORIDE 5 MG/ML
INJECTION INTRAMUSCULAR; INTRAVENOUS AS NEEDED
Status: DISCONTINUED | OUTPATIENT
Start: 2024-06-26 | End: 2024-06-26 | Stop reason: SURG

## 2024-06-26 RX ORDER — MIDAZOLAM HYDROCHLORIDE 1 MG/ML
INJECTION INTRAMUSCULAR; INTRAVENOUS AS NEEDED
Status: DISCONTINUED | OUTPATIENT
Start: 2024-06-26 | End: 2024-06-26 | Stop reason: SURG

## 2024-06-26 RX ORDER — HYDROMORPHONE HYDROCHLORIDE 1 MG/ML
0.4 INJECTION, SOLUTION INTRAMUSCULAR; INTRAVENOUS; SUBCUTANEOUS EVERY 5 MIN PRN
Status: DISCONTINUED | OUTPATIENT
Start: 2024-06-26 | End: 2024-06-26 | Stop reason: HOSPADM

## 2024-06-26 RX ORDER — METRONIDAZOLE 500 MG/100ML
500 INJECTION, SOLUTION INTRAVENOUS ONCE
Status: COMPLETED | OUTPATIENT
Start: 2024-06-26 | End: 2024-06-26

## 2024-06-26 RX ORDER — KETOROLAC TROMETHAMINE 30 MG/ML
INJECTION, SOLUTION INTRAMUSCULAR; INTRAVENOUS AS NEEDED
Status: DISCONTINUED | OUTPATIENT
Start: 2024-06-26 | End: 2024-06-26 | Stop reason: SURG

## 2024-06-26 RX ORDER — SODIUM CHLORIDE, SODIUM LACTATE, POTASSIUM CHLORIDE, CALCIUM CHLORIDE 600; 310; 30; 20 MG/100ML; MG/100ML; MG/100ML; MG/100ML
INJECTION, SOLUTION INTRAVENOUS CONTINUOUS PRN
Status: DISCONTINUED | OUTPATIENT
Start: 2024-06-26 | End: 2024-06-26 | Stop reason: SURG

## 2024-06-26 RX ORDER — SODIUM CHLORIDE, SODIUM LACTATE, POTASSIUM CHLORIDE, CALCIUM CHLORIDE 600; 310; 30; 20 MG/100ML; MG/100ML; MG/100ML; MG/100ML
2 INJECTION, SOLUTION INTRAVENOUS CONTINUOUS
Status: DISCONTINUED | OUTPATIENT
Start: 2024-06-26 | End: 2024-06-26

## 2024-06-26 RX ORDER — BUPIVACAINE HYDROCHLORIDE AND EPINEPHRINE 2.5; 5 MG/ML; UG/ML
INJECTION, SOLUTION EPIDURAL; INFILTRATION; INTRACAUDAL; PERINEURAL AS NEEDED
Status: DISCONTINUED | OUTPATIENT
Start: 2024-06-26 | End: 2024-06-26 | Stop reason: HOSPADM

## 2024-06-26 RX ORDER — METRONIDAZOLE 500 MG/100ML
500 INJECTION, SOLUTION INTRAVENOUS EVERY 12 HOURS
Status: COMPLETED | OUTPATIENT
Start: 2024-06-26 | End: 2024-06-28

## 2024-06-26 RX ORDER — DIPHENHYDRAMINE HYDROCHLORIDE 50 MG/ML
12.5 INJECTION INTRAMUSCULAR; INTRAVENOUS AS NEEDED
Status: DISCONTINUED | OUTPATIENT
Start: 2024-06-26 | End: 2024-06-26 | Stop reason: HOSPADM

## 2024-06-26 RX ORDER — MIDAZOLAM HYDROCHLORIDE 1 MG/ML
1 INJECTION INTRAMUSCULAR; INTRAVENOUS EVERY 5 MIN PRN
Status: DISCONTINUED | OUTPATIENT
Start: 2024-06-26 | End: 2024-06-26 | Stop reason: HOSPADM

## 2024-06-26 RX ORDER — HYDROMORPHONE HYDROCHLORIDE 1 MG/ML
0.2 INJECTION, SOLUTION INTRAMUSCULAR; INTRAVENOUS; SUBCUTANEOUS EVERY 5 MIN PRN
Status: DISCONTINUED | OUTPATIENT
Start: 2024-06-26 | End: 2024-06-26 | Stop reason: HOSPADM

## 2024-06-26 RX ORDER — NEOSTIGMINE METHYLSULFATE 1 MG/ML
INJECTION, SOLUTION INTRAVENOUS AS NEEDED
Status: DISCONTINUED | OUTPATIENT
Start: 2024-06-26 | End: 2024-06-26 | Stop reason: SURG

## 2024-06-26 RX ORDER — KETAMINE HYDROCHLORIDE 50 MG/ML
INJECTION, SOLUTION INTRAMUSCULAR; INTRAVENOUS AS NEEDED
Status: DISCONTINUED | OUTPATIENT
Start: 2024-06-26 | End: 2024-06-26 | Stop reason: SURG

## 2024-06-26 RX ORDER — MAGNESIUM OXIDE 400 MG/1
400 TABLET ORAL DAILY
Status: DISCONTINUED | OUTPATIENT
Start: 2024-06-26 | End: 2024-06-30

## 2024-06-26 RX ORDER — FAMOTIDINE 10 MG/ML
20 INJECTION, SOLUTION INTRAVENOUS 2 TIMES DAILY
Status: DISCONTINUED | OUTPATIENT
Start: 2024-06-26 | End: 2024-06-27

## 2024-06-26 RX ORDER — HYDROCODONE BITARTRATE AND ACETAMINOPHEN 10; 325 MG/1; MG/1
2 TABLET ORAL ONCE AS NEEDED
Status: DISCONTINUED | OUTPATIENT
Start: 2024-06-26 | End: 2024-06-26 | Stop reason: HOSPADM

## 2024-06-26 RX ORDER — SODIUM CHLORIDE 9 MG/ML
INJECTION, SOLUTION INTRAVENOUS CONTINUOUS PRN
Status: DISCONTINUED | OUTPATIENT
Start: 2024-06-26 | End: 2024-06-26 | Stop reason: SURG

## 2024-06-26 RX ORDER — PROCHLORPERAZINE EDISYLATE 5 MG/ML
5 INJECTION INTRAMUSCULAR; INTRAVENOUS EVERY 8 HOURS PRN
Status: DISCONTINUED | OUTPATIENT
Start: 2024-06-26 | End: 2024-06-30

## 2024-06-26 RX ORDER — HYDROMORPHONE HYDROCHLORIDE 1 MG/ML
0.6 INJECTION, SOLUTION INTRAMUSCULAR; INTRAVENOUS; SUBCUTANEOUS EVERY 5 MIN PRN
Status: DISCONTINUED | OUTPATIENT
Start: 2024-06-26 | End: 2024-06-26 | Stop reason: HOSPADM

## 2024-06-26 RX ORDER — ONDANSETRON 2 MG/ML
INJECTION INTRAMUSCULAR; INTRAVENOUS AS NEEDED
Status: DISCONTINUED | OUTPATIENT
Start: 2024-06-26 | End: 2024-06-26 | Stop reason: SURG

## 2024-06-26 RX ORDER — NALOXONE HYDROCHLORIDE 0.4 MG/ML
0.08 INJECTION, SOLUTION INTRAMUSCULAR; INTRAVENOUS; SUBCUTANEOUS AS NEEDED
Status: DISCONTINUED | OUTPATIENT
Start: 2024-06-26 | End: 2024-06-26 | Stop reason: HOSPADM

## 2024-06-26 RX ORDER — HYDROMORPHONE HYDROCHLORIDE 1 MG/ML
0.4 INJECTION, SOLUTION INTRAMUSCULAR; INTRAVENOUS; SUBCUTANEOUS EVERY 2 HOUR PRN
Status: DISCONTINUED | OUTPATIENT
Start: 2024-06-26 | End: 2024-06-30

## 2024-06-26 RX ORDER — OXYCODONE HYDROCHLORIDE 5 MG/1
5 TABLET ORAL EVERY 4 HOURS PRN
Status: DISCONTINUED | OUTPATIENT
Start: 2024-06-26 | End: 2024-06-30

## 2024-06-26 RX ORDER — PROCHLORPERAZINE EDISYLATE 5 MG/ML
5 INJECTION INTRAMUSCULAR; INTRAVENOUS EVERY 8 HOURS PRN
Status: DISCONTINUED | OUTPATIENT
Start: 2024-06-26 | End: 2024-06-26 | Stop reason: HOSPADM

## 2024-06-26 RX ORDER — NICOTINE POLACRILEX 4 MG
15 LOZENGE BUCCAL
Status: DISCONTINUED | OUTPATIENT
Start: 2024-06-26 | End: 2024-06-30

## 2024-06-26 RX ORDER — ONDANSETRON 2 MG/ML
4 INJECTION INTRAMUSCULAR; INTRAVENOUS EVERY 6 HOURS PRN
Status: DISCONTINUED | OUTPATIENT
Start: 2024-06-26 | End: 2024-06-26 | Stop reason: HOSPADM

## 2024-06-26 RX ORDER — LIDOCAINE HYDROCHLORIDE ANHYDROUS AND DEXTROSE MONOHYDRATE .8; 5 G/100ML; G/100ML
INJECTION, SOLUTION INTRAVENOUS CONTINUOUS PRN
Status: DISCONTINUED | OUTPATIENT
Start: 2024-06-26 | End: 2024-06-26 | Stop reason: SURG

## 2024-06-26 RX ORDER — HYDROMORPHONE HYDROCHLORIDE 1 MG/ML
INJECTION, SOLUTION INTRAMUSCULAR; INTRAVENOUS; SUBCUTANEOUS
Status: COMPLETED
Start: 2024-06-26 | End: 2024-06-26

## 2024-06-26 RX ORDER — ACETAMINOPHEN 500 MG
1000 TABLET ORAL ONCE AS NEEDED
Status: DISCONTINUED | OUTPATIENT
Start: 2024-06-26 | End: 2024-06-26 | Stop reason: HOSPADM

## 2024-06-26 RX ORDER — ONDANSETRON 2 MG/ML
4 INJECTION INTRAMUSCULAR; INTRAVENOUS EVERY 6 HOURS PRN
Status: DISCONTINUED | OUTPATIENT
Start: 2024-06-26 | End: 2024-06-30

## 2024-06-26 RX ORDER — SODIUM CHLORIDE, SODIUM LACTATE, POTASSIUM CHLORIDE, CALCIUM CHLORIDE 600; 310; 30; 20 MG/100ML; MG/100ML; MG/100ML; MG/100ML
INJECTION, SOLUTION INTRAVENOUS CONTINUOUS
Status: DISCONTINUED | OUTPATIENT
Start: 2024-06-26 | End: 2024-06-28

## 2024-06-26 RX ORDER — INDOCYANINE GREEN AND WATER 25 MG
KIT INJECTION AS NEEDED
Status: DISCONTINUED | OUTPATIENT
Start: 2024-06-26 | End: 2024-06-26 | Stop reason: HOSPADM

## 2024-06-26 RX ORDER — ACETAMINOPHEN 10 MG/ML
INJECTION, SOLUTION INTRAVENOUS AS NEEDED
Status: DISCONTINUED | OUTPATIENT
Start: 2024-06-26 | End: 2024-06-26 | Stop reason: SURG

## 2024-06-26 RX ORDER — HYDROCODONE BITARTRATE AND ACETAMINOPHEN 10; 325 MG/1; MG/1
1 TABLET ORAL ONCE AS NEEDED
Status: DISCONTINUED | OUTPATIENT
Start: 2024-06-26 | End: 2024-06-26 | Stop reason: HOSPADM

## 2024-06-26 RX ORDER — FAMOTIDINE 20 MG/1
20 TABLET, FILM COATED ORAL 2 TIMES DAILY
Status: DISCONTINUED | OUTPATIENT
Start: 2024-06-26 | End: 2024-06-30

## 2024-06-26 RX ORDER — HEPARIN SODIUM 5000 [USP'U]/ML
5000 INJECTION, SOLUTION INTRAVENOUS; SUBCUTANEOUS ONCE
Status: COMPLETED | OUTPATIENT
Start: 2024-06-26 | End: 2024-06-26

## 2024-06-26 RX ORDER — GLYCOPYRROLATE 0.2 MG/ML
INJECTION, SOLUTION INTRAMUSCULAR; INTRAVENOUS AS NEEDED
Status: DISCONTINUED | OUTPATIENT
Start: 2024-06-26 | End: 2024-06-26 | Stop reason: SURG

## 2024-06-26 RX ORDER — SCOLOPAMINE TRANSDERMAL SYSTEM 1 MG/1
1 PATCH, EXTENDED RELEASE TRANSDERMAL ONCE
Status: DISCONTINUED | OUTPATIENT
Start: 2024-06-26 | End: 2024-06-26 | Stop reason: HOSPADM

## 2024-06-26 RX ORDER — KETOROLAC TROMETHAMINE 15 MG/ML
15 INJECTION, SOLUTION INTRAMUSCULAR; INTRAVENOUS EVERY 6 HOURS
Status: DISPENSED | OUTPATIENT
Start: 2024-06-26 | End: 2024-06-28

## 2024-06-26 RX ORDER — LIDOCAINE HYDROCHLORIDE 10 MG/ML
INJECTION, SOLUTION EPIDURAL; INFILTRATION; INTRACAUDAL; PERINEURAL AS NEEDED
Status: DISCONTINUED | OUTPATIENT
Start: 2024-06-26 | End: 2024-06-26 | Stop reason: SURG

## 2024-06-26 RX ORDER — HYDROMORPHONE HYDROCHLORIDE 1 MG/ML
0.8 INJECTION, SOLUTION INTRAMUSCULAR; INTRAVENOUS; SUBCUTANEOUS EVERY 2 HOUR PRN
Status: DISCONTINUED | OUTPATIENT
Start: 2024-06-26 | End: 2024-06-30

## 2024-06-26 RX ORDER — HEPARIN SODIUM 5000 [USP'U]/ML
5000 INJECTION, SOLUTION INTRAVENOUS; SUBCUTANEOUS EVERY 8 HOURS SCHEDULED
Status: DISCONTINUED | OUTPATIENT
Start: 2024-06-27 | End: 2024-06-30

## 2024-06-26 RX ORDER — ACETAMINOPHEN 500 MG
1000 TABLET ORAL ONCE
Status: DISCONTINUED | OUTPATIENT
Start: 2024-06-26 | End: 2024-06-26 | Stop reason: HOSPADM

## 2024-06-26 RX ORDER — SODIUM CHLORIDE, SODIUM LACTATE, POTASSIUM CHLORIDE, CALCIUM CHLORIDE 600; 310; 30; 20 MG/100ML; MG/100ML; MG/100ML; MG/100ML
INJECTION, SOLUTION INTRAVENOUS CONTINUOUS
Status: DISCONTINUED | OUTPATIENT
Start: 2024-06-26 | End: 2024-06-26 | Stop reason: HOSPADM

## 2024-06-26 RX ORDER — ACETAMINOPHEN 325 MG/1
650 TABLET ORAL
Status: DISCONTINUED | OUTPATIENT
Start: 2024-06-26 | End: 2024-06-27

## 2024-06-26 RX ORDER — INDOCYANINE GREEN AND WATER 25 MG
KIT INJECTION AS NEEDED
Status: DISCONTINUED | OUTPATIENT
Start: 2024-06-26 | End: 2024-06-26 | Stop reason: SURG

## 2024-06-26 RX ADMIN — SODIUM CHLORIDE: 9 INJECTION, SOLUTION INTRAVENOUS at 07:31:00

## 2024-06-26 RX ADMIN — GLYCOPYRROLATE 0.8 MG: 0.2 INJECTION, SOLUTION INTRAMUSCULAR; INTRAVENOUS at 14:58:00

## 2024-06-26 RX ADMIN — INDOCYANINE GREEN AND WATER 7.5 MG: 25 MG KIT INJECTION at 13:36:00

## 2024-06-26 RX ADMIN — MIDAZOLAM HYDROCHLORIDE 2 MG: 1 INJECTION INTRAMUSCULAR; INTRAVENOUS at 07:31:00

## 2024-06-26 RX ADMIN — ROCURONIUM BROMIDE 10 MG: 10 INJECTION, SOLUTION INTRAVENOUS at 08:38:00

## 2024-06-26 RX ADMIN — ACETAMINOPHEN 1000 MG: 10 INJECTION, SOLUTION INTRAVENOUS at 14:21:00

## 2024-06-26 RX ADMIN — KETOROLAC TROMETHAMINE 30 MG: 30 INJECTION, SOLUTION INTRAMUSCULAR; INTRAVENOUS at 14:31:00

## 2024-06-26 RX ADMIN — LIDOCAINE HYDROCHLORIDE ANHYDROUS AND DEXTROSE MONOHYDRATE 2 MG/KG/HR: .8; 5 INJECTION, SOLUTION INTRAVENOUS at 07:55:00

## 2024-06-26 RX ADMIN — SODIUM CHLORIDE: 9 INJECTION, SOLUTION INTRAVENOUS at 07:47:00

## 2024-06-26 RX ADMIN — KETAMINE HYDROCHLORIDE 25 MG: 50 INJECTION, SOLUTION INTRAMUSCULAR; INTRAVENOUS at 07:55:00

## 2024-06-26 RX ADMIN — ONDANSETRON 4 MG: 2 INJECTION INTRAMUSCULAR; INTRAVENOUS at 14:31:00

## 2024-06-26 RX ADMIN — BUPIVACAINE HYDROCHLORIDE AND EPINEPHRINE 30 ML: 2.5; 5 INJECTION, SOLUTION EPIDURAL; INFILTRATION; INTRACAUDAL; PERINEURAL at 14:54:00

## 2024-06-26 RX ADMIN — NEOSTIGMINE METHYLSULFATE 5 MG: 1 INJECTION, SOLUTION INTRAVENOUS at 14:58:00

## 2024-06-26 RX ADMIN — METRONIDAZOLE 500 MG: 500 INJECTION, SOLUTION INTRAVENOUS at 07:52:00

## 2024-06-26 RX ADMIN — ROCURONIUM BROMIDE 10 MG: 10 INJECTION, SOLUTION INTRAVENOUS at 13:33:00

## 2024-06-26 RX ADMIN — BUPIVACAINE HYDROCHLORIDE AND EPINEPHRINE 30 ML: 2.5; 5 INJECTION, SOLUTION EPIDURAL; INFILTRATION; INTRACAUDAL; PERINEURAL at 14:56:00

## 2024-06-26 RX ADMIN — SODIUM CHLORIDE, SODIUM LACTATE, POTASSIUM CHLORIDE, CALCIUM CHLORIDE: 600; 310; 30; 20 INJECTION, SOLUTION INTRAVENOUS at 14:38:00

## 2024-06-26 RX ADMIN — ROCURONIUM BROMIDE 10 MG: 10 INJECTION, SOLUTION INTRAVENOUS at 10:04:00

## 2024-06-26 RX ADMIN — LIDOCAINE HYDROCHLORIDE 50 MG: 10 INJECTION, SOLUTION EPIDURAL; INFILTRATION; INTRACAUDAL; PERINEURAL at 07:35:00

## 2024-06-26 RX ADMIN — ROCURONIUM BROMIDE 10 MG: 10 INJECTION, SOLUTION INTRAVENOUS at 11:30:00

## 2024-06-26 RX ADMIN — ROCURONIUM BROMIDE 50 MG: 10 INJECTION, SOLUTION INTRAVENOUS at 07:36:00

## 2024-06-26 RX ADMIN — METOCLOPRAMIDE HYDROCHLORIDE 10 MG: 5 INJECTION INTRAMUSCULAR; INTRAVENOUS at 08:25:00

## 2024-06-26 NOTE — CONSULTS
[facility] Note: Antimicrobial Weight Based Dose Adjustment for: ceftriaxone (ROCEPHIN)    Trino Reddy is a 39 year old patient who has been prescribed ceftriaxone (ROCEPHIN) 1000 mg every 24 hours x2 doses.    CrCl cannot be calculated (Patient's most recent lab result is older than the maximum 7 days allowed.).  Wt Readings from Last 6 Encounters:   06/26/24 103 kg (227 lb)   06/25/24 104.3 kg (230 lb)   02/28/24 118.8 kg (261 lb 12.8 oz)   02/21/24 131.2 kg (289 lb 3.9 oz)   09/27/23 123.1 kg (271 lb 6.4 oz)   06/01/23 119.3 kg (263 lb)     Body mass index is 29.15 kg/m².    Based on this criteria and renal function, dose will be adjusted to ceftriaxone (ROCEPHIN) 2000 mg every 24 hours x2 doses.    Thank you,    Ewelina Mercer, PharmD  6/26/2024  5:05 PM

## 2024-06-26 NOTE — ANESTHESIA PROCEDURE NOTES
Regional Block    Date/Time: 6/26/2024 2:52 PM    Performed by: Allison Peterson CRNA  Authorized by: Tate Richards MD      General Information and Staff    Start Time:  6/26/2024 2:52 PM  End Time:  6/26/2024 2:57 PM  Anesthesiologist:  Tate Richards MD  CRNA:  Allison Peterson CRNA  Performed by:  CRNA  Patient Location:  OR    Block Placement: Post Induction  Site Identification: real time ultrasound guided and image stored and retrievable    Block site/laterality marked before start: site marked  Reason for Block: at surgeon's request and post-op pain management    Preanesthetic Checklist: 2 patient identifers, IV checked, risks and benefits discussed, monitors and equipment checked, pre-op evaluation, timeout performed, anesthesia consent, sterile technique used, no prohibitive neurological deficits and no local skin infection at insertion site      Procedure Details    Patient Position:  Supine  Prep: ChloraPrep    Monitoring:  Cardiac monitor, continuous pulse ox and blood pressure cuff  Block Type:  TAP  Laterality:  Bilateral  Injection Technique:  Single-shot    Needle    Needle Type:  Short-bevel and echogenic  Needle Gauge:  21 G  Needle Length:  110 mm  Needle Localization:  Ultrasound guidance  Reason for Ultrasound Use: appropriate spread of the medication was noted in real time and no ultrasound evidence of intravascular and/or intraneural injection            Assessment    Injection Assessment:  Good spread noted, negative resistance, negative aspiration for heme, incremental injection and low pressure  Heart Rate Change: No    - Patient tolerated block procedure well without evidence of immediate block related complications.     Medications  6/26/2024 2:52 PM      Additional Comments    Medication:  Bupivacaine 0.25% 30mL  with 1:200,000 epi to each side (total 60ml)

## 2024-06-26 NOTE — ANESTHESIA POSTPROCEDURE EVALUATION
[facility]    Trino Reddy Patient Status:  Inpatient   Age/Gender 39 year old male MRN [id number]   Location [facility] SURGERY Attending Jaylan Hernandez MD   Hosp Day # 0 PCP Johnnie Mancilla MD       Anesthesia Post-op Note    ROBOTIC COLOSTOMY REVERSAL POSSIBLE OPEN, LYSIS OF ADHESIONS    Procedure Summary       Date: 06/26/24 Room / Location:  MAIN OR 08 /  MAIN OR    Anesthesia Start: 0731 Anesthesia Stop: 1509    Procedures:       ROBOTIC COLOSTOMY REVERSAL POSSIBLE OPEN, LYSIS OF ADHESIONS (Abdomen)      CYSTOSCOPY AND BILATERAL URETERAL STENT PLACEMENT, URETHRAL DILATION (BIEBEL) (Penis) Diagnosis:       Perforated diverticulum      (Perforated diverticulum [K57.80])    Surgeons: Jaylan Hernandez MD; North Schuler MD Anesthesiologist: Tate Richards MD    Anesthesia Type: general ASA Status: 2            Anesthesia Type: general    Vitals Value Taken Time   /71 06/26/24 1512   Temp 97.7 °F (36.5 °C) 06/26/24 1512   Pulse 99 06/26/24 1512   Resp 14 06/26/24 1512   SpO2 100 % 06/26/24 1512       Patient Location: PACU    Anesthesia Type: general    Airway Patency: patent    Postop Pain Control: adequate    Mental Status: mildly sedated but able to meaningfully participate in the post-anesthesia evaluation    Nausea/Vomiting: none    Cardiopulmonary/Hydration status: stable euvolemic    Complications: no apparent anesthesia related complications    Postop vital signs: stable    Comments: Pt breathing comfortably, VSS, following commands. Report to ALAN Richardson.     Dental Exam: Unchanged from Preop    Patient to be discharged from PACU when criteria met.

## 2024-06-26 NOTE — PROGRESS NOTES
A&Ox4. VSS. RA.   GI: Abdomen soft, nondistended.  Denies nausea.  : Voids with rodrigez in place, clear red output  Pain controlled with PRN pain medications  Up with standby assist.  Drains: L side wound vac, rodrigez catheter, RLQ zach drain with bloody output  Incisions: x4 lap sites with skin glue, old midline  Diet: clears  IVF running per order. Iv rocephin and flagyl  All appropriate safety measures in place. Patient updated on plan of care. All questions and concerns addressed.

## 2024-06-26 NOTE — ANESTHESIA PROCEDURE NOTES
Airway  Date/Time: 6/26/2024 7:40 AM  Urgency: elective    Airway not difficult    General Information and Staff    Patient location during procedure: OR  Anesthesiologist: Tate Richards MD  Resident/CRNA: Allison Peterson CRNA  Performed: CRNA   Performed by: Allison Peterson CRNA  Authorized by: Tate Richards MD      Indications and Patient Condition  Indications for airway management: anesthesia  Spontaneous Ventilation: absent  Sedation level: deep  Preoxygenated: yes  Patient position: sniffing  Mask difficulty assessment: 2 - vent by mask + OA or adjuvant +/- NMBA    Final Airway Details  Final airway type: endotracheal airway      Successful airway: ETT  Cuffed: yes   Successful intubation technique: direct laryngoscopy  Facilitating devices/methods: intubating stylet and anterior pressure/BURP  Endotracheal tube insertion site: oral  Blade: Sara  Blade size: #4  ETT size (mm): 7.5    Cormack-Lehane Classification: grade I - full view of glottis  Placement verified by: capnometry   Measured from: lips  ETT to lips (cm): 23  Number of attempts at approach: 1

## 2024-06-26 NOTE — PROGRESS NOTES
NURSING ADMISSION NOTE      Patient admitted via  bed from PACU  Oriented to room.  Safety precautions initiated.  Bed in low position.  Call light in reach.

## 2024-06-26 NOTE — ANESTHESIA PREPROCEDURE EVALUATION
PRE-OP EVALUATION    Patient Name: Trino Reddy    Admit Diagnosis: Perforated diverticulum [K57.80]    Pre-op Diagnosis: Perforated diverticulum [K57.80]    ROBOTIC COLOSTOMY REVERSAL POSSIBLE OPEN. CYSTOSCOPY AND BILATERAL URETERAL STENT PLACEMENT ( Biebel)    Anesthesia Procedure: ROBOTIC COLOSTOMY REVERSAL POSSIBLE OPEN. CYSTOSCOPY AND BILATERAL URETERAL STENT PLACEMENT ( Biebel)  CYSTOSCOPY STENT INSERTION    Surgeons and Role:  Panel 1:     * Jaylan Hernandez MD - Primary  Panel 2:     * North Schuler MD - Primary    Pre-op vitals reviewed.  Temp: 97.9 °F (36.6 °C)  Pulse: 66  Resp: 16  BP: 116/71  SpO2: 99 %  Body mass index is 29.15 kg/m².    Current medications reviewed.  Hospital Medications:   acetaminophen (Tylenol Extra Strength) tab 1,000 mg  1,000 mg Oral Once    scopolamine (Transderm-Scop) 1 MG/3DAYS patch 1 patch  1 patch Transdermal Once    glucose (Dex4) 15 GM/59ML oral liquid 15 g  15 g Oral Q15 Min PRN    Or    glucose (Glutose) 40% oral gel 15 g  15 g Oral Q15 Min PRN    Or    glucose-vitamin C (Dex-4) chewable tab 4 tablet  4 tablet Oral Q15 Min PRN    Or    dextrose 50% injection 50 mL  50 mL Intravenous Q15 Min PRN    Or    glucose (Dex4) 15 GM/59ML oral liquid 30 g  30 g Oral Q15 Min PRN    Or    glucose (Glutose) 40% oral gel 30 g  30 g Oral Q15 Min PRN    Or    glucose-vitamin C (Dex-4) chewable tab 8 tablet  8 tablet Oral Q15 Min PRN    [COMPLETED] heparin (Porcine) 5000 UNIT/ML injection 5,000 Units  5,000 Units Subcutaneous Once    cefTRIAXone (Rocephin) 2 g in sodium chloride 0.9% 100 mL IVPB-ADDV  2 g Intravenous Once    metRONIDAZOLE in sodium chloride 0.79% (Flagyl) 5 mg/mL IVPB premix 500 mg  500 mg Intravenous Once    sodium chloride 0.9% infusion   Intravenous Continuous       Outpatient Medications:     Medications Prior to Admission   Medication Sig Dispense Refill Last Dose    PEG 3350-KCl-Na Bicarb-NaCl 420 g Oral Recon Soln Starting at 4:00 pm the night before  procedure, drink 8 ounces of the prep every 15-20 minutes until finished.  PLEASE REMAIN ON A CLEAR LIQUID DIET AFTER COLONOSCOPY 1 each 0 2024    neomycin 500 MG Oral Tab Take 2 tablets by mouth at 1pm, 2pm and 11pm 6 tablet 0 2024    metRONIDAZOLE (FLAGYL) 500 MG Oral Tab Take 1 tablet by mouth at 1pm, 2pm and 11pm 3 tablet 0 2024    traZODone 50 MG Oral Tab Take 1 tablet (50 mg total) by mouth nightly. 90 tablet 0 2024    multivitamin Oral Chew Tab Chew 1 tablet by mouth daily. 90 tablet 0 2024    metFORMIN HCl 1000 MG Oral Tab Take 1 tablet (1,000 mg total) by mouth 2 (two) times daily with meals. 60 tablet 0 2024    Omeprazole 40 MG Oral Capsule Delayed Release Take 1 capsule (40 mg total) by mouth daily.   2024       Allergies: Patient has no known allergies.      Anesthesia Evaluation    Patient summary reviewed.    Anesthetic Complications  (-) history of anesthetic complications         GI/Hepatic/Renal    Negative GI/hepatic/renal ROS.                             Cardiovascular        Exercise tolerance: good     MET: >4                                           Endo/Other      (+) diabetes  type 2,                          Pulmonary    Negative pulmonary ROS.                       Neuro/Psych      (+) depression  (+) anxiety                              Past Surgical History:   Procedure Laterality Date    Colonoscopy      Other surgical history  2024    exploratory laparotomy, dari procedure    Part removal colon w end colostomy  2024     Social History     Socioeconomic History    Marital status: Single   Tobacco Use    Smoking status: Former     Current packs/day: 0.00     Types: Cigarettes     Quit date:      Years since quittin.4    Smokeless tobacco: Never   Vaping Use    Vaping status: Every Day    Substances: Nicotine    Devices: Pre-filled or refillable cartridge   Substance and Sexual Activity    Alcohol use: Not Currently    Drug use:  Yes     Comment: THC GUMMIES OCC    Sexual activity: Not Currently     History   Drug Use     Comment: THC GUMMIES OCC     Available pre-op labs reviewed.  Lab Results   Component Value Date    WBC 8.5 06/07/2024    RBC 5.28 06/07/2024    HGB 15.4 06/07/2024    HCT 47.0 06/07/2024    MCV 89.0 06/07/2024    MCH 29.2 06/07/2024    MCHC 32.8 06/07/2024    RDW 13.1 06/07/2024    .0 06/07/2024     Lab Results   Component Value Date     06/07/2024    K 3.7 06/07/2024     06/07/2024    CO2 27.0 06/07/2024    BUN 12 06/07/2024    CREATSERUM 0.74 06/07/2024    GLU 96 06/07/2024    CA 9.4 06/07/2024            Airway      Mallampati: II  Mouth opening: >3 FB  TM distance: 4 - 6 cm  Neck ROM: full Cardiovascular    Cardiovascular exam normal.         Dental             Pulmonary    Pulmonary exam normal.                 Other findings              ASA: 2   Plan: general  NPO status verified and patient meets guidelines.          Plan/risks discussed with: patient                Present on Admission:  **None**

## 2024-06-26 NOTE — OPERATIVE REPORT
Urology Operative Note    Attending Surgeon: North Schuler MD    Assistant Surgeon: None    Patient Name: Trino Reddy    Date of Surgery: 6/26/2024    Preoperative Diagnosis: Request for preoperative ureteral catheters    Postoperative Diagnosis: Same    Procedure Performed: Cystoscopy, bilateral ureteral catheter placement and injection of indocyanine green (ICG)    Indication:  Patient is a 39 year old male who presented for robotic colostomy reversal with Dr. Hernandez.  Preoperative ureteral catheter placement was requested from urology for ureteral identification during surgery.  The patient was counseled on options, risks, and benefits and elected to undergo the above procedure. We discussed risks including, but not limited to, bleeding, infection, damage to surrounding structures, need for repeat procedure(s). The patient understood these risks and wished to proceed with surgery.    Findings:  Uncomplicated placement of bilateral ureteral catheters.  Normal bladder on cystoscopy.    Procedure:  The patient was taken to the operating room and a timeout was performed confirming the correct patient and procedure. The patient was prepped and draped in lithotomy position after undergoing general anesthesia. Pre-operative prophylactic antibiotics were given.    The cystoscope was inserted per urethra and the bladder was inspected and drained.  The urethra and bladder appeared normal.  The left ureteral orifice was cannulated with a sensor wire followed by a 5 Niuean open-ended catheter into the mid ureter.  The wire was removed and a 5 mL of dilute ICG (3 mL ICG + 7 mL sterile saline) was injected into the open-ended catheter.  The open-ended catheter was pulled back to the distal ureter and an additional 5 mL of dilute ICG was injected. The sensor wire was reinserted into the catheter and advanced until resistance was met in the kidney.  The open-ended catheter was then advanced over the wire until  resistance was met and it was likely in the renal pelvis or upper pole calyx.  The wire was removed and the end of the left open-ended catheter was cut under a 45 degree angle.    The right ureteral orifice was cannulated with a sensor wire followed by a 5 Singaporean open-ended catheter into the mid ureter.  The wire was removed and a 5 mL of dilute ICG (3 mL ICG + 7 mL sterile saline) was injected into the open-ended catheter.  The open-ended catheter was pulled back to the distal ureter and an additional 5 mL of dilute ICG was injected. The sensor wire was reinserted into the catheter and advanced until resistance was met in the kidney.  The open-ended catheter was then advanced over the wire until resistance was met and it was likely in the renal pelvis or upper pole calyx. The end of the open-ended catheter was cut at a right angle.    An 18 Singaporean Monalisa Sargent catheter was placed into the bladder and the balloon was inflated with 10 mL sterile water.  The ureteral catheters were secured into the catheter side ports.    The patient was then turned back over to the primary team to proceed with the planned surgery.    Specimens:   None    Estimated Blood Loss:  1 mL    Tubes/Drains:  Bilateral 5 Singaporean open-ended ureteral catheters, 18 Singaporean Monalisa urethral Sargent catheter    Complications:   None immediate    Condition from OR:  Stable    Plan:   If no concerns for ureteral injury during surgery, okay to remove bilateral ureteral catheters at the end of surgery.    North Schuler MD  Staff Urologist  [facility]  Office: [phone]

## 2024-06-26 NOTE — OPERATIVE REPORT
[facility]  Operative Note    Trino Reddy Location: OR   CSN [id number] MRN [id number]    1985 Age 39 year old   Admission Date 2024 Operation Date 2024   Attending Physician Jaylan Hernandez MD Operating Physician Jaylan Hernandez MD   PCP Johnnie Mancilla MD          Patient Name: Trino Reddy    Preoperative Diagnosis: Perforated diverticulum [K57.80]    Postoperative Diagnosis: History of perforated diverticulitis, colostomy present    Primary Surgeon: Jaylan Hernandez MD    Assistant: Alma ZAMORA, Yanira Oglesby MD    Anesthesia: General    Procedures: Robotic colostomy reversal, extensive lysis of adhesions x 2 hours    Implants: None    Specimen: Colostomy    Drains: 19 Turkish pelvic Jaden drain    Estimated Blood Loss: 50 cc    Complications: None immediate    Condition: Stable    Indications for Surgery:   This is a very nice 39-year-old gentleman who underwent emergent open low anterior resection with creation of end colostomy and drainage of retroperitoneal abscess when he presented with perforated diverticulitis on 2024.  Postoperative course was complicated by development of a recurrent left-sided retroperitoneal abscess.  He underwent image guided percutaneous drainage of the abscess on 2024 and again on 3/1/2024.     Patient is doing well at this time.  His drains were removed on 2024.  He has lost about 10 pounds since surgery.  He is staying very active and walks about 3 miles a day.  He is anxious to have his colostomy reversed as soon as reasonably possible.  If flexible sigmoidoscopy and colonoscopy via stoma yesterday 2024.  The flexible sigmoidoscopy revealed mild diversion proctitis.  The colonoscopy revealed a healthy-appearing colon without any obvious masses, polyps or inflammatory changes.  However, it was difficult to maintain insufflation within the colon.  Small polyps could have been missed.    Patient presents today for  elective robotic colostomy reversal, possible open.  The details of surgery were discussed including the expected recovery time, risks, benefits and alternatives.   Specifically, risks of surgery were discussed including but not limited to pain, bleeding, infection, bowel or ureteral injury, and anastomotic complications such as leak, bleeding or stricture. There is also a risk of possible ostomy creation at the time of surgery.  Dr. Schuler from urology is kindly assisting with performing preoperative cystoscopy and bilateral ureteral stent placement.     Patient was agreeable to proceed with surgery as scheduled today.  He completed the preoperative bowel prep and oral antibiotics per ERAS protocol.  Consent was signed.  All questions answered.    Surgical Findings:   Extensive omental adhesions to the abdominal wall.  Small bowel adhesions to the pelvis and to the rectal stump.  Extensive small bowel adhesions to the lateral abdominal wall and left-sided retroperitoneum. Patent, non-bleeding, tension-free, well-vascularized colorectal anastomosis with negative intraoperative leak test.     Description of Procedure:   Patient was brought to the operating room and laid supine on the OR table on a pink foam pad.  Bilateral sequential compression devices were placed. Preoperative antibiotics were given.  Patient was induced under general endotracheal anesthesia.  Dr. Schuler from urology performed a cystoscopy and bilateral ureteral stent placement.  Please see his operative report for details of this portion of the procedure.  The patient was positioned in lithotomy and both arms were tucked with all pressure points well-padded.  A rectal irrigation tube was inserted. The abdomen was prepped and draped in the usual sterile fashion.  A timeout was performed.     I began by establishing pneumoperitoneum using a Veress needle through a stab incision at Nickerson's point. There was appropriately low opening pressure.  An 8  mm optical robotic trocar was inserted under direct vision through an incision made just above and to the right of the umbilicus.  There were fairly extensive omental adhesions especially to the midline abdominal wall.  An additional 5 mm AirSeal laparoscopic trocar was placed in the left upper lateral abdomen.  I began taking down the omental adhesions to the upper midline abdominal wall using cauterized scissors.  Eventually, I was able to visualize the Veress needle which was free-floating within the peritoneal cavity and was removed. There was no evidence of underlying visceral injury.  The remaining omental adhesions to the right side of the abdominal wall were taken down using laparoscopic scissors with cautery.  A 12 mm robotic trocar was inserted under direct vision in the right lower quadrant.  An additional 8 mm robotic trocar was inserted in the left upper quadrant just off midline. The da Lu Xi robotic platform was brought into the field and docked.     The remaining omental adhesions to the abdominal wall and pelvic peritoneum were divided using a combination of blunt and sharp dissection with cauterized scissors.  The end transverse colostomy was identified.  Any adhesions around the colostomy were divided using cauterized scissors.     Patient was then positioned in steep Trendelenburg. There were some small bowel adhesions in the pelvis.  These adhesions were sharply divided using scissors.  The small bowel was able to be swept out of the pelvis.  I was able to identify the rectal stump in the pelvis which was tagged with a blue Prolene suture.  The rectal stump was retracted cephalad and superiorly out of the pelvis. The peritoneum was scored along the reflection between the mesorectum and retroperitoneum.  The dissection was continued from the right side then proceeded posteriorly then onto the left side along the areolar avascular TME plane.  Both ureters were identified and preserved  throughout the dissection.     The rectal stump was rather long but I chose not to divide any further colon did not compromise length or increase any tension on the planned anastomosis.  The rectal stump was irrigated with warm saline solution.  No leaks were identified in the rectal stump staple line.  A well-lubricated 25 mm EEA sizer was able to be passed through the anus and up to the top of the rectal stump with ease.     The da Lu Xi robotic platform was then undocked.  Patient was taken out of Trendelenburg. Sterile drapes were placed around the left lower quadrant colostomy. An elliptical skin incision was made around the left lower quadrant colostomy. This incision was deepened down into the subcutaneous tissue with electrocautery. I was able to identify the wall of the colon and free up any adhesions between the colon wall and abdominal wall using electrocautery.  Insufflation was stopped. The colon was then able to be brought out through the colostomy aperture with ease.       I identified a healthy, soft portion of the colon just proximal to the colostomy as a planned transection point.  The mesentery leading up to the planned transection point was ligated with 0 silk ties and divided.  The colon was then cut at the planned proximal transection point and the distal end of the colstomy specimen was passed off the field to be sent to pathology.       A 2-0 Prolene pursestring suture was placed into the proximal colon conduit. The colon was able to accommodate a 29 mm EEA sizer with ease.  Therefore, the anvil of a 29 mm EEA was used and secured in place using the 2-0 Prolene pursestring suture.  The anvil was further secured using a 0 Vicryl tie.  The fat overlying the staple line was carefully cleared off of the serosa using electrocautery. The proximal colon with anvil in place was then placed back into the abdomen.     The entire OR team then changed gown and gloves. An Janes wound retractor was  placed through the colostomy aperture and twisted upon itself so that pneumoperitoneum could be re-established.  The da Lu Xi robotic platform was redocked.  I proceeded to further mobilize the transverse colon.  The transverse colon was freed from its omental attachments using a vessel sealer.  There were adhesions tethering the distal transverse mesocolon to the left upper quadrant retroperitoneum.  These adhesions were divided with a vessel sealer.  The pancreas was identified and protected during this dissection.  This dissection was quite tedious and eventually, the transverse colon appeared to be entirely mobilized to the point where he would reach down to the pelvis without tension.     The patient was again positioned in steep Trendelenburg.  The small bowel was swept cephalad and to the right side out of the pelvis.  The colon conduit appeared to be sufficiently mobilized to reach down to the pelvis and rectal stump with no tension. The perfusion to the colon conduit and rectal stump was checked using ICG and the firefly camera and confirmed to be robust. The anus was dilated up to 2 fingerbreadths and a 25 mm followed by 29 mm EEA sizer was passed up just anterior to the rectal stump staple line with ease.  This was followed by the EEA stapler.   The spike was brought out anterior to the staple line.  The anvil was connected to the spike ensuring there was no twisting of the proximal colon conduit, the anvil and spike were brought together, and a stapled anastomosis was performed.  The EEA stapler was removed with ease. There were 2 robust, intact donuts seen within the stapler.  There was a small serosal tear of the rectal stump just distal to the anastomosis.  The anterior staple line was reinforced using a running 3-0 VLoc suture in a Lembert fashion to repair the serosal tear.  The proximal colon was clamped and the pelvis was filled with warm saline solution.  A leak test was performed using a  rigid proctoscope and the rectum insufflated until there was passage of air through the anus.  No bubbles were seen.  The proctoscope was removed as the insufflation was evacuated.     The abdomen and pelvis were thoroughly irrigated and suctioned and appeared hemostatic. The fascia at the 12 mm trocar site was closed using a single interrupted 0 PDS suture with the assistance of a Tate-Noemí device.  Prior to tying down the fascial suture, a 19 Angolan Jaden drain was positioned through the 12 mm trocar site and positioned in the pelvis.  The patient was leveled.  The fascial suture was tied. The drain was secured in place using a 2-0 nylon suture.  The patient was leveled. The fascia at the former colostomy site was closed using a running #1 PDS suture.     Hemostasis was achieved at each skin incision using electrocautery.  The skin incisions were anesthetized using a total of 30 cc of 0.25% Marcaine.  The skin and subcutaneous tissue was irrigated with warm saline solution.  Each laparoscopic skin incision was closed using interrupted 4-0 Monocryl suture in a subcuticular fashion.  The skin and subcutaneous tissue at the former colostomy site incision was reapproximated using interrupted 3-0 Vicryl suture.  The skin was loosely approximated at this site using a skin stapler. The skin was cleaned and dried. Dermabond was placed over each laparoscopic incision site as a final dressing.  A drain sponge was placed around the drain and secured in place with paper tape. The former colostomy site incision was dressed with a Prevena wound VAC.     Anesthesia team performed a postoperative TAP block.  The ureteral stents were removed.  The patient was awakened from anesthesia, extubated and transported to the postanesthesia care unit in stable condition. All sponge, needle instrument counts were correct at the end of the case.  I was present for the entire case.      Jaylan Hernandez MD  6/26/2024  3:52 PM
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
## 2024-06-26 NOTE — ANESTHESIA PROCEDURE NOTES
Peripheral IV  Date/Time: 6/26/2024 7:47 AM  Inserted by: Allison Peterson CRNA    Placement  Needle size: 18 G  Laterality: right  Location: hand  Local anesthetic: none  Site prep: alcohol  Technique: anatomical landmarks  Attempts: 1

## 2024-06-27 LAB
ANION GAP SERPL CALC-SCNC: 7 MMOL/L (ref 0–18)
BASOPHILS # BLD AUTO: 0.03 X10(3) UL (ref 0–0.2)
BASOPHILS NFR BLD AUTO: 0.3 %
BUN BLD-MCNC: 10 MG/DL (ref 9–23)
CALCIUM BLD-MCNC: 8.3 MG/DL (ref 8.5–10.1)
CHLORIDE SERPL-SCNC: 108 MMOL/L (ref 98–112)
CO2 SERPL-SCNC: 22 MMOL/L (ref 21–32)
CREAT BLD-MCNC: 0.93 MG/DL
EGFRCR SERPLBLD CKD-EPI 2021: 107 ML/MIN/1.73M2 (ref 60–?)
EOSINOPHIL # BLD AUTO: 0.06 X10(3) UL (ref 0–0.7)
EOSINOPHIL NFR BLD AUTO: 0.6 %
ERYTHROCYTE [DISTWIDTH] IN BLOOD BY AUTOMATED COUNT: 13.3 %
GLUCOSE BLD-MCNC: 77 MG/DL (ref 70–99)
GLUCOSE BLD-MCNC: 81 MG/DL (ref 70–99)
GLUCOSE BLD-MCNC: 89 MG/DL (ref 70–99)
GLUCOSE BLD-MCNC: 94 MG/DL (ref 70–99)
GLUCOSE BLD-MCNC: 95 MG/DL (ref 70–99)
HCT VFR BLD AUTO: 38.1 %
HGB BLD-MCNC: 12.8 G/DL
IMM GRANULOCYTES # BLD AUTO: 0.05 X10(3) UL (ref 0–1)
IMM GRANULOCYTES NFR BLD: 0.5 %
LYMPHOCYTES # BLD AUTO: 1.44 X10(3) UL (ref 1–4)
LYMPHOCYTES NFR BLD AUTO: 14.6 %
MAGNESIUM SERPL-MCNC: 1.6 MG/DL (ref 1.6–2.6)
MCH RBC QN AUTO: 29.1 PG (ref 26–34)
MCHC RBC AUTO-ENTMCNC: 33.6 G/DL (ref 31–37)
MCV RBC AUTO: 86.6 FL
MONOCYTES # BLD AUTO: 0.97 X10(3) UL (ref 0.1–1)
MONOCYTES NFR BLD AUTO: 9.9 %
NEUTROPHILS # BLD AUTO: 7.29 X10 (3) UL (ref 1.5–7.7)
NEUTROPHILS # BLD AUTO: 7.29 X10(3) UL (ref 1.5–7.7)
NEUTROPHILS NFR BLD AUTO: 74.1 %
OSMOLALITY SERPL CALC.SUM OF ELEC: 283 MOSM/KG (ref 275–295)
PHOSPHATE SERPL-MCNC: 4.2 MG/DL (ref 2.5–4.9)
PLATELET # BLD AUTO: 224 10(3)UL (ref 150–450)
POTASSIUM SERPL-SCNC: 3.8 MMOL/L (ref 3.5–5.1)
RBC # BLD AUTO: 4.4 X10(6)UL
SODIUM SERPL-SCNC: 137 MMOL/L (ref 136–145)
WBC # BLD AUTO: 9.8 X10(3) UL (ref 4–11)

## 2024-06-27 PROCEDURE — 99232 SBSQ HOSP IP/OBS MODERATE 35: CPT | Performed by: STUDENT IN AN ORGANIZED HEALTH CARE EDUCATION/TRAINING PROGRAM

## 2024-06-27 RX ORDER — ACETAMINOPHEN 500 MG
1000 TABLET ORAL EVERY 8 HOURS
Status: DISCONTINUED | OUTPATIENT
Start: 2024-06-27 | End: 2024-06-30

## 2024-06-27 RX ORDER — MAGNESIUM OXIDE 400 MG/1
400 TABLET ORAL ONCE
Status: DISCONTINUED | OUTPATIENT
Start: 2024-06-27 | End: 2024-06-30

## 2024-06-27 RX ORDER — CYCLOBENZAPRINE HCL 10 MG
10 TABLET ORAL 3 TIMES DAILY
Status: DISCONTINUED | OUTPATIENT
Start: 2024-06-27 | End: 2024-06-30

## 2024-06-27 NOTE — PROGRESS NOTES
[facility]   part of [facility]     Hospitalist Progress Note     Trino Reddy Patient Status:  Inpatient    1985 MRN [id number]   Location [facility] 3NW-A Attending Jaylan Hernandez MD   Hosp Day # 1 PCP Johnnie Mancilla MD     Chief Complaint: Med mgmt    Subjective:     Patient resting in chair and reports occasional abdominal pain. He denies nausea vomiting     Objective:    Review of Systems:   A comprehensive review of systems was completed; pertinent positive and negatives stated in subjective.    Vital signs:  Temp:  [97.5 °F (36.4 °C)-98.2 °F (36.8 °C)] 97.9 °F (36.6 °C)  Pulse:  [70-99] 70  Resp:  [12-21] 18  BP: (108-146)/(61-86) 108/66  SpO2:  [96 %-100 %] 97 %  FiO2 (%):  [40 %] 40 %    Physical Exam:    General: No acute distress  Respiratory: No wheezes, no rhonchi  Cardiovascular: S1, S2, regular rate and rhythm  Abdomen: Soft, Non-tender, non-distended, positive bowel sounds  Neuro: No new focal deficits.   Extremities: No edema    Diagnostic Data:    Labs:  Recent Labs   Lab 24  0540   WBC 9.8   HGB 12.8*   MCV 86.6   .0       Recent Labs   Lab 24  0540   GLU 94   BUN 10   CREATSERUM 0.93   CA 8.3*      K 3.8      CO2 22.0       Estimated Creatinine Clearance: 124 mL/min (based on SCr of 0.93 mg/dL).    No results for input(s): \"TROP\", \"TROPHS\", \"CK\" in the last 168 hours.    No results for input(s): \"PTP\", \"INR\" in the last 168 hours.               Microbiology    No results found for this visit on 24.      Imaging: Reviewed in Epic.    Medications:    acetaminophen  1,000 mg Oral Q8H    cyclobenzaprine  10 mg Oral TID    magnesium oxide  400 mg Oral Once    metRONIDAZOLE  500 mg Intravenous Q12H    heparin  5,000 Units Subcutaneous Q8H [place]    famotidine  20 mg Oral BID    magnesium oxide  400 mg Oral Daily    ketorolac  15 mg Intravenous Q6H    cefTRIAXone  2 g Intravenous Q24H    insulin aspart  1-68 Units Subcutaneous TID CC     insulin aspart  1-30 Units Subcutaneous TID AC and HS       Assessment & Plan:      #S/p colostomy reversal  #S/p bilateral ureteral stent placement  -Management per surgery  -Urology following     #Diabetes  -Hyperglycemia protocol     #Crohn's disease      Mai Florentino,     Supplementary Documentation:     Quality:  DVT Mechanical Prophylaxis:   SCDs, Early ambuation  DVT Pharmacologic Prophylaxis   Medication    heparin (Porcine) 5000 UNIT/ML injection 5,000 Units                Code Status: Full Code  Sargent: Sargent catheter in place  Sargent Duration (in days): 2  Central line:    BRANDY:     The 21st Century Cures Act makes medical notes like these available to patients in the interest of transparency. Please be advised this is a medical document. Medical documents are intended to carry relevant information, facts as evident, and the clinical opinion of the practitioner. The medical note is intended as peer to peer communication and may appear blunt or direct. It is written in medical language and may contain abbreviations or verbiage that are unfamiliar.

## 2024-06-27 NOTE — OCCUPATIONAL THERAPY NOTE
OCCUPATIONAL THERAPY EVALUATION - INPATIENT     Room Number: 312/312-A  Evaluation Date: 6/27/2024  Type of Evaluation: Initial  Presenting Problem: s/p colostomy reversal, LOBO, B ureteral stent placement 6/26/24    Physician Order: IP Consult to Occupational Therapy  Reason for Therapy: ADL/IADL Dysfunction and Discharge Planning    OCCUPATIONAL THERAPY ASSESSMENT   Patient is currently functioning near baseline with toileting, lower body dressing, bed mobility, transfers, and dynamic standing balance. Prior to admission, patient's baseline is independent.  Patient is requiring  grossly CGA for ADLs and transfers, mod assist for LB dressing  as a result of the following impairments: decreased functional reach, decreased endurance, pain, impaired standing balance, and difficulty maintaining precautions. Occupational Therapy will continue to follow for duration of hospitalization.    Patient will benefit from continued skilled OT Services For duration of hospitalization, however, given the patient is functioning near baseline level do not anticipate skilled therapy needs at discharge       History Related to Current Admission: Patient is a 39 year old male admitted on 6/26/2024 with Presenting Problem: s/p colostomy reversal, LOBO, B ureteral stent placement 6/26/24. Co-Morbidities : s/p open low anterior colon resection, creation of end colostomy, and drainage of abscess 2/17/24, DM, Crohns disease    Recent admission 2/17-2/24 for creation of colostomy, drainage of abscess, sepsis, discharged to his parents' home.    WEIGHT BEARING RESTRICTION  Weight Bearing Restriction: None                Recommendations for nursing staff:   Transfers: 1-person  Toileting location: toilet    EVALUATION SESSION:  Patient Start of Session: supine  FUNCTIONAL TRANSFER ASSESSMENT  Sit to Stand: Edge of Bed  Edge of Bed: Contact Guard Assist    BED MOBILITY  Supine to Sit : Supervision (cues for logroll)  Sit to Supine (OT):  Supervision (cues for logroll)    BALANCE ASSESSMENT     FUNCTIONAL ADL ASSESSMENT  UB Dressing Seated: Modified Independent (simulated, sufficient reach BUE without increase in abdominal pain)  LB Dressing Seated: Moderate Assist (noted decreased reach for LB dressing to knees; introduced AE for LB dressing, to further address future session; patient reports family also able to assist if needed)  LB Dressing Standing: Contact Guard Assist (simulated clothing management to/from waist)  Toileting Standing: Contact Guard Assist (simulated clothing management to/from waist)      ACTIVITY TOLERANCE:                          O2 SATURATIONS       COGNITION  Overall Cognitive Status:  WFL - within functional limits    Upper Extremity   ROM: within functional limits   Strength: within functional limits     EDUCATION PROVIDED  Patient: Role of Occupational Therapy; Functional Transfer Techniques; Adaptive Equipment Recommendations; Fall Prevention; Surgical Precautions; Compensatory ADL Techniques; Proper Body Mechanics; Abdominal Protective Strategies  Patient's Response to Education: Verbalized Understanding; Requires Further Education (mother also present)    Equipment used: RW  Demonstrates functional use, Would benefit from additional trial      Therapist comments: Patient received in supine, agreeable to therapy with encouragement. Educated on abdominal protection strategies and incorporation into ADLs, followed with fair plus return demo this session, will benefit from reinforcement including logroll technique. Patient tolerated standing with RW and CGA, functional mobility to door and back to edge of bed via RW and CGA with increased time; patient not agreeable to sitting up in chair at this time; encouraged patient to be up with nursing staff again later in the day. Will continue to follow.     Patient End of Session: Needs met;Call light within reach;In bed;RN aware of session/findings;All patient questions and  concerns addressed;SCDs in place;Family present    OCCUPATIONAL PROFILE    HOME SITUATION  Type of Home: House  Home Layout: One level  Lives With: Alone (will stay with parents in their 2-level house at discharge)    Toilet and Equipment: Standard height toilet;Comfort height toilet (parents have STS, patient has CHT, both report counter/sink adjacent)  Shower/Tub and Equipment: Tub-shower combo;Walk-in shower (both have tub, parents also have WIS)  Other Equipment: None    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 Reports stayed with his parents and his mother did help him with LB dressing to knees after initial abdominal surgery in Feb, but has since regained independence in all ADLs and returned back to his own home. Plans to stay with parents again at discharge.    SUBJECTIVE   \"Are you coming back tomorrow?\"    PAIN ASSESSMENT  Ratin  Location: abdomen  Management Techniques: Body mechanics;Relaxation;Repositioning;Activity promotion;Nurse notified    OBJECTIVE  Precautions: Drain(s);Bed/chair alarm (wound vac)  Fall Risk: High fall risk      ASSESSMENTS    AM-PAC ‘6-Clicks’ Inpatient Daily Activity Short Form  -   Putting on and taking off regular lower body clothing?: A Lot  -   Bathing (including washing, rinsing, drying)?: A Little  -   Toileting, which includes using toilet, bedpan or urinal? : A Little  -   Putting on and taking off regular upper body clothing?: None  -   Taking care of personal grooming such as brushing teeth?: None  -   Eating meals?: None    AM-PAC Score:  Score: 20  Approx Degree of Impairment: 38.32%  Standardized Score (AM-PAC Scale): 42.03    ADDITIONAL TESTS     NEUROLOGICAL FINDINGS      COGNITION ASSESSMENTS       PLAN  OT Treatment Plan: Balance activities;ADL training;Functional  transfer training;Endurance training;Patient/Family training;Patient/Family education;Compensatory technique education  Rehab Potential : Good  Frequency: 3x/week  Number of Visits to Meet Established Goals: 2    ADL GOALS   Patient will perform Lower Body Dressing with supervision with AE as needed  Patient will perform Toileting with supervision    FUNCTIONAL TRANSFER GOALS   Patient will transfer Supine to Sit via logroll with Mod I  Patient will transfer Sit to Supine via logroll with Mod I  Patient will transfer to Toilet with supervision    ADDITIONAL GOALS  Patient will follow abdominal protection strategies during ADLs/transfers with min cueing    Patient Evaluation Complexity Level:   Occupational Profile/Medical History LOW - Brief history including review of medical or therapy records    Specific performance deficits impacting engagement in ADL/IADL LOW  1 - 3 performance deficits    Client Assessment/Performance Deficits LOW - No comorbidities nor modifications of tasks    Clinical Decision Making LOW - Analysis of occupational profile, problem-focused assessments, limited treatment options    Overall Complexity LOW     OT Session Time: 23 minutes  Therapeutic Activity: 8 minutes

## 2024-06-27 NOTE — PROGRESS NOTES
Alert and Oriented x 4     VSS  Afebrile   O2 Sat WNL on RA  Denies nausea or emesis.  Taking clear liquids without problem.  C/O pain and medicated per orders with fairly good relief.  Note abdomen with 4 lap sites clean, dry and intact with skin glue and open to air.  Right MELIA drain intact draining red serosanguinous drainage.  Midline incision clean, dry and intact. Wound Vac in place with good seal.  Sargent catheter removed per orders.  Note red bloody urine in bag and Surgery aware. Patient tolerated it well.    Due to void.

## 2024-06-27 NOTE — DIETARY NOTE
[facility]   part of [facility]    NUTRITION ASSESSMENT    Pt does not meet malnutrition criteria at this time.    NUTRITION INTERVENTION:    Meal and Snacks - Monitor and encourage adequate PO intake.   Medical Food Supplements -  Per ERAS . Rationale/use for oral supplements discussed.    PATIENT STATUS: 06/27/24 39 year old male who presented for robotic colostomy reversal, extensive lysis of adhesions. Chart reviewed for +MST wt loss. Pt admits he was eating okay. Wt loss likely due to new colostomy and change in eating habits. Currently on clears. No concerns chewing or swallowing.     Underwent emergent low anterior resection with creation of end colostomy perforated diverticulitis on 2/17/2024     PMHx diabetes and Crohn's disease    ANTHROPOMETRICS:  Ht: 188 cm (6' 2\")  Wt: 103 kg (227 lb).   BMI: Body mass index is 29.15 kg/m².  IBW: 86.4 kg      WEIGHT HISTORY:   Weight loss: Yes, Severe Wt loss of 34 lbs, 13%, over 5 months     Wt Readings from Last 10 Encounters:   06/26/24 103 kg (227 lb)   06/25/24 104.3 kg (230 lb)   02/28/24 118.8 kg (261 lb 12.8 oz)   02/21/24 131.2 kg (289 lb 3.9 oz)   09/27/23 123.1 kg (271 lb 6.4 oz)   06/01/23 119.3 kg (263 lb)   04/19/22 119.7 kg (263 lb 12.8 oz)   03/29/22 118.8 kg (262 lb)   12/27/18 122.5 kg (270 lb)   09/10/14 131.5 kg (290 lb)        NUTRITION:  Diet:       Procedures    Clear liquid diet Is Patient on Accuchecks? Yes; Misc Restriction: ERAS, No Straw, No carbonated beverages      Food Allergies: No  Cultural/Ethnic/Buddhism Preferences Addressed: Yes    Percent Meals Eaten (last 3 days)       None            GI system review: Post op Last BM: PTA  Skin and wounds: intact    NUTRITION RELATED PHYSICAL FINDINGS:     1. Body Fat/Muscle Mass: no wasting noted     2. Fluid Accumulation: none per RN documentation     NUTRITION PRESCRIPTION: 103kg  Calories: 7629-8264 calories/day (20-25 kcal/kg)  Protein: 123-154- grams protein/day (1.2-1.5 grams  protein per kg)  Fluid: ~1 ml/kcal or per MD discretion    NUTRITION DIAGNOSIS/PROBLEM:  Unintentional weight loss related to altered GI function/GI disorder as evidenced by documented/reported unintentional weight loss      MONITOR AND EVALUATE/NUTRITION GOALS:  PO intake of 75% of meals TID - New  PO intake of 75% of oral nutrition supplement/s - New  Weight stable within 1 to 2 lbs during admission - New      MEDICATIONS:  Reviewed    LABS:  Reviewed    Pt is at Moderate nutrition risk    Erica Nieto RD, LDN  Clinical Nutrition  [id number]

## 2024-06-27 NOTE — PROGRESS NOTES
[facility]  Progress Note    Trino Reddy Patient Status:  Inpatient    1985 MRN [id number]   Location [facility] 3NW-A Attending Jaylan Hernandez MD   Hosp Day # 1 PCP Johnnie Mancilla MD     Subjective:  The patient is doing well today.  He is resting comfortably in bed.  He reports intermittent episodes of abdominal pain.  Pain is abdominal spasms.  He denies nausea or vomiting.  He is tolerating clear liquids well.  He denies flatus.  He denies a bowel movement.  He denies fever or chills.  Sargent is in place.    Objective/Physical Exam:  General: Alert, orientated x3.  Cooperative.  No apparent distress.  Vital Signs:  Blood pressure 111/62, pulse 73, temperature 97.8 °F (36.6 °C), temperature source Oral, resp. rate 19, height 74\", weight 227 lb (103 kg), SpO2 96%.  Wt Readings from Last 3 Encounters:   24 227 lb (103 kg)   24 230 lb (104.3 kg)   24 261 lb 12.8 oz (118.8 kg)     Lungs: No respiratory distress.  Cardiac: Regular rate and rhythm.   Abdomen:  Soft, mildly distended, incisional tenderness, with no rebound or guarding.  No peritoneal signs.   Extremities:  No lower extremity edema noted.    Incision: Clean, dry, intact, no erythema  Drain: With serosanguineous output  Intake/Output:    Intake/Output Summary (Last 24 hours) at 2024 0714  Last data filed at 2024 0545  Gross per 24 hour   Intake 3000 ml   Output 890 ml   Net 2110 ml     I/O last 3 completed shifts:  In: 3000 [I.V.:2700; IV PIGGYBACK:300]  Out: 890 [Urine:665; Drains:175; Blood:50]  No intake/output data recorded.    Medications:    acetaminophen  1,000 mg Oral Q8H    metRONIDAZOLE  500 mg Intravenous Q12H    heparin  5,000 Units Subcutaneous Q8H [place]    famotidine  20 mg Oral BID    Or    famotidine  20 mg Intravenous BID    magnesium oxide  400 mg Oral Daily    ketorolac  15 mg Intravenous Q6H    cefTRIAXone  2 g Intravenous Q24H    insulin aspart  1-68 Units Subcutaneous TID CC     insulin aspart  1-30 Units Subcutaneous TID AC and HS       Labs:  Lab Results   Component Value Date    WBC 9.8 06/27/2024    HGB 12.8 06/27/2024    HCT 38.1 06/27/2024    .0 06/27/2024     Lab Results   Component Value Date     06/27/2024    K 3.8 06/27/2024     06/27/2024    CO2 22.0 06/27/2024    BUN 10 06/27/2024    CREATSERUM 0.93 06/27/2024    GLU 94 06/27/2024    CA 8.3 06/27/2024     Lab Results   Component Value Date    INR 1.04 03/01/2024    INR 1.19 02/23/2024    INR 1.34 (H) 02/18/2024         Assessment  Patient Active Problem List   Diagnosis    IBD (inflammatory bowel disease)    Depression    Anxiety    Class 2 obesity    Type 2 diabetes mellitus without complication, without long-term current use of insulin (HCC)    Tobacco dependence    History of tobacco use disorder    Hypokalemia    Leukocytosis    Hyperglycemia    Diverticulitis of colon with perforation    Acute left flank pain    Bowel perforation (HCC)    Acute hypoxic respiratory failure (HCC)    Retroperitoneal abscess (HCC)    Diverticulitis    History of colostomy reversal    Type 2 diabetes mellitus without complication, with long-term current use of insulin (HCC)       Postop day 1 robotic colostomy reversal, extensive lysis of adhesions    Plan:  Continue clear liquid diet.  Remove Sargent.  Continue multimodal pain control.  Ambulate and up to chair  DVT prophylaxis with heparin  GI prophylaxis with Pepcid    Quality:  DVT Mechanical Prophylaxis:   SCDs, Early ambuation  DVT Pharmacologic Prophylaxis   Medication    heparin (Porcine) 5000 UNIT/ML injection 5,000 Units                Code Status: Full Code  Sargent: Sargent catheter in place  Sargent Duration (in days): 2  Central line:    BRANDY:         Mirna San PA-C  6/27/2024  7:27 AM    POD 1-overall patient is doing well-no nausea or vomiting.  Tolerating clear liquids.  Passed a small amount of flatus but no formed stool.  Abdomen-soft, somewhat distended on  postop day 1 as expected, incision is clean and dry.  MELIA drain with serosanguineous output  Continue clear liquids until patient has more evidence of bowel function.  Labs noted.  WBC within normal limits, hemoglobin 12.8  Encourage ambulation.  Continue DVT and GI prophylaxis  I agree with the note above and attest to its accuracy with the following changes below after my interview and examination of the patient    The patient was seen and examined.  Available labs and radiology is noted.    Yanira Oglesby MD FACS    Please note that this report has been produced using speech recognition software and may contain errors related to that system including but not limited to errors in grammar, punctuation and spelling as well as words and phrases that possibly may have been recognized inappropriately.  If there are any questions or concerns please contact the dictating provider for clarification.    The 21st Century Cures Act makes medical notes like these available to patients in the interest of trans parency. Please be advised this is a medical document. Medical documents are intended to carry relevant information, facts as evident, and the clinical opinion of the practitioner. The medical note is intended as peer to peer communication and may appear blunt or direct. It is written in medical language and may contain abbreviations or verbiage that are unfamiliar.   Again if there are any questions or concerns please contact the dictating provider for clarification.

## 2024-06-27 NOTE — PHYSICAL THERAPY NOTE
PHYSICAL THERAPY EVALUATION - INPATIENT     Room Number: 312/312-A  Evaluation Date: 6/27/2024  Type of Evaluation: Initial  Physician Order: PT Eval and Treat    Presenting Problem: s/p robotic colostomy reversal and LOBO 6/26/24  Co-Morbidities : s/p open low anterior colon resection, creation of end colostomy, and drainage of abscess 2/17/24, DM, Crohns disease  Reason for Therapy: Mobility Dysfunction and Discharge Planning    PHYSICAL THERAPY ASSESSMENT   Patient is currently functioning below baseline with bed mobility, transfers, and gait.  Prior to admission, patient's baseline is independent.  Patient is requiring contact guard assist as a result of the following impairments: pain, decreased muscular endurance, and medical status.  Physical Therapy will continue to follow for duration of hospitalization.    Patient will benefit from continued skilled PT Services For duration of hospitalization, however, given the patient is functioning near baseline level do not anticipate skilled therapy needs at discharge .    PLAN  PT Treatment Plan: Bed mobility;Endurance;Energy conservation;Patient education;Gait training;Strengthening;Balance training;Transfer training;Stair training  Rehab Potential : Good  Frequency (Obs): 3x/week  Number of Visits to Meet Established Goals: 3      CURRENT GOALS    Goal #1 Patient is able to demonstrate supine - sit EOB @ level: supervision     Goal #2 Patient is able to demonstrate transfers EOB to/from BSC at assistance level: supervision     Goal #3 Patient is able to ambulate 100 feet with assist device: least restrictive at assistance level: supervision     Goal #4 Pt will ascend/descend 1 flight of stairs with supervision   Goal #5    Goal #6    Goal Comments: Goals established on 6/27/2024      PHYSICAL THERAPY MEDICAL/SOCIAL HISTORY  History related to current admission: Patient is a 39 year old male admitted on 6/26/2024 from home for elective robotic colostomy  reversal and extensive LOBO.       HOME SITUATION  Type of Home: House   Home Layout: Two level                Lives With: Alone  Drives: Yes  Patient Owned Equipment: Rolling walker       Prior Level of [place]: Pt typically lives alone in single story home. Pt will be staying in his parents 2 story home for his recovery. Pt was not ambulating with RW or cane prior to surgery but used one during his last recovery period. 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 Pt enjoys playing the [employer].     SUBJECTIVE  \"The last recovery was really rough.\"       OBJECTIVE  Precautions: Drain(s);Bed/chair alarm (wound vac)  Fall Risk: High fall risk    WEIGHT BEARING RESTRICTION  Weight Bearing Restriction: None                PAIN ASSESSMENT  Ratin  Location: abdomen  Management Techniques: Activity promotion;Repositioning    COGNITION  Overall Cognitive Status:  WFL - within functional limits    RANGE OF MOTION AND STRENGTH ASSESSMENT  Upper extremity ROM and strength are within functional limits     Lower extremity ROM is within functional limits     Lower extremity strength is within functional limits       BALANCE  Static Sitting: Good  Dynamic Sitting: Good  Static Standing: Fair -  Dynamic Standing: Fair -    ADDITIONAL TESTS                                    ACTIVITY TOLERANCE                         O2 WALK       NEUROLOGICAL FINDINGS                        AM-PAC '6-Clicks' INPATIENT SHORT FORM - BASIC MOBILITY  How much difficulty does the patient currently have...  Patient Difficulty: Turning over in bed (including adjusting bedclothes, sheets and blankets)?: A Little   Patient Difficulty: Sitting down on and standing up from a chair with arms (e.g., wheelchair, bedside commode, etc.): A Little   Patient Difficulty: Moving from lying on back to sitting on the side of the bed?: A Little   How much help from another person does the patient currently need...   Help from Another:  Moving to and from a bed to a chair (including a wheelchair)?: A Little   Help from Another: Need to walk in hospital room?: A Little   Help from Another: Climbing 3-5 steps with a railing?: A Little       AM-PAC Score:  Raw Score: 18   Approx Degree of Impairment: 46.58%   Standardized Score (AM-PAC Scale): 43.63   CMS Modifier (G-Code): CK    FUNCTIONAL ABILITY STATUS  Gait Assessment   Functional Mobility/Gait Assessment  Gait Assistance: Contact guard assist  Distance (ft): 5  Assistive Device: Rolling walker    Skilled Therapy Provided   Supervision for rolling R.   MIN assist for trunk support with supine-sit.   VC for transfer set up with RW.   Sit-stand with RW and CGA for safety.   Pt transferred to bedside chair with RW and CGA for safety.   VC for sequencing and safety.   Returned to sitting up in bedside chair. Distance limited by pain.   RN present to provide pain medication.     Bed Mobility:  Rolling: supervision  Supine to sit: MIN   Sit to supine: NT     Transfer Mobility:  Sit to stand: CGA   Stand to sit: CGA  Gait = CGA     Therapist's Comments:  Reviewed activity recommendations. Reviewed log roll and abdominal brace to minimize pain.     Exercise/Education Provided:  Bed mobility  Energy conservation  Functional activity tolerated  Gait training  Posture  Strengthening  Transfer training    Patient End of Session: Up in chair;With  staff;Needs met;Call light within reach;All patient questions and concerns addressed;Alarm set      Patient Evaluation Complexity Level:  History Moderate - 1 or 2 personal factors and/or co-morbidities   Examination of body systems Low - addressing 1-2 elements   Clinical Presentation Low - Stable   Clinical Decision Making Low - Stable       PT Session Time: 20 minutes  Gait Training:  minutes  Therapeutic Activity: 10 minutes  Neuromuscular Re-education:  minutes  Therapeutic Exercise:  minutes

## 2024-06-27 NOTE — PLAN OF CARE
Pt alert x 4. VSS on RA. Medicated for pain. Tolerating CLD. Abdominal incisions intact, wound vac to left side. RLQ MELIA drain.  Sargent in place with antonio urine. IVF with abx infusing. POC discussed. All current needs met. Call light in reach.

## 2024-06-28 LAB
ANION GAP SERPL CALC-SCNC: 7 MMOL/L (ref 0–18)
BASOPHILS # BLD AUTO: 0.03 X10(3) UL (ref 0–0.2)
BASOPHILS NFR BLD AUTO: 0.4 %
BUN BLD-MCNC: 7 MG/DL (ref 9–23)
CALCIUM BLD-MCNC: 8.6 MG/DL (ref 8.5–10.1)
CHLORIDE SERPL-SCNC: 109 MMOL/L (ref 98–112)
CO2 SERPL-SCNC: 21 MMOL/L (ref 21–32)
CREAT BLD-MCNC: 0.72 MG/DL
EGFRCR SERPLBLD CKD-EPI 2021: 119 ML/MIN/1.73M2 (ref 60–?)
EOSINOPHIL # BLD AUTO: 0.26 X10(3) UL (ref 0–0.7)
EOSINOPHIL NFR BLD AUTO: 3.4 %
ERYTHROCYTE [DISTWIDTH] IN BLOOD BY AUTOMATED COUNT: 13.3 %
GLUCOSE BLD-MCNC: 105 MG/DL (ref 70–99)
GLUCOSE BLD-MCNC: 70 MG/DL (ref 70–99)
GLUCOSE BLD-MCNC: 76 MG/DL (ref 70–99)
GLUCOSE BLD-MCNC: 86 MG/DL (ref 70–99)
GLUCOSE BLD-MCNC: 97 MG/DL (ref 70–99)
HCT VFR BLD AUTO: 38.3 %
HGB BLD-MCNC: 12.4 G/DL
IMM GRANULOCYTES # BLD AUTO: 0.02 X10(3) UL (ref 0–1)
IMM GRANULOCYTES NFR BLD: 0.3 %
LYMPHOCYTES # BLD AUTO: 1.96 X10(3) UL (ref 1–4)
LYMPHOCYTES NFR BLD AUTO: 25.7 %
MAGNESIUM SERPL-MCNC: 2 MG/DL (ref 1.6–2.6)
MCH RBC QN AUTO: 29.5 PG (ref 26–34)
MCHC RBC AUTO-ENTMCNC: 32.4 G/DL (ref 31–37)
MCV RBC AUTO: 91.2 FL
MONOCYTES # BLD AUTO: 0.78 X10(3) UL (ref 0.1–1)
MONOCYTES NFR BLD AUTO: 10.2 %
NEUTROPHILS # BLD AUTO: 4.59 X10 (3) UL (ref 1.5–7.7)
NEUTROPHILS # BLD AUTO: 4.59 X10(3) UL (ref 1.5–7.7)
NEUTROPHILS NFR BLD AUTO: 60 %
OSMOLALITY SERPL CALC.SUM OF ELEC: 281 MOSM/KG (ref 275–295)
PLATELET # BLD AUTO: 200 10(3)UL (ref 150–450)
POTASSIUM SERPL-SCNC: 3.8 MMOL/L (ref 3.5–5.1)
RBC # BLD AUTO: 4.2 X10(6)UL
SODIUM SERPL-SCNC: 137 MMOL/L (ref 136–145)
WBC # BLD AUTO: 7.6 X10(3) UL (ref 4–11)

## 2024-06-28 PROCEDURE — 99232 SBSQ HOSP IP/OBS MODERATE 35: CPT | Performed by: STUDENT IN AN ORGANIZED HEALTH CARE EDUCATION/TRAINING PROGRAM

## 2024-06-28 RX ORDER — TRAZODONE HYDROCHLORIDE 50 MG/1
50 TABLET ORAL NIGHTLY
Qty: 90 TABLET | Refills: 0 | Status: SHIPPED | OUTPATIENT
Start: 2024-06-28

## 2024-06-28 RX ORDER — DEXTROSE MONOHYDRATE AND SODIUM CHLORIDE 5; .9 G/100ML; G/100ML
INJECTION, SOLUTION INTRAVENOUS CONTINUOUS
Status: DISCONTINUED | OUTPATIENT
Start: 2024-06-28 | End: 2024-06-30

## 2024-06-28 NOTE — PROGRESS NOTES
Pt reports 5/10 abdominal pain this am. Toradol administered. Warm packs to abdomen per surgery PA. Encouraged ambulation. Denies passing flatus. Abdomen is soft. Bowel sounds are hypoactive. Lap sites with skin glue CDI. MELIA to right abdomen leaking at site. Drain stripped. Dressing removed and cleansed abdomen with CHG. Split gauze dressing to drain site. Drain with serosanguinous output. Wound vac to old ostomy site. IVF infusing. Voiding without difficulty. Plan of care reviewed. All questions addressed.     1300 Pt is passing flatus and small amount of stool. Notified surgery PA Gaby. Ok to advance diet to fulls if he continues to pass flatus.

## 2024-06-28 NOTE — PROGRESS NOTES
[facility]  Progress Note    Trino Reddy Patient Status:  Inpatient    1985 MRN [id number]   Location [facility] 3NW-A Attending Jaylan Hernandez MD   Hosp Day # 2 PCP Johnnie Mancilla MD     Subjective:  He is seen and examined resting in bed. He reports he did not sleep well overnight. He reports gas pain today. He denies passing flatus or having a bowel movement. He denies urinary symptoms. He reports tolerating a clear liquid diet without nausea or vomiting. He denies fever or chills. He reports he has been ambulating to the bathroom, but has not ambulated around the unit. MELIA drain with 190 c c of output.     Vital signs stable. Hemoglobin slightly decreased but stable 12.8 --> 12.4.   Objective/Physical Exam:  /68 (BP Location: Left arm)   Pulse 70   Temp 97.9 °F (36.6 °C) (Oral)   Resp 16   Ht 74\"   Wt 227 lb   SpO2 96%   BMI 29.15 kg/m²     Intake/Output Summary (Last 24 hours) at 2024 1029  Last data filed at 2024 0927  Gross per 24 hour   Intake 410 ml   Output 1615 ml   Net -1205 ml         General: Awake, Alert,  Cooperative.  No apparent distress.  Pulm: no respiratory distress, no increased work of breathing  Abdomen:  Soft, mildly distended with appropriate incisional tenderness, with no rebound or guarding.  No peritoneal signs.  Incision:  Clean, dry, intact without erythema.  Prevena in place to previous ostomy site.   Drains: MELIA drain in place with serosanguinous output.    Labs:  Lab Results   Component Value Date    WBC 7.6 2024    HGB 12.4 2024    HCT 38.3 2024    .0 2024      Lab Results   Component Value Date    INR 1.04 2024    INR 1.19 2024    INR 1.34 (H) 2024     Lab Results   Component Value Date     2024    K 3.8 2024     2024    CO2 21.0 2024    BUN 7 2024    CREATSERUM 0.72 2024    GLU 76 2024    CA 8.6 2024             Assessment:  Patient Active Problem List   Diagnosis    IBD (inflammatory bowel disease)    Depression    Anxiety    Class 2 obesity    Type 2 diabetes mellitus without complication, without long-term current use of insulin (HCC)    Tobacco dependence    History of tobacco use disorder    Hypokalemia    Leukocytosis    Hyperglycemia    Diverticulitis of colon with perforation    Acute left flank pain    Bowel perforation (HCC)    Acute hypoxic respiratory failure (HCC)    Retroperitoneal abscess (HCC)    Diverticulitis    History of colostomy reversal    Type 2 diabetes mellitus without complication, with long-term current use of insulin (HCC)       Postop day 2 robotic colostomy reversal, extensive lysis of adhesions     Plan:  Continue clear liquid diet. Await return of bowel function prior to advancement of diet.  Analgesics and antiemetics as needed  Encourage ambulation and up to chair as able  Encourage incentive spirometry  Continue drain care  DVT prophylaxis with heparin  GI prophylaxis with pepcid     Estephania Turner PA-C  6/28/2024  10:29 AM

## 2024-06-28 NOTE — PROGRESS NOTES
[facility]   part of [facility]     Hospitalist Progress Note     Trino Reddy Patient Status:  Inpatient    1985 MRN [id number]   Location [facility] 3NW-A Attending Jaylan Hernandez MD   Hosp Day # 2 PCP Johnnie Mancilla MD     Chief Complaint: Med mgmt    Subjective:     Patient resting in bed on reports he is tolerating clear liquid diet with no nausea.  He has not passed gas or had a bowel movement yet    Objective:    Review of Systems:   A comprehensive review of systems was completed; pertinent positive and negatives stated in subjective.    Vital signs:  Temp:  [97.4 °F (36.3 °C)-97.9 °F (36.6 °C)] 97.9 °F (36.6 °C)  Pulse:  [65-81] 70  Resp:  [16-18] 16  BP: (108-120)/(62-75) 120/68  SpO2:  [96 %-100 %] 96 %    Physical Exam:    General: No acute distress  Respiratory: No wheezes, no rhonchi  Cardiovascular: S1, S2, regular rate and rhythm  Abdomen: Soft, Non-tender, non-distended, positive bowel sounds  Neuro: No new focal deficits.   Extremities: No edema    Diagnostic Data:    Labs:  Recent Labs   Lab 24  0540 24  0628   WBC 9.8 7.6   HGB 12.8* 12.4*   MCV 86.6 91.2   .0 200.0       Recent Labs   Lab 24  0540 24  0628   GLU 94 76   BUN 10 7*   CREATSERUM 0.93 0.72   CA 8.3* 8.6    137   K 3.8 3.8    109   CO2 22.0 21.0       Estimated Creatinine Clearance: 160.2 mL/min (based on SCr of 0.72 mg/dL).    No results for input(s): \"TROP\", \"TROPHS\", \"CK\" in the last 168 hours.    No results for input(s): \"PTP\", \"INR\" in the last 168 hours.               Microbiology    No results found for this visit on 24.      Imaging: Reviewed in Epic.    Medications:    acetaminophen  1,000 mg Oral Q8H    cyclobenzaprine  10 mg Oral TID    magnesium oxide  400 mg Oral Once    heparin  5,000 Units Subcutaneous Q8H [place]    famotidine  20 mg Oral BID    magnesium oxide  400 mg Oral Daily    ketorolac  15 mg Intravenous Q6H    insulin aspart  1-68  Units Subcutaneous TID CC    insulin aspart  1-30 Units Subcutaneous TID AC and HS       Assessment & Plan:      #S/p colostomy reversal  #S/p bilateral ureteral stent placement  -CLD until bowel function returns and then plan to advance diet  -D5NS until diet is advanced  -Management per surgery  -Urology following     #Diabetes  -Hyperglycemia protocol     #Crohn's disease      Mai Florentino, DO    Supplementary Documentation:     Quality:  DVT Mechanical Prophylaxis:   SCDs, Early ambuation  DVT Pharmacologic Prophylaxis   Medication    heparin (Porcine) 5000 UNIT/ML injection 5,000 Units                Code Status: Full Code  Sargent: No urinary catheter in place  Sargent Duration (in days): 2  Central line:    BRANDY:     The 21st Century Cures Act makes medical notes like these available to patients in the interest of transparency. Please be advised this is a medical document. Medical documents are intended to carry relevant information, facts as evident, and the clinical opinion of the practitioner. The medical note is intended as peer to peer communication and may appear blunt or direct. It is written in medical language and may contain abbreviations or verbiage that are unfamiliar.

## 2024-06-28 NOTE — TELEPHONE ENCOUNTER
Please Review. Protocol Failed; No Protocol     Requested Prescriptions   Pending Prescriptions Disp Refills    TRAZODONE 50 MG Oral Tab [Pharmacy Med Name: TRAZODONE 50MG TABLETS] 90 tablet 0     Sig: TAKE 1 TABLET(50 MG) BY MOUTH EVERY NIGHT       There is no refill protocol information for this order              Recent Outpatient Visits              1 month ago Encounter for ostomy care education    [facility]    Office Visit    2 months ago Perforated diverticulum    [facility], Lillian Spence Jeremy, MD    Office Visit    3 months ago Status post Branden procedure ([facility])    [facility], Lillian Spence Jeremy, MD    Office Visit    3 months ago Encounter for ostomy care education    [facility]    Office Visit    3 months ago Status post Branden procedure ([facility])    [facility], Lillian Spence Mykala, PA-C    Office Visit

## 2024-06-28 NOTE — PLAN OF CARE
Pt alert x 4, VSS on RA. Medicated for pain. Tolerating diet,  no nausea. QID accuchecks. IVF with abx infusing abdominal incisions intact. Wound vac in place. POC discussed. All current needs met. Call light in reach

## 2024-06-29 LAB
GLUCOSE BLD-MCNC: 84 MG/DL (ref 70–99)
GLUCOSE BLD-MCNC: 88 MG/DL (ref 70–99)
GLUCOSE BLD-MCNC: 96 MG/DL (ref 70–99)
GLUCOSE BLD-MCNC: 96 MG/DL (ref 70–99)

## 2024-06-29 NOTE — PROGRESS NOTES
A&Ox4. VSS. RA.   GI: Abdomen soft, nondistended. Passing gas.  Denies nausea.  : Voids.  Pain controlled with PRN Oxycodone.   Up with standby assist.  Drains: R MELIA w/ serosanguinous drainage. Wound vac to old ostomy site.    Incisions: Lap sites x3 w/ skin glue- C/D/I   Diet: Tolerating full liquid diet  IVF running per order.  All appropriate safety measures in place. All questions and concerns addressed.

## 2024-06-29 NOTE — PROGRESS NOTES
[facility]  Progress Note    Trino Reddy Patient Status:  Inpatient    1985 MRN [id number]   Location [facility] 3NW-A Attending Jaylan Hernandez MD   Hosp Day # 3 PCP Johnnie Mancilla MD     Subjective:  He  is seen and examined resting in bed. He reports feeling okay today. He reports passing flatus yesterday and was started on full liquid diet. He reports tolerating diet without nausea or vomiting. He denies dysuria. He denies fever or chills. He reports ambulating today.     Objective/Physical Exam:  /72 (BP Location: Left arm)   Pulse 78   Temp 97.2 °F (36.2 °C) (Oral)   Resp 18   Ht 74\"   Wt 227 lb   SpO2 100%   BMI 29.15 kg/m²     Intake/Output Summary (Last 24 hours) at 2024 1308  Last data filed at 2024 1136  Gross per 24 hour   Intake 1170 ml   Output 345 ml   Net 825 ml         General: Awake, Alert,  Cooperative.  No apparent distress.  Pulm: no respiratory distress, no increased work of breathing  Abdomen:  Soft, non-distended,appropriate incisional tenderness, with no rebound or guarding.  No peritoneal signs. Wound vac in place to previous ostomy site  Incision:  Clean, dry, intact without erythema.    Drains: Brandon drain in place with serosanguinous output    Labs:      Lab Results   Component Value Date    INR 1.04 2024    INR 1.19 2024    INR 1.34 (H) 2024               Assessment:  Patient Active Problem List   Diagnosis    IBD (inflammatory bowel disease)    Depression    Anxiety    Class 2 obesity    Type 2 diabetes mellitus without complication, without long-term current use of insulin (HCC)    Tobacco dependence    History of tobacco use disorder    Hypokalemia    Leukocytosis    Hyperglycemia    Diverticulitis of colon with perforation    Acute left flank pain    Bowel perforation (HCC)    Acute hypoxic respiratory failure (HCC)    Retroperitoneal abscess (HCC)    Diverticulitis    History of colostomy reversal    Type 2 diabetes  mellitus without complication, with long-term current use of insulin (HCC)       Postop day 3 robotic colostomy reversal, extensive lysis of adhesions     Plan:  Continue Full liquid diet. Await return of bowel function prior to advancement of diet.  Analgesics and antiemetics as needed  Encourage ambulation and up to chair as able  Encourage incentive spirometry  Continue drain care  DVT prophylaxis with heparin  GI prophylaxis with pepcid     Estepahnia Turner PA-C  6/29/2024  1:08 PM    Addendum:    I have seen and examined the patient; I obtained and performed the above history, physical examination, assessment and plan.  I have I have edited the above to reflect my evaluation, opinion, and physical exam findings.    Exam and plan as above.    Data reviewed.  Care plan discussed    I, Dr. Americo Dickson, personally performed the services described in this documentation  by Ms Johnny PA-C, and they are both accurate and complete.    Americo Dickson MD FACS  [facility] General Surgery

## 2024-06-29 NOTE — PROGRESS NOTES
[facility]   part of [facility]     Hospitalist Progress Note     Trino Reddy Patient Status:  Inpatient    1985 MRN [id number]   Location [facility] 3NW-A Attending Jaylan Hernandez MD   Hosp Day # 3 PCP Johnnie Mancilla MD     Chief Complaint: Med mgmt    Subjective:     Patient resting in chair and tolerating full liquid diet.  He has not had a bowel movement yet    Objective:    Review of Systems:   A comprehensive review of systems was completed; pertinent positive and negatives stated in subjective.    Vital signs:  Temp:  [97.6 °F (36.4 °C)-98.8 °F (37.1 °C)] 97.8 °F (36.6 °C)  Pulse:  [54-73] 70  Resp:  [16-18] 18  BP: (112-128)/(72-78) 112/76  SpO2:  [91 %-100 %] 95 %    Physical Exam:    General: No acute distress  Respiratory: No wheezes, no rhonchi  Cardiovascular: S1, S2, regular rate and rhythm  Abdomen: Soft, Non-tender, non-distended, positive bowel sounds  Neuro: No new focal deficits.   Extremities: No edema    Diagnostic Data:    Labs:  Recent Labs   Lab 24  0540 24  0628   WBC 9.8 7.6   HGB 12.8* 12.4*   MCV 86.6 91.2   .0 200.0       Recent Labs   Lab 24  0540 24  0628   GLU 94 76   BUN 10 7*   CREATSERUM 0.93 0.72   CA 8.3* 8.6    137   K 3.8 3.8    109   CO2 22.0 21.0       Estimated Creatinine Clearance: 160.2 mL/min (based on SCr of 0.72 mg/dL).    No results for input(s): \"TROP\", \"TROPHS\", \"CK\" in the last 168 hours.    No results for input(s): \"PTP\", \"INR\" in the last 168 hours.               Microbiology    No results found for this visit on 24.      Imaging: Reviewed in Epic.    Medications:    acetaminophen  1,000 mg Oral Q8H    cyclobenzaprine  10 mg Oral TID    magnesium oxide  400 mg Oral Once    heparin  5,000 Units Subcutaneous Q8H [place]    famotidine  20 mg Oral BID    magnesium oxide  400 mg Oral Daily    insulin aspart  1-68 Units Subcutaneous TID CC    insulin aspart  1-30 Units Subcutaneous TID AC and  HS       Assessment & Plan:      #S/p colostomy reversal  #S/p bilateral ureteral stent placement  -FLD  -D5NS until diet is advanced  -Management per surgery  -Urology following     #Diabetes  -Hyperglycemia protocol     #Crohn's disease      Mai Florentino DO    Supplementary Documentation:     Quality:  DVT Mechanical Prophylaxis:   SCDs, Early ambuation  DVT Pharmacologic Prophylaxis   Medication    heparin (Porcine) 5000 UNIT/ML injection 5,000 Units                Code Status: Full Code  Sargent: No urinary catheter in place  Sargent Duration (in days): 2  Central line:    BRANDY:     The 21st Century Cures Act makes medical notes like these available to patients in the interest of transparency. Please be advised this is a medical document. Medical documents are intended to carry relevant information, facts as evident, and the clinical opinion of the practitioner. The medical note is intended as peer to peer communication and may appear blunt or direct. It is written in medical language and may contain abbreviations or verbiage that are unfamiliar.

## 2024-06-29 NOTE — PLAN OF CARE
Alert and oriented x4. VSS on room air. Voiding without difficulty. Tolerating full liquid diet - passing gas, awaiting BM. Woundvac to old ostomy site intact. Right MELIA drain with serosanuinous output. Ambulates independently after set-up. Encouraged OOB and ambulation in halls. Patient updated on plan of care. All questions and concerns addressed at this time.

## 2024-06-30 VITALS
DIASTOLIC BLOOD PRESSURE: 86 MMHG | BODY MASS INDEX: 29.13 KG/M2 | OXYGEN SATURATION: 99 % | SYSTOLIC BLOOD PRESSURE: 129 MMHG | WEIGHT: 227 LBS | RESPIRATION RATE: 16 BRPM | HEART RATE: 64 BPM | TEMPERATURE: 98 F | HEIGHT: 74 IN

## 2024-06-30 LAB
GLUCOSE BLD-MCNC: 100 MG/DL (ref 70–99)
GLUCOSE BLD-MCNC: 105 MG/DL (ref 70–99)
GLUCOSE BLD-MCNC: 84 MG/DL (ref 70–99)

## 2024-06-30 RX ORDER — OXYCODONE HYDROCHLORIDE 5 MG/1
5 TABLET ORAL EVERY 4 HOURS PRN
Qty: 20 TABLET | Refills: 0 | Status: SHIPPED | OUTPATIENT
Start: 2024-06-30

## 2024-06-30 RX ORDER — NALOXONE HYDROCHLORIDE 4 MG/.1ML
4 SPRAY NASAL AS NEEDED
Qty: 1 KIT | Refills: 0 | Status: SHIPPED | OUTPATIENT
Start: 2024-06-30

## 2024-06-30 RX ORDER — DOCUSATE SODIUM 100 MG/1
100 CAPSULE, LIQUID FILLED ORAL 2 TIMES DAILY
Status: DISCONTINUED | OUTPATIENT
Start: 2024-06-30 | End: 2024-06-30

## 2024-06-30 NOTE — PLAN OF CARE
Alert and oriented x4. VSS on room air. Voiding without difficulty. Tolerating full liquid diet, advanced to soft after BM this morning. Woundvac to old ostomy site intact. Right MELIA drain with serosanuinous output. Lap sites x4 with skin glue ANDREW. Ambulates independently after set-up. Encouraged OOB and ambulation in halls. Patient updated on plan of care. All questions and concerns addressed at this time.

## 2024-06-30 NOTE — PROGRESS NOTES
[facility]   part of [facility]     Hospitalist Progress Note     Trino Reddy Patient Status:  Inpatient    1985 MRN [id number]   Location [facility] 3NW-A Attending Jaylan Hernandez MD   Hosp Day # 4 PCP Johnnie Mancilla MD     Chief Complaint: Med mgmt    Subjective:     Patient reports he still has not had a BM yet     Objective:    Review of Systems:   A comprehensive review of systems was completed; pertinent positive and negatives stated in subjective.    Vital signs:  Temp:  [97.2 °F (36.2 °C)-98 °F (36.7 °C)] 98 °F (36.7 °C)  Pulse:  [57-78] 57  Resp:  [18] 18  BP: (119-127)/(69-76) 119/76  SpO2:  [97 %-100 %] 97 %    Physical Exam:    General: No acute distress  Respiratory: No wheezes, no rhonchi  Cardiovascular: S1, S2, regular rate and rhythm  Abdomen: Soft, Non-tender, non-distended, positive bowel sounds  Neuro: No new focal deficits.   Extremities: No edema    Diagnostic Data:    Labs:  Recent Labs   Lab 24  0540 24  0628   WBC 9.8 7.6   HGB 12.8* 12.4*   MCV 86.6 91.2   .0 200.0       Recent Labs   Lab 24  0540 24  0628   GLU 94 76   BUN 10 7*   CREATSERUM 0.93 0.72   CA 8.3* 8.6    137   K 3.8 3.8    109   CO2 22.0 21.0       Estimated Creatinine Clearance: 160.2 mL/min (based on SCr of 0.72 mg/dL).    No results for input(s): \"TROP\", \"TROPHS\", \"CK\" in the last 168 hours.    No results for input(s): \"PTP\", \"INR\" in the last 168 hours.               Microbiology    No results found for this visit on 24.      Imaging: Reviewed in Epic.    Medications:    acetaminophen  1,000 mg Oral Q8H    cyclobenzaprine  10 mg Oral TID    magnesium oxide  400 mg Oral Once    heparin  5,000 Units Subcutaneous Q8H [place]    famotidine  20 mg Oral BID    magnesium oxide  400 mg Oral Daily    insulin aspart  1-30 Units Subcutaneous TID AC and HS       Assessment & Plan:      #S/p colostomy reversal  #S/p bilateral ureteral stent placement  -FLD;  plan to advance diet when bowel function returns   -D5NS until diet is advanced  -Management per surgery  -Urology following     #Diabetes  -Hyperglycemia protocol     #Crohn's disease      Mai Florentino,     Supplementary Documentation:     Quality:  DVT Mechanical Prophylaxis:   SCDs, Early ambuation  DVT Pharmacologic Prophylaxis   Medication    heparin (Porcine) 5000 UNIT/ML injection 5,000 Units                Code Status: Full Code  Sargent: No urinary catheter in place  Sargent Duration (in days): 2  Central line:    BRANDY:     The 21st Century Cures Act makes medical notes like these available to patients in the interest of transparency. Please be advised this is a medical document. Medical documents are intended to carry relevant information, facts as evident, and the clinical opinion of the practitioner. The medical note is intended as peer to peer communication and may appear blunt or direct. It is written in medical language and may contain abbreviations or verbiage that are unfamiliar.

## 2024-06-30 NOTE — PROGRESS NOTES
[facility]  Progress Note    Trino Reddy Patient Status:  Inpatient    1985 MRN [id number]   Location [facility] 3NW-A Attending Jaylan Hernandez MD   Hosp Day # 4 PCP Johnnie Mancilla MD     Subjective:  He is seen and examined resting in bed. He reports feeling okay postoperatively. He reports mild abdominal pain. He reports passing flatus but denies a bowel movement. He reports tolerating full liquid diet without nausea or vomiting. He denies fever or chills. MELIA drain with 105 mL of output yesterday.     Objective/Physical Exam:  /76 (BP Location: Left arm)   Pulse 57   Temp 98 °F (36.7 °C) (Oral)   Resp 18   Ht 74\"   Wt 227 lb   SpO2 97%   BMI 29.15 kg/m²     Intake/Output Summary (Last 24 hours) at 2024 1125  Last data filed at 2024 0811  Gross per 24 hour   Intake 5254 ml   Output 515 ml   Net 4739 ml         General: Awake, Alert,  Cooperative.  No apparent distress.  Pulm: no respiratory distress, no increased work of breathing  Abdomen:  Soft, non-distended,appropriate incisional tenderness, with no rebound or guarding.  No peritoneal signs. Wound vac over previous ostomy site.   Incision:  Clean, dry, intact without erythema.    Drains: MELIA drain in place with serosanguinous output.     Labs:      Lab Results   Component Value Date    INR 1.04 2024    INR 1.19 2024    INR 1.34 (H) 2024               Assessment:  Patient Active Problem List   Diagnosis    IBD (inflammatory bowel disease)    Depression    Anxiety    Class 2 obesity    Type 2 diabetes mellitus without complication, without long-term current use of insulin (HCC)    Tobacco dependence    History of tobacco use disorder    Hypokalemia    Leukocytosis    Hyperglycemia    Diverticulitis of colon with perforation    Acute left flank pain    Bowel perforation (HCC)    Acute hypoxic respiratory failure (HCC)    Retroperitoneal abscess (HCC)    Diverticulitis    History of colostomy  reversal    Type 2 diabetes mellitus without complication, with long-term current use of insulin (HCC)       Postop day 4 robotic colostomy reversal, extensive lysis of adhesions     Plan:  I was informed after rounding by the patients nurse that the patient had a bowel movement. Okay to advance to soft diet.   Analgesics and antiemetics as needed  Encourage ambulation and up to chair as able  Encourage incentive spirometry  Continue drain care  DVT prophylaxis with heparin  GI prophylaxis with pepcid  Can consider discharge home today vs tomorrow pending clinical course.     Estephania Turner PA-C  6/30/2024  11:25 AM    Addendum:    I have seen and examined the patient; I obtained and performed the above history, physical examination, assessment and plan.  I have I have edited the above to reflect my evaluation, opinion, and physical exam findings.    Exam and plan as above.    Care plan discussed.  All questions answered.  Discharge planning    I, Dr. Americo Dickson, personally performed the services described in this documentation  by Ms Johnny PA-C, and they are both accurate and complete.    Americo Dickson MD [facility] General Surgery

## 2024-06-30 NOTE — PROGRESS NOTES
Patient alert and oriented x4. On room air. Vital signs stable. HOB @ 30. Lung sounds clear bilaterally. Abdomen soft, non-distended. Lap sites x4 with skin glue clean, dry, and intact. MELIA x1 on the right dressing clean, dry, and intact- output serosanguinous. Wound vac dry and intact. Passing gas. Voids freely. Up ad sixto. Tolerating full liquid diet. Pain rated 5/10, declining pain medication at this time. Iv fluids infusing per order. All appropriate safety measures in place.

## 2024-06-30 NOTE — DISCHARGE INSTRUCTIONS
Post-Surgical Discharge Instructions    Jaylan Hernandez MD      Diet:  Continue on a soft diet until 6 weeks after the date of your surgery.  Soft diet means you can eat whatever you want, with moderation, except for the following items:    Celery                        Coconut Corn                           Dried Fruit  Green peppers            Lettuce  Mushrooms                Nuts  Olives                         Peas  Pickles                        Pineapple  Popcorn                      Spinach Raw vegetables  Seeds                          Skins of fruits and vegetables    Activity:  No heavy lifting greater than 10 lbs and no exercising for a total of 6 weeks from the date of surgery.  You may walk and climb stairs with moderation. If your abdomen is more uncomfortable than the day before, you need to be less active as you may be pulling on your stitches.    Bathing:  You may shower as often as you would like, but no submerging the incisions under water for a total of 2 weeks from the date of surgery.  This includes no swimming, no baths, and no hot-tubs.      Wound:  Keep the wound dry.  No dressing is necessary unless there is drainage coming from the wound.  If the drainage is excessive or looks like pus, please call our office.    Driving: You may drive provided that you are no longer taking your narcotic pain medication.      Bowel Function:  After surgery, your bowel movements will be irregular.  It is normal to have up to 3 bowel movements a day or as low as 1 bowel movement every 3 days.  Occasionally, your movements may be even more irregular than this.  As long as you are not vomiting or have a fever over 100.3, you don’t need to be overly concerned.      Return to Work:  Most patients return to work after 1-2 weeks from the date of their surgery.  You will still have a lifting restriction of no greater than 10 lbs from the date of your surgery until 6 weeks.  If your work requires heavy lifting,  you will need to stay off work for 6 weeks.  When you are ready to return to work, please call the office and we will send a work release to your employer.      Appointment: Please call our office for an appointment in 10-14 days after surgery, unless otherwise instructed.  This will allow ample time for the swelling and soreness to resolve before your wound is examined. If you have fevers, chills, or if you are concerned about your wound, please call us immediately at [phone].      Thank you for entrusting us with your care.

## 2024-06-30 NOTE — PLAN OF CARE
NURSING DISCHARGE NOTE    Discharged Home via Ambulatory.  Accompanied by Family member  Belongings Taken by patient/family.    VSS, tolerating diet, voiding adequately, pain controlled, tolerating ambulation. Discharge education provided. Reviewed medications and follow up appts. MELIA drain and portable woundvac intact. All questions answered and concerns addressed, pt verbalized understanding. IV x2 removed. Pt dc in stable condition. Patient declined wheelchair and left unit with parents at [phone]

## 2024-07-01 NOTE — TELEPHONE ENCOUNTER
Forms revised and faxed to the [facility] confirmation

## 2024-07-01 NOTE — TELEPHONE ENCOUNTER
Called patient, patient stated he needs long term disability, been off work since February 2024. After reviewing, discovered this is a revision on the original disability, provided info to processing agent.

## 2024-07-02 ENCOUNTER — PATIENT OUTREACH (OUTPATIENT)
Dept: CASE MANAGEMENT | Age: 39
End: 2024-07-02

## 2024-07-02 NOTE — PROGRESS NOTES
LM for pt to call [facility] for TCM since discharge. [facility] phone number was provided for pt to call back.

## 2024-07-02 NOTE — PROGRESS NOTES
Attempted to contact pt for TCM however call rang once and then disconnected. Unable to leave a VM at this time. NCM to try again at a later time.

## 2024-07-03 ENCOUNTER — OFFICE VISIT (OUTPATIENT)
Facility: LOCATION | Age: 39
End: 2024-07-03
Payer: COMMERCIAL

## 2024-07-03 VITALS — TEMPERATURE: 98 F | HEART RATE: 100 BPM | OXYGEN SATURATION: 96 %

## 2024-07-03 DIAGNOSIS — Z98.890 POST-OPERATIVE STATE: Primary | ICD-10-CM

## 2024-07-03 NOTE — TELEPHONE ENCOUNTER
Revised forms completed and faxed to the [facility]- confirmation received and MyChart sent to patient .. Scanned into chart for documentation.
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
## 2024-07-08 NOTE — TELEPHONE ENCOUNTER
Please review; protocol failed/No Protocol    No Active/Future labs pended     Requested Prescriptions   Pending Prescriptions Disp Refills    metFORMIN HCl 1000 MG Oral Tab 90 tablet 0     Sig: Take 1 tablet (1,000 mg total) by mouth daily with breakfast.       There is no refill protocol information for this order      Refused Prescriptions Disp Refills    METFORMIN HCL 1000 MG Oral Tab [Pharmacy Med Name: METFORMIN 1000MG TABLETS] 90 tablet 0     Sig: TAKE 1 TABLET(1000 MG) BY MOUTH DAILY WITH BREAKFAST       Diabetes Medication Protocol Failed - 7/2/2024  1:18 PM        Failed - Microalbumin procedure in past 12 months or taking ACE/ARB        Passed - Last A1C < 7.5 and within past 6 months     Lab Results   Component Value Date    A1C 5.9 (H) 06/26/2024             Passed - In person appointment or virtual visit in the past 6 mos or appointment in next 3 mos     Recent Outpatient Visits              5 days ago Post-operative state    [facility], Lillian Spence Nikki, PA    Office Visit    2 months ago Encounter for ostomy care education    [facility]    Office Visit    2 months ago Perforated diverticulum    [facility], Lillian Spence Jeremy, MD    Office Visit    3 months ago Status post Branden procedure (HCC)    [facility]    Office Visit    4 months ago Encounter for ostomy care education    [facility]    Office Visit          Future Appointments         Provider Department Appt Notes    In 2 days EM[facility] GEN SURG PA [facility], [place] PO- 06/26 ROBOTIC COLOSTOMY REVERSAL POSSIBLE OPEN, LYSIS OF ADHESIONS (staple removal)    In 1 month Jaylan Hernandez MD [facility], [place] - 06/26 ROBOTIC COLOSTOMY REVERSAL POSSIBLE OPEN, LYSIS OF ADHESIONS                     Passed - EGFRCR or GFRNAA > 50     GFR Evaluation  EGFRCR: 119 , resulted on 6/28/2024          Passed - GFR in the past 12 months           Future Appointments         Provider Department Appt Notes    In 2 days EMG GEN SURG PA [facility], [place] PO- 06/26 ROBOTIC COLOSTOMY REVERSAL POSSIBLE OPEN, LYSIS OF ADHESIONS (staple removal)    In 1 month Jaylan Hernandez MD [facility], [place] - 06/26 ROBOTIC COLOSTOMY REVERSAL POSSIBLE OPEN, LYSIS OF ADHESIONS          Recent Outpatient Visits              5 days ago Post-operative state    [facility], Lillian Spence Nikki, PA    Office Visit    2 months ago Encounter for ostomy care education    [facility]    Office Visit    2 months ago Perforated diverticulum    [facility]    Office Visit    3 months ago Status post Branden procedure (HCC)    [facility]    Office Visit    4 months ago Encounter for ostomy care education    [facility]    Office Visit

## 2024-07-10 ENCOUNTER — OFFICE VISIT (OUTPATIENT)
Facility: LOCATION | Age: 39
End: 2024-07-10
Payer: COMMERCIAL

## 2024-07-10 VITALS — HEART RATE: 97 BPM | TEMPERATURE: 98 F | OXYGEN SATURATION: 99 %

## 2024-07-10 DIAGNOSIS — Z98.890 HISTORY OF COLOSTOMY REVERSAL: ICD-10-CM

## 2024-07-10 DIAGNOSIS — Z93.3 STATUS POST HARTMANN PROCEDURE (HCC): ICD-10-CM

## 2024-07-10 DIAGNOSIS — Z98.890 POSTOPERATIVE STATE: Primary | ICD-10-CM

## 2024-07-10 PROCEDURE — 99024 POSTOP FOLLOW-UP VISIT: CPT

## 2024-07-10 RX ORDER — DOCUSATE SODIUM 100 MG/1
100 CAPSULE, LIQUID FILLED ORAL DAILY
Qty: 30 CAPSULE | Refills: 0 | Status: SHIPPED | OUTPATIENT
Start: 2024-07-10
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
## 2024-07-12 ENCOUNTER — PATIENT MESSAGE (OUTPATIENT)
Facility: LOCATION | Age: 39
End: 2024-07-12

## 2024-07-12 NOTE — TELEPHONE ENCOUNTER
From: Trino Reddy  To: Jaylan Hernandez  Sent: 7/12/2024 10:31 AM CDT  Subject: Return to work    Hi Dr Hernandez,    I got a phone call from the [employer] about my disability. They are still thinking my return to work date is August 6th which is what we put on the last update that was sent in. We had to put a date because they wouldn’t accept a 4-6 week timeline without an actual date. My follow up appointment with you is scheduled for August 12th am I supposed to go back to work before having a follow up?

## 2024-07-15 ENCOUNTER — TELEPHONE (OUTPATIENT)
Facility: LOCATION | Age: 39
End: 2024-07-15

## 2024-07-23 NOTE — TELEPHONE ENCOUNTER
Disability revised. Faxed to [employer] [phone]. Confirmation received, [employer] message sent.

## 2024-07-23 NOTE — TELEPHONE ENCOUNTER
Patient called. Patient states that the [employer] has not received updated forms yet, that are supposed to reflect the new return to work date as 8/19/24. Unable to locate form that had already been revised - last form was Return to work on 8/8. Patient needs another revision for 8/19 Return to work date sent to the [employer].

## 2024-08-06 ENCOUNTER — TELEPHONE (OUTPATIENT)
Dept: INTERNAL MEDICINE CLINIC | Facility: CLINIC | Age: 39
End: 2024-08-06

## 2024-08-06 NOTE — TELEPHONE ENCOUNTER
Paperwork was received on 4/30/24-discharge summary. It was reviewed by Dr Mancilla and sent to scan.

## 2024-08-08 ENCOUNTER — NURSE TRIAGE (OUTPATIENT)
Dept: INTERNAL MEDICINE CLINIC | Facility: CLINIC | Age: 39
End: 2024-08-08

## 2024-08-08 NOTE — TELEPHONE ENCOUNTER
Action Requested: Summary for Provider     []  Critical Lab, Recommendations Needed  [] Need Additional Advice  []   FYI    []   Need Orders  [] Need Medications Sent to Pharmacy  []  Other     SUMMARY: received call from pt. Per pt, he has been experiencing intermittent tinnitus in L ear over past few weeks. Patient stated it feels like his ear fills with water, similar to how it feels after swimming, but pt stated he has not been swimming in years. Denies pain. Offered appointment with Cecilia Doyle tomorrow for edilia, pt agreeable.       Reason for call: Acute  Onset: Data Unavailable      Reason for Disposition   Symptoms only or mainly in 1 ear (unilateral tinnitus)   Patient wants to be seen    Protocols used: Tinnitus-A-[place]

## 2024-08-09 ENCOUNTER — TELEPHONE (OUTPATIENT)
Facility: LOCATION | Age: 39
End: 2024-08-09

## 2024-08-12 ENCOUNTER — OFFICE VISIT (OUTPATIENT)
Facility: LOCATION | Age: 39
End: 2024-08-12
Payer: COMMERCIAL

## 2024-08-12 VITALS — HEART RATE: 85 BPM | TEMPERATURE: 97 F

## 2024-08-12 DIAGNOSIS — Z98.890 HISTORY OF COLOSTOMY REVERSAL: Primary | ICD-10-CM

## 2024-08-12 RX ORDER — DOCUSATE SODIUM 100 MG/1
100 CAPSULE, LIQUID FILLED ORAL DAILY
Qty: 30 CAPSULE | Refills: 0 | Status: SHIPPED | OUTPATIENT
Start: 2024-08-12
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
## 2024-08-13 ENCOUNTER — OFFICE VISIT (OUTPATIENT)
Dept: INTERNAL MEDICINE CLINIC | Facility: CLINIC | Age: 39
End: 2024-08-13
Payer: COMMERCIAL

## 2024-08-13 VITALS
OXYGEN SATURATION: 98 % | SYSTOLIC BLOOD PRESSURE: 124 MMHG | HEART RATE: 83 BPM | BODY MASS INDEX: 29 KG/M2 | TEMPERATURE: 97 F | DIASTOLIC BLOOD PRESSURE: 66 MMHG | WEIGHT: 228.19 LBS | RESPIRATION RATE: 18 BRPM

## 2024-08-13 DIAGNOSIS — H93.12 TINNITUS OF LEFT EAR: Primary | ICD-10-CM

## 2024-08-13 PROCEDURE — 99212 OFFICE O/P EST SF 10 MIN: CPT
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
## 2024-08-16 NOTE — TELEPHONE ENCOUNTER
Americans with Disabilities Act forms received in forms dept. Possible revision. Informed rep completing revisions and placed in her box. Valid authorization on file exp 02/25/25.

## 2024-08-16 NOTE — TELEPHONE ENCOUNTER
Americans with Disabilities Act/Protected health information faxed to [employer] @ [phone] as requested, also sending via [employer], archiving forms.

## 2024-08-16 NOTE — TELEPHONE ENCOUNTER
Cld patient to get info on Revision, patient still plans to Return to work on 08/19/2024, patient states that what [employer] [facility] really needs is the most recent Office Visit notes, processing this is as a Protected health information and not a Revision, also sending copy of recent Revision w/ the Return to work date of 08/19/2024 on it...    Faxing to [employer] [facility], pending confirmation

## 2024-08-29 DIAGNOSIS — F41.9 ANXIETY: ICD-10-CM

## 2024-08-30 RX ORDER — TRAZODONE HYDROCHLORIDE 50 MG/1
50 TABLET, FILM COATED ORAL NIGHTLY
Qty: 90 TABLET | Refills: 0 | Status: SHIPPED | OUTPATIENT
Start: 2024-08-30

## 2024-08-30 NOTE — TELEPHONE ENCOUNTER
Please review. Protocol Failed; No Protocol    Requested Prescriptions   Pending Prescriptions Disp Refills    TRAZODONE 50 MG Oral Tab [Pharmacy Med Name: TRAZODONE 50MG TABLETS] 90 tablet 0     Sig: TAKE 1 TABLET(50 MG) BY MOUTH EVERY NIGHT       There is no refill protocol information for this order            Future Appointments         Provider Department Appt Notes    In 2 months Jyalan Hernandez MD [facility], [place] 3month f/u- PO- 06/26 ROBOTIC COLOSTOMY REVERSAL POSSIBLE OPEN, LYSIS OF ADHESIONS  enrolled patient in payment plan for current balance- no action need at this time.          Recent Outpatient Visits              2 weeks ago Tinnitus of left ear    [facility], [address] [place] [address], Cecilia Nicolas APRN    Office Visit    2 weeks ago History of colostomy reversal    [facility], Lillian Spence Jeremy, MD    Office Visit    1 month ago Postoperative [facility], Lillian Spence Jessica, PA-C    Office Visit    1 month ago Post-operative [facility], Lillian Spence Nikki, PA    Office Visit    3 months ago Encounter for ostomy care education    [facility]    Office Visit

## 2024-10-03 ENCOUNTER — PATIENT MESSAGE (OUTPATIENT)
Dept: INTERNAL MEDICINE CLINIC | Facility: CLINIC | Age: 39
End: 2024-10-03

## 2024-10-03 RX ORDER — NICOTINE POLACRILEX 4 MG/1
1 GUM, CHEWING ORAL DAILY
Qty: 90 TABLET | Refills: 0 | Status: SHIPPED | OUTPATIENT
Start: 2024-10-03 | End: 2024-11-02

## 2024-10-14 NOTE — TELEPHONE ENCOUNTER
Due for annual physical examination; please schedule as soon as possible.  Omeprazole 20 mg daily is available over-the-counter.  I have sent a prescription.
From: Trino Reddy  To: Johnnie Mancilla  Sent: 10/3/2024 10:28 AM CDT  Subject: Prescription refill    Breanna Mancilla. Can I get a prescription refilled for omniperal (not sure on the spelling) for heart burn. I just realized the prescribing doctor listed on it is from when I lived in [place] and I ran out this morning.   
Future Appointments   Date Time Provider Department Center   12/9/2024  5:00 PM Johnnie Mancilla MD [facility]        
LOV 8/13/24 with keith sevilla for tinnitus  Last physical 6/1/23    Future Appointments   Date Time Provider Department Center   11/5/2024  9:45 AM Jaylan Hernandez MD [id number]        Patient is requesting a refill on omeprazole 40mg, states this was originally prescribed by his doctor in [place]    Ok for refill?  
Offered patient opening today with Dr. Johnnie Mancilla, patient on his way to work.  Patient will call back to schedule a cpe  
Normal for race

## 2024-11-05 ENCOUNTER — OFFICE VISIT (OUTPATIENT)
Facility: LOCATION | Age: 39
End: 2024-11-05
Payer: COMMERCIAL

## 2024-11-05 VITALS
SYSTOLIC BLOOD PRESSURE: 116 MMHG | HEART RATE: 88 BPM | TEMPERATURE: 97 F | OXYGEN SATURATION: 97 % | DIASTOLIC BLOOD PRESSURE: 70 MMHG

## 2024-11-05 DIAGNOSIS — Z98.890 HISTORY OF COLOSTOMY REVERSAL: Primary | ICD-10-CM

## 2024-11-05 PROCEDURE — 99213 OFFICE O/P EST LOW 20 MIN: CPT | Performed by: STUDENT IN AN ORGANIZED HEALTH CARE EDUCATION/TRAINING PROGRAM

## 2024-11-13 DIAGNOSIS — E87.6 HYPOKALEMIA: ICD-10-CM

## 2024-11-13 DIAGNOSIS — Z00.00 ROUTINE GENERAL MEDICAL EXAMINATION AT A HEALTH CARE FACILITY: ICD-10-CM

## 2024-11-13 DIAGNOSIS — D69.59 THROMBOCYTOPENIA DUE TO BLOOD LOSS: ICD-10-CM

## 2024-11-13 DIAGNOSIS — E11.9 TYPE 2 DIABETES MELLITUS WITHOUT COMPLICATION, WITHOUT LONG-TERM CURRENT USE OF INSULIN (HCC): Primary | ICD-10-CM
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
## 2024-12-01 LAB
ABSOLUTE BASOPHILS: 40 CELLS/UL (ref 0–200)
ABSOLUTE EOSINOPHILS: 253 CELLS/UL (ref 15–500)
ABSOLUTE LYMPHOCYTES: 2062 CELLS/UL (ref 850–3900)
ABSOLUTE MONOCYTES: 561 CELLS/UL (ref 200–950)
ABSOLUTE NEUTROPHILS: 4985 CELLS/UL (ref 1500–7800)
ALBUMIN/GLOBULIN RATIO: 1.5 (CALC) (ref 1–2.5)
ALBUMIN: 4.8 G/DL (ref 3.6–5.1)
ALKALINE PHOSPHATASE: 75 U/L (ref 36–130)
ALT: 30 U/L (ref 9–46)
AST: 15 U/L (ref 10–40)
BASOPHILS: 0.5 %
BILIRUBIN, TOTAL: 1.1 MG/DL (ref 0.2–1.2)
BUN: 16 MG/DL (ref 7–25)
CALCIUM: 9.8 MG/DL (ref 8.6–10.3)
CARBON DIOXIDE: 28 MMOL/L (ref 20–32)
CHLORIDE: 104 MMOL/L (ref 98–110)
CHOL/HDLC RATIO: 4.3 (CALC)
CHOLESTEROL, TOTAL: 187 MG/DL
CREATININE: 0.73 MG/DL (ref 0.6–1.26)
EGFR: 119 ML/MIN/1.73M2
EOSINOPHILS: 3.2 %
GLOBULIN: 3.2 G/DL (CALC) (ref 1.9–3.7)
GLUCOSE: 121 MG/DL (ref 65–99)
HDL CHOLESTEROL: 43 MG/DL
HEMATOCRIT: 49 % (ref 38.5–50)
HEMOGLOBIN: 16 G/DL (ref 13.2–17.1)
LDL-CHOLESTEROL: 117 MG/DL (CALC)
LYMPHOCYTES: 26.1 %
MCH: 29.4 PG (ref 27–33)
MCHC: 32.7 G/DL (ref 32–36)
MCV: 90.1 FL (ref 80–100)
MONOCYTES: 7.1 %
MPV: 10.3 FL (ref 7.5–12.5)
NEUTROPHILS: 63.1 %
NON-HDL CHOLESTEROL: 144 MG/DL (CALC)
PLATELET COUNT: 298 THOUSAND/UL (ref 140–400)
POTASSIUM: 4.8 MMOL/L (ref 3.5–5.3)
PROTEIN, TOTAL: 8 G/DL (ref 6.1–8.1)
RDW: 12.6 % (ref 11–15)
RED BLOOD CELL COUNT: 5.44 MILLION/UL (ref 4.2–5.8)
SODIUM: 139 MMOL/L (ref 135–146)
TRIGLYCERIDES: 153 MG/DL
WHITE BLOOD CELL COUNT: 7.9 THOUSAND/UL (ref 3.8–10.8)

## 2024-12-03 ENCOUNTER — PATIENT MESSAGE (OUTPATIENT)
Dept: INTERNAL MEDICINE CLINIC | Facility: CLINIC | Age: 39
End: 2024-12-03

## 2024-12-04 NOTE — TELEPHONE ENCOUNTER
Patient had MRI on 12/2 - report available in care everywhere.     Concern for MS in left eye, has a neurology referral but unable to get in until May.    Requesting referral for neurologist.     Please advise  RN sent message  for patient to schedule an appointment with you as well

## 2024-12-04 NOTE — TELEPHONE ENCOUNTER
Agree with more urgent concern. Please see if we can facilitate an expedited evaluation by our neurology service. And yes, will discuss all of the above during upcoming office visit. Thank you

## 2024-12-05 NOTE — TELEPHONE ENCOUNTER
Spoke to Jose Armando at [facility] to secure the earliest appointment available related to patients new diagnosis, the soonest 2/5/205 at 140p and placed on waitlist.  I called patient to advise and he stated he was able to get in with [facility] on Monday 12/8/24.  I called back [facility] and cancelled appointment.    Dr. Charo PA patient able to secure neuro appointment with [facility] for Monday 12/9/24

## 2024-12-09 ENCOUNTER — OFFICE VISIT (OUTPATIENT)
Dept: INTERNAL MEDICINE CLINIC | Facility: CLINIC | Age: 39
End: 2024-12-09
Payer: COMMERCIAL

## 2024-12-09 ENCOUNTER — NURSE ONLY (OUTPATIENT)
Dept: INTERNAL MEDICINE CLINIC | Facility: CLINIC | Age: 39
End: 2024-12-09
Payer: COMMERCIAL

## 2024-12-09 VITALS
RESPIRATION RATE: 16 BRPM | SYSTOLIC BLOOD PRESSURE: 110 MMHG | HEART RATE: 82 BPM | HEIGHT: 74 IN | BODY MASS INDEX: 30.42 KG/M2 | DIASTOLIC BLOOD PRESSURE: 70 MMHG | WEIGHT: 237 LBS | TEMPERATURE: 97 F | OXYGEN SATURATION: 98 %

## 2024-12-09 DIAGNOSIS — G35 MULTIPLE SCLEROSIS (HCC): ICD-10-CM

## 2024-12-09 DIAGNOSIS — R80.9 TYPE 2 DIABETES MELLITUS WITH MICROALBUMINURIA, WITHOUT LONG-TERM CURRENT USE OF INSULIN (HCC): ICD-10-CM

## 2024-12-09 DIAGNOSIS — Z93.3 STATUS POST HARTMANN PROCEDURE (HCC): ICD-10-CM

## 2024-12-09 DIAGNOSIS — H50.30 INTERMITTENT ESOTROPIA: ICD-10-CM

## 2024-12-09 DIAGNOSIS — E11.29 TYPE 2 DIABETES MELLITUS WITH MICROALBUMINURIA, WITHOUT LONG-TERM CURRENT USE OF INSULIN (HCC): ICD-10-CM

## 2024-12-09 DIAGNOSIS — Z00.00 ROUTINE GENERAL MEDICAL EXAMINATION AT A HEALTH CARE FACILITY: Primary | ICD-10-CM

## 2024-12-09 DIAGNOSIS — F32.A DEPRESSION, UNSPECIFIED DEPRESSION TYPE: ICD-10-CM

## 2024-12-09 DIAGNOSIS — H46.9 OPTIC NEUROPATHY: ICD-10-CM

## 2024-12-09 DIAGNOSIS — Z98.890 HISTORY OF COLOSTOMY REVERSAL: ICD-10-CM

## 2024-12-09 DIAGNOSIS — H49.22 6TH NERVE PALSY, LEFT: ICD-10-CM

## 2024-12-09 DIAGNOSIS — F41.9 ANXIETY: ICD-10-CM

## 2024-12-09 DIAGNOSIS — Z87.891 HISTORY OF TOBACCO USE DISORDER: ICD-10-CM

## 2024-12-09 PROBLEM — K57.20 DIVERTICULITIS OF COLON WITH PERFORATION: Status: RESOLVED | Noted: 2024-02-17 | Resolved: 2024-12-09

## 2024-12-09 PROBLEM — D72.829 LEUKOCYTOSIS: Status: RESOLVED | Noted: 2024-02-17 | Resolved: 2024-12-09

## 2024-12-09 PROBLEM — K63.1 BOWEL PERFORATION (HCC): Status: RESOLVED | Noted: 2024-02-17 | Resolved: 2024-12-09

## 2024-12-09 PROBLEM — E66.812 CLASS 2 OBESITY: Status: RESOLVED | Noted: 2022-03-29 | Resolved: 2024-12-09

## 2024-12-09 PROBLEM — E11.9 TYPE 2 DIABETES MELLITUS WITHOUT COMPLICATION, WITHOUT LONG-TERM CURRENT USE OF INSULIN (HCC): Status: RESOLVED | Noted: 2022-03-29 | Resolved: 2024-12-09

## 2024-12-09 PROBLEM — K52.9 IBD (INFLAMMATORY BOWEL DISEASE): Status: RESOLVED | Noted: 2018-12-27 | Resolved: 2024-12-09

## 2024-12-09 PROBLEM — R73.9 HYPERGLYCEMIA: Status: RESOLVED | Noted: 2024-02-17 | Resolved: 2024-12-09

## 2024-12-09 PROBLEM — K68.19 RETROPERITONEAL ABSCESS (HCC): Status: RESOLVED | Noted: 2024-03-18 | Resolved: 2024-12-09

## 2024-12-09 PROBLEM — E87.6 HYPOKALEMIA: Status: RESOLVED | Noted: 2024-02-17 | Resolved: 2024-12-09

## 2024-12-09 PROBLEM — K57.92 DIVERTICULITIS: Status: RESOLVED | Noted: 2024-06-26 | Resolved: 2024-12-09

## 2024-12-09 PROBLEM — F17.200 TOBACCO DEPENDENCE: Status: RESOLVED | Noted: 2022-03-29 | Resolved: 2024-12-09

## 2024-12-09 PROBLEM — J96.01 ACUTE HYPOXIC RESPIRATORY FAILURE (HCC): Status: RESOLVED | Noted: 2024-02-18 | Resolved: 2024-12-09

## 2024-12-09 PROBLEM — R10.9 ACUTE LEFT FLANK PAIN: Status: RESOLVED | Noted: 2024-02-17 | Resolved: 2024-12-09

## 2024-12-09 LAB — HEMOGLOBIN A1C: 6.2 % (ref 4.3–5.6)

## 2024-12-09 RX ORDER — TRAZODONE HYDROCHLORIDE 100 MG/1
100 TABLET ORAL NIGHTLY
Qty: 90 TABLET | Refills: 3 | Status: SHIPPED | OUTPATIENT
Start: 2024-12-09 | End: 2025-12-04

## 2024-12-09 RX ORDER — OMEPRAZOLE 40 MG/1
40 CAPSULE, DELAYED RELEASE ORAL DAILY
Qty: 90 CAPSULE | Refills: 3 | Status: SHIPPED | OUTPATIENT
Start: 2024-12-09 | End: 2025-12-04
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
## 2024-12-10 LAB
CREATININE, RANDOM URINE: 82 MG/DL (ref 20–320)
MICROALBUMIN/CREATININE RATIO, RANDOM URINE: 9 MG/G CREAT
MICROALBUMIN: 0.7 MG/DL

## 2025-02-05 ENCOUNTER — OFFICE VISIT (OUTPATIENT)
Dept: NEUROLOGY | Facility: CLINIC | Age: 40
End: 2025-02-05
Payer: COMMERCIAL

## 2025-02-05 VITALS
DIASTOLIC BLOOD PRESSURE: 68 MMHG | BODY MASS INDEX: 31 KG/M2 | SYSTOLIC BLOOD PRESSURE: 118 MMHG | HEART RATE: 88 BPM | WEIGHT: 243.38 LBS | RESPIRATION RATE: 16 BRPM

## 2025-02-05 DIAGNOSIS — G35 MS (MULTIPLE SCLEROSIS) (HCC): Primary | ICD-10-CM

## 2025-02-05 PROCEDURE — 99204 OFFICE O/P NEW MOD 45 MIN: CPT | Performed by: OTHER

## 2025-02-05 NOTE — PATIENT INSTRUCTIONS
Refill policies:    Allow 2-3 business days for refills; controlled substances may take longer.  Contact your pharmacy at least 5 days prior to running out of medication and have them send an electronic request or submit request through the “request refill” option in your [facility] account.  Refills are not addressed on weekends; covering physicians do not authorize routine medications on weekends.  No narcotics or controlled substances are refilled after noon on Fridays or by on call physicians.  By law, narcotics must be electronically prescribed.  A 30 day supply with no refills is the maximum allowed.  If your prescription is due for a refill, you may be due for a follow up appointment.  To best provide you care, patients receiving routine medications need to be seen at least once a year.  Patients receiving narcotic/controlled substance medications need to be seen at least once every 3 months.  In the event that your preferred pharmacy does not have the requested medication in stock (e.g. Backordered), it is your responsibility to find another pharmacy that has the requested medication available.  We will gladly send a new prescription to that pharmacy at your request.    Scheduling Tests:    If your physician has ordered radiology tests such as MRI or CT scans, please contact Central Scheduling at [phone] right away to schedule the test.  Once scheduled, the [facility] Centralized Referral Team will work with your insurance carrier to obtain pre-certification or prior authorization.  Depending on your insurance carrier, approval may take 3-10 days.  It is highly recommended patients assure they have received an authorization before having a test performed.  If test is done without insurance authorization, patient may be responsible for the entire amount billed.      Precertification and Prior Authorizations:  If your physician has recommended that you have a procedure or additional testing performed the [facility]  Centralized Referral Team will contact your insurance carrier to obtain pre-certification or prior authorization.    You are strongly encouraged to contact your insurance carrier to verify that your procedure/test has been approved and is a COVERED benefit.  Although the [facility] Centralized Referral Team does its due diligence, the insurance carrier gives the disclaimer that \"Although the procedure is authorized, this does not guarantee payment.\"    Ultimately the patient is responsible for payment.   Thank you for your understanding in this matter.  Paperwork Completion:  If you require FMLA or disability paperwork for your recovery, please make sure to either drop it off or have it faxed to our office at [phone]. Be sure the form has your name and date of birth on it.  The form will be faxed to our Forms Department and they will complete it for you.  There is a 25$ fee for all forms that need to be filled out.  Please be aware there is a 10-14 day turnaround time.  You will need to sign a release of information (SANG) form if your paperwork does not come with one.  You may call the Forms Department with any questions at [phone].  Their fax number is [phone].
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
## 2025-02-18 ENCOUNTER — PATIENT MESSAGE (OUTPATIENT)
Dept: INTERNAL MEDICINE CLINIC | Facility: CLINIC | Age: 40
End: 2025-02-18

## 2025-02-19 NOTE — TELEPHONE ENCOUNTER
I do not have any injections in relation to his surgical history, however I would have to defer to his neurologist for recommendations for treatment.

## 2025-02-19 NOTE — TELEPHONE ENCOUNTER
LOV 12/9/24     AD- Pt asking for your thoughts about Ocrevous treatments given his health, surgery and diverticulitis last year. Please advise

## 2025-03-21 NOTE — TELEPHONE ENCOUNTER
Please Review. Protocol Failed; No Protocol   Please advise medication is patient external reported or historical.

## 2025-03-26 ENCOUNTER — PATIENT MESSAGE (OUTPATIENT)
Dept: INTERNAL MEDICINE CLINIC | Facility: CLINIC | Age: 40
End: 2025-03-26

## 2025-03-31 PROCEDURE — 82043 UR ALBUMIN QUANTITATIVE: CPT

## 2025-03-31 PROCEDURE — 82570 ASSAY OF URINE CREATININE: CPT

## 2025-04-15 DIAGNOSIS — R80.9 PROTEINURIA: ICD-10-CM

## 2025-04-15 DIAGNOSIS — E11.29 TYPE II DIABETES MELLITUS WITH RENAL MANIFESTATIONS (HCC): Primary | ICD-10-CM

## 2025-05-01 NOTE — TELEPHONE ENCOUNTER
Please review. Protocol Pass    Original rx written 30 with no refills.  Rx is pended, is refill appropriate?

## 2025-05-02 RX ORDER — DULOXETIN HYDROCHLORIDE 20 MG/1
20 CAPSULE, DELAYED RELEASE ORAL DAILY
Qty: 90 CAPSULE | Refills: 0 | Status: SHIPPED | OUTPATIENT
Start: 2025-05-02

## 2025-05-14 ENCOUNTER — TELEPHONE (OUTPATIENT)
Dept: INTERNAL MEDICINE CLINIC | Facility: CLINIC | Age: 40
End: 2025-05-14

## 2025-05-14 NOTE — TELEPHONE ENCOUNTER
Requesting duloxetine sent to mail order pharmacy     Patient did not have mail order pharmacy name, advised to reach back out to insurance to get the name and address of pharmacy and call back

## 2025-07-18 ENCOUNTER — OFFICE VISIT (OUTPATIENT)
Dept: NEUROLOGY | Facility: CLINIC | Age: 40
End: 2025-07-18
Payer: COMMERCIAL

## 2025-07-18 VITALS
WEIGHT: 238 LBS | RESPIRATION RATE: 16 BRPM | BODY MASS INDEX: 31 KG/M2 | DIASTOLIC BLOOD PRESSURE: 62 MMHG | HEART RATE: 78 BPM | SYSTOLIC BLOOD PRESSURE: 100 MMHG

## 2025-07-18 DIAGNOSIS — G35 MS (MULTIPLE SCLEROSIS) (HCC): Primary | ICD-10-CM

## 2025-07-18 PROCEDURE — 99214 OFFICE O/P EST MOD 30 MIN: CPT | Performed by: OTHER

## 2025-07-18 RX ORDER — TERIFLUNOMIDE 14 MG/1
TABLET, FILM COATED ORAL
COMMUNITY
Start: 2025-05-02 | End: 2025-07-18

## 2025-07-18 RX ORDER — TERIFLUNOMIDE 14 MG/1
14 TABLET, FILM COATED ORAL DAILY
Qty: 30 TABLET | Refills: 5 | Status: SHIPPED | OUTPATIENT
Start: 2025-07-18

## 2025-07-18 RX ORDER — ZAVEGEPANT 10 MG/.1ML
10 SPRAY NASAL DAILY
Qty: 2 EACH | Refills: 0 | COMMUNITY
Start: 2025-07-18 | End: 2025-07-18 | Stop reason: CLARIF

## 2025-07-18 NOTE — PATIENT INSTRUCTIONS
Refill policies:    Allow 2-3 business days for refills; controlled substances may take longer.  Contact your pharmacy at least 5 days prior to running out of medication and have them send an electronic request or submit request through the “request refill” option in your [facility] account.  Refills are not addressed on weekends; covering physicians do not authorize routine medications on weekends.  No narcotics or controlled substances are refilled after noon on Fridays or by on call physicians.  By law, narcotics must be electronically prescribed.  A 30 day supply with no refills is the maximum allowed.  If your prescription is due for a refill, you may be due for a follow up appointment.  To best provide you care, patients receiving routine medications need to be seen at least once a year.  Patients receiving narcotic/controlled substance medications need to be seen at least once every 3 months.  In the event that your preferred pharmacy does not have the requested medication in stock (e.g. Backordered), it is your responsibility to find another pharmacy that has the requested medication available.  We will gladly send a new prescription to that pharmacy at your request.    Scheduling Tests:    If your physician has ordered radiology tests such as MRI or CT scans, please contact Central Scheduling at [phone] right away to schedule the test.  Once scheduled, the [facility] Centralized Referral Team will work with your insurance carrier to obtain pre-certification or prior authorization.  Depending on your insurance carrier, approval may take 3-10 days.  It is highly recommended patients assure they have received an authorization before having a test performed.  If test is done without insurance authorization, patient may be responsible for the entire amount billed.      Precertification and Prior Authorizations:  If your physician has recommended that you have a procedure or additional testing performed the [facility]  Centralized Referral Team will contact your insurance carrier to obtain pre-certification or prior authorization.    You are strongly encouraged to contact your insurance carrier to verify that your procedure/test has been approved and is a COVERED benefit.  Although the [facility] Centralized Referral Team does its due diligence, the insurance carrier gives the disclaimer that \"Although the procedure is authorized, this does not guarantee payment.\"    Ultimately the patient is responsible for payment.   Thank you for your understanding in this matter.  Paperwork Completion:  If you require FMLA or disability paperwork for your recovery, please make sure to either drop it off or have it faxed to our office at [phone]. Be sure the form has your name and date of birth on it.  The form will be faxed to our Forms Department and they will complete it for you.  There is a 25$ fee for all forms that need to be filled out.  Please be aware there is a 10-14 day turnaround time.  You will need to sign a release of information (SANG) form if your paperwork does not come with one.  You may call the Forms Department with any questions at [phone].  Their fax number is [phone].

## 2025-07-18 NOTE — PROGRESS NOTES
The following individual(s) verbally consented to be recorded using ambient AI listening technology and understand that they can each withdraw their consent to this listening technology at any point by asking the clinician to turn off or pause the recording:    Patient name: Trino Reddy    Pt is here to go over imaging
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
## 2025-08-29 RX ORDER — DULOXETIN HYDROCHLORIDE 20 MG/1
20 CAPSULE, DELAYED RELEASE ORAL DAILY
Qty: 30 CAPSULE | Refills: 0 | Status: SHIPPED | OUTPATIENT
Start: 2025-08-29

## (undated) DEVICE — Device

## (undated) DEVICE — SUTURE COAT VCRL 3-0 27IN ABSRB UD 26MM SH

## (undated) DEVICE — SUT PDS II 0 36IN CT-1 ABSRB VLT L36MM 1/2

## (undated) DEVICE — KIT CUSTOM ENDOPROCEDURE STERIS

## (undated) DEVICE — ENDOCUT SCISSOR TIP, DISPOSABLE: Brand: RENEW

## (undated) DEVICE — SOLUTION IRRIG 3000ML 0.9% NACL FLX CONT

## (undated) DEVICE — BAG DRNGE 2000ML URIN INF CTRL ANTI REFLX

## (undated) DEVICE — FILTERLINE NASAL ADULT O2/CO2

## (undated) DEVICE — LAPCLINCH GRASPER TIP, DISPOSABLE: Brand: RENEW

## (undated) DEVICE — HUNTER GASPER TIP, DISPOSABLE: Brand: RENEW

## (undated) DEVICE — PAD SACRAL SPAN AID

## (undated) DEVICE — TIP GRSP DISP LAPCLINCH

## (undated) DEVICE — GLOVE SURGICAL 7.5 SENSICARE P

## (undated) DEVICE — WOUND RETRACTOR AND PROTECTOR: Brand: ALEXIS O WOUND PROTECTOR-RETRACTOR

## (undated) DEVICE — SOLUTION PREP 4OZ 10% POVIDONE IOD SCR TOP

## (undated) DEVICE — NEEDLE VERESS 120MM

## (undated) DEVICE — CIRCULAR MECH XL SEAL 29MM

## (undated) DEVICE — SPONGE STICK WITH PVP-I: Brand: KENDALL

## (undated) DEVICE — GAMMEX® NON-LATEX PI TEXTURED SIZE 7.5, STERILE POLYISOPRENE POWDER-FREE SURGICAL GLOVE: Brand: GAMMEX

## (undated) DEVICE — CLOSING BUNDLE: Brand: MEDLINE INDUSTRIES, INC.

## (undated) DEVICE — ENSEAL BIPOLAR SEALER L20CM LG

## (undated) DEVICE — SUTURE COAT VCRL 1 36IN ABSRB VLT 36MM CT-1

## (undated) DEVICE — GAUZE PACKING IODOFORM 1/4X5

## (undated) DEVICE — ARM DRAPE

## (undated) DEVICE — DRAPE,UNDRBUT,WHT GRAD PCH,CAPPORT,20/CS: Brand: MEDLINE

## (undated) DEVICE — VALVE AIR/H20 DEFENDO SCT BX

## (undated) DEVICE — TROCAR: Brand: KII FIOS FIRST ENTRY

## (undated) DEVICE — 3M™ TEGADERM™ TRANSPARENT FILM DRESSING FRAME STYLE, 1626W, 4 IN X 4-3/4 IN (10 CM X 12 CM), 50/CT 4CT/CASE: Brand: 3M™ TEGADERM™

## (undated) DEVICE — SYSTEM POS W20XH1XL29IN PT PIGAZZI

## (undated) DEVICE — PACK CDS LAP COLON

## (undated) DEVICE — ABSORBABLE WOUND CLOSURE DEVICE: Brand: V-LOC 90

## (undated) DEVICE — COVER,MAYO STAND,STERILE: Brand: MEDLINE

## (undated) DEVICE — SEAL

## (undated) DEVICE — PENCIL TELESCOPE MEGADYNE SE

## (undated) DEVICE — DRAIN SUR 19FR L0.25IN 3/4 FLUT 3/16IN TRCR

## (undated) DEVICE — SUTURE PDS II 0 L27IN ABSRB VLT L26MM CT-2

## (undated) DEVICE — #11 STERILE BLADE: Brand: POLYMER COATED BLADES

## (undated) DEVICE — VISUALIZATION SYSTEM: Brand: CLEARIFY

## (undated) DEVICE — CANNULA SEAL

## (undated) DEVICE — CURAD PAPER TAPE 2IN

## (undated) DEVICE — REDUCER: Brand: ENDOWRIST

## (undated) DEVICE — HEAD AND NECK CDS-LF: Brand: MEDLINE INDUSTRIES, INC.

## (undated) DEVICE — KENDALL SCD EXPRESS SLEEVES, KNEE LENGTH, MEDIUM: Brand: KENDALL SCD

## (undated) DEVICE — CATH FOLEY 30FR 30CC 2-W LTX HYDRPHLC

## (undated) DEVICE — VESSEL SEALER EXTEND: Brand: ENDOWRIST

## (undated) DEVICE — LAPAROVUE VISIBILITY SYSTEM LAPAROSCOPIC SOLUTIONS: Brand: LAPAROVUE

## (undated) DEVICE — BANDAGE ROLL,100% COTTON, 6 PLY, LARGE: Brand: KERLIX

## (undated) DEVICE — SYSTEM ACCS L100MM DIA12MM ABD SHLD BLADED Z

## (undated) DEVICE — SET CYSTO IRRIG L77IN DIA0.241IN BLDR NVENT

## (undated) DEVICE — GLOVE SUR 7 SENSICARE PI PIP CRM PWD F

## (undated) DEVICE — SUTURE MCRYL 4-0 18IN ABSRB UD 19MM PS-2 3/8

## (undated) DEVICE — ADHESIVE SKIN TOP FOR WND CLSR DERMBND ADV

## (undated) DEVICE — SLEEVE TRCR L100MM DIA5MM ABD Z THRD KII

## (undated) DEVICE — ENDOPATH ULTRA VERESS INSUFFLATION NEEDLES WITH LUER LOCK CONNECTORS: Brand: ENDOPATH

## (undated) DEVICE — YANKAUER,BULB TIP,W/O VENT,RIGID,STERILE: Brand: MEDLINE

## (undated) DEVICE — 1200CC GUARDIAN II: Brand: GUARDIAN

## (undated) DEVICE — CATHETER URETH 30FR BLLN 30CC LTX HYDRPHLC

## (undated) DEVICE — DRESSING ANTIMIC 3.5 X 12 IN AQCEL AG ADVNTG

## (undated) DEVICE — SLEEVE COMPR M KNEE LEN SGL USE KENDALL SCD

## (undated) DEVICE — EVACUATOR SUR 100CC SIL BLB WND

## (undated) DEVICE — GLOVE SUR 7.5 SENSICARE PI PIP GRN PWD F

## (undated) DEVICE — SUT VCRL 0 L18IN ABSRB UD TIE POLYGLACTIN

## (undated) DEVICE — TIP COVER ACCESSORY

## (undated) DEVICE — CAUTERY TIP TEFLON MEGADYNE

## (undated) DEVICE — GOWN,SIRUS,FABRIC-REINFORCED,X-LARGE: Brand: MEDLINE

## (undated) DEVICE — PACK CLOSING BUNDLE

## (undated) DEVICE — 3M™ RED DOT™ MONITORING ELECTRODE WITH FOAM TAPE AND STICKY GEL 2570-3, 3/BAG, 200/CASE, 54/PLT: Brand: RED DOT™

## (undated) DEVICE — GOWN SURG XL REINF SMS LEV 3 BLU SIRUS SET

## (undated) DEVICE — SYRINGE MED 20ML STD CLR PLAS LL TIP N CTRL

## (undated) DEVICE — REM POLYHESIVE ADULT PATIENT RETURN ELECTRODE: Brand: VALLEYLAB

## (undated) DEVICE — FENESTRATED BIPOLAR FORCEPS: Brand: ENDOWRIST

## (undated) DEVICE — BAG DRNGE 2000ML URIN INF CTRL ANTIREFLX

## (undated) DEVICE — KIT INCIS MGMT PREVENA DRAIN 13CM PEEL AND PLC DISP

## (undated) DEVICE — AIRSEAL 5 MM ACCESS PORT AND LOW PROFILE OBTURATOR WITH BLADELESS OPTICAL TIP, 120 MM LENGTH: Brand: AIRSEAL

## (undated) DEVICE — SIGMOIDOSCOPE LIGHTED BIOSEAL

## (undated) DEVICE — TIP GRSP DISP HNTR RENEW

## (undated) DEVICE — 3M™ TEGADERM™ TRANSPARENT FILM DRESSING FRAME STYLE, 1628, 6 IN X 8 IN (15 CM X 20 CM), 10/CT 8CT/CASE: Brand: 3M™ TEGADERM™

## (undated) DEVICE — SUT MCRYL 4-0 18IN PS-2 ABSRB UD 19MM 3/8 CIR

## (undated) DEVICE — MEDI-VAC TUBING CONNECTOR 6-IN-1 "Y" POLYPROPYLENE: Brand: CARDINAL HEALTH

## (undated) DEVICE — 40580 - THE PINK PAD - ADVANCED TRENDELENBURG POSITIONING KIT: Brand: 40580 - THE PINK PAD - ADVANCED TRENDELENBURG POSITIONING KIT

## (undated) DEVICE — VIOLET BRAIDED (POLYGLACTIN 910), SYNTHETIC ABSORBABLE SUTURE: Brand: COATED VICRYL

## (undated) DEVICE — SUTURE VCRL SZ 2-0 L18IN ABSRB UD POLYGLACTIN

## (undated) DEVICE — SYSTEM ACCS L100MM DIA5MM ABD SHLD BLADED Z

## (undated) DEVICE — SUT PDS II 1 36IN ABSRB VLT L36MM CT-1

## (undated) DEVICE — BLADE SURG NO11 SS POLYMER COAT STR MICROCOAT

## (undated) DEVICE — SLEEVE COMPR MD KNEE LEN SGL USE KENDALL SCD

## (undated) DEVICE — SOLUTION IRRIG 1000ML 0.9% NACL USP BTL

## (undated) DEVICE — BLADELESS OBTURATOR: Brand: WECK VISTA

## (undated) DEVICE — SUTURE VCRL SZ 0 L18IN ABSRB UD POLYGLACTIN

## (undated) DEVICE — MEGA SUTURECUT ND: Brand: ENDOWRIST

## (undated) DEVICE — CONNECTOR TBNG TRNSPAR PLAS 6IN1 Y SHP LTWT

## (undated) DEVICE — SUT ETHLN 2-0 18IN FS NABSRB BLK 26MM 3/8 CIR

## (undated) DEVICE — SOL  .9 1000ML BTL

## (undated) DEVICE — ANTIBACTERIAL UNDYED BRAIDED (POLYGLACTIN 910), SYNTHETIC ABSORBABLE SUTURE: Brand: COATED VICRYL

## (undated) DEVICE — NITINOL WIRE WITH HYDROPHILIC TIP: Brand: SENSOR

## (undated) DEVICE — TIP-UP FENESTRATED GRASPER: Brand: ENDOWRIST

## (undated) DEVICE — GLOVE,SURG,SENSICARE SLT,LF,PF,7: Brand: MEDLINE

## (undated) DEVICE — MONOPOLAR CURVED SCISSORS: Brand: ENDOWRIST

## (undated) DEVICE — LAPAROVUE VISIBILITY SYS

## (undated) DEVICE — COLUMN DRAPE

## (undated) DEVICE — AIRSEAL TRI-LUMEN LILTERED TUBE SET: Brand: AIRSEAL

## (undated) DEVICE — GAUZE SPONGES,12 PLY: Brand: CURITY

## (undated) DEVICE — PACK PBDS CYSTOSCOPY

## (undated) DEVICE — SUTURE PROL 2-0 L30IN N ABSRB BLU L26MM SH

## (undated) DEVICE — DRAPE,TOP,102X53,STERILE: Brand: MEDLINE

## (undated) DEVICE — COVER LT HNDL RIG FOR SUR CAM DISP

## (undated) DEVICE — SUT PROL 2-0 30IN SH NABSRB BLU L26MM 1/2 CIR

## (undated) DEVICE — SUTURE VICRYL 3-0 SH

## (undated) NOTE — LETTER
Patient Name: Trino Reddy        : 1985       Medical Record #: [id number]    CONSENT FOR PROCEDURES/SEDATION    Date: 2024       Time: 2:43 PM        1. I authorize the performance upon Trino Reddy the following:    __Image guided peritoneal abscess drain plaement.    2. I authorize Dr. Nestor Quiroga (and whomever is designated as the doctor’s assistant), to perform the above mentioned procedures.    3. If any unforeseen conditions arise during this procedure calling for additional procedures, operations, or medications (including anesthesia and blood transfusion), I  further request and authorize the doctor to do whatever he/she deems advisable in my interest.    4. I consent to the taking and reproduction of any photographs in the course of this procedure for professional purposes.    5. I consent to the administration of such sedation as may be considered necessary or advisable by the physician responsible for this service, with the exception of  _No known drug allergies.    6. I have been informed by my doctor of the nature and purpose of this procedure/sedation, possible alternative methods of treatment, risk involved and possible complications.      Signature of Patient:  ___________________________    Signature of person authorized to consent for patient: Relationship to patient:  ___________________________    ___________________    Witness: ____________________     Date: ______________    Provider: ____________________     Date: ______________

## (undated) NOTE — LETTER
2024    Return to Work    Name: Trino Reddy        : 1985    To Whom It May Concern,    Trino Reddy had surgery on 2024 and is:    Able to return to school / work without restrictions on 2024.      If there are any further questions, regarding this patient's care, please contact the patient directly.    Sincerely,    Dr. Hernandez

## (undated) NOTE — LETTER
[address]  [place] [facility]    Consent for Operation  Date: __________________                                Time: _______________    1.  I authorize the performance upon Demetris Chavez the following operation:  Procedur procedure has been videotaped, the surgeon will obtain the original videotape. The hospital will not be responsible for storage or maintenance of this tape.   7. For the purpose of advancing medical education, I consent to the admittance of observers to the STATEMENTS REQUIRING INSERTION OR COMPLETION WERE FILLED IN.     Signature of Patient:   ___________________________    When the patient is a minor or mentally incompetent to give consent:  Signature of person authorized to consent for patient: ____________ supplements, and pills I can buy without a prescription (including street drugs/illegal medications). Failure to inform my anesthesiologist about these medicines may increase my risk of anesthetic complications. iv.  If I am allergic to anything or have ha Anesthesiologist Signature     Date   Time  I have discussed the procedure and information above with the patient (or patient’s representative) and answered their questions. The patient or their representative has agreed to have anesthesia services.     ___

## (undated) NOTE — LETTER
Patient Name: Trino Reddy  Surgery Date: 6/26/2024  Medical Record: [id number] CSN: [id number]      Surgeon(s):  Jaylan Hernandez MD  Consent Procedure: ROBOTIC COLOSTOMY REVERSAL POSSIBLE OPEN CYSTOSCOPY AND STENTS ([facility])  Anesthesia Type: General      DUAL CASE WITH DR HERNANDEZ---NEED DR DIAZ SURGICAL CASE REQUEST.      THANK YOU,  PRE-ADMISSION TESTING  [phone]

## (undated) NOTE — LETTER
[address] [address]  [place], [address] [facility]    Consent for Operation  Date: __________________                                Time: _______________    1.  I authorize the performance upon Bennie Franks the following operation:  Incision videotape. The [facility] will not be responsible for storage or maintenance of this tape. 6. For the purpose of advancing medical education, I consent to the admittance of observers to the Operating Room.   7. I authorize the use of any specimen, organs, ti When the patient is a minor or mentally incompetent to give consent:  Signature of person authorized to consent for patient: ___________________________   Relationship to patient: ____________________________________________________    Signature of Witness these medicines may increase my risk of anesthetic complications. iv. If I am allergic to anything or have had a reaction to anesthesia before. 3. I understand how the anesthesia medicine will help me (benefits).   4. I understand that with any type of an patient’s representative) and answered their questions. The patient or their representative has agreed to have anesthesia services.     _____________________________________________________________________________  Witness        Date   Time  I have yamilet

## (undated) NOTE — LETTER
CLARIFICATION FOR E-SSS    To: DR. DIAZ      Patient Name: Trino Reddy-Age / Sex: 1985-A: 39 y  male   Medical Records: [id number] [facility]: [id number]      Procedure Description:  ROBOTIC COLOSTOMY REVERSAL POSSIBLE OPEN CYSTOSCOPY AND STENTS ([facility])    Please note that clarification is needed on the Electronic-Surgery Scheduling Sheet (E-SSS)  the original is included with this letter. Please have physician review and make changes on the faxed copy of the E-SSS.    MISSING LATERALITY FOR STENT PLACEMENT ON SURGICAL CASE REQUEST    Please review and submit change if needed      ALL CHANGES MUST BE DOCUMENTED ON THE E-SSS AND SIGNED BY THE PHYSICIAN    After physician has made the changes and signed the E-SSS please fax it to [phone] and these changes will then be made in Epic by the OR schedulers.     If you have any questions please call Pre-Admission Testing at [phone]    Thank you

## (undated) NOTE — LETTER
Patient Name: Trino Reddy  Surgery Date: 6/26/2024  Medical Record: [id number] CSN: [id number]      Surgeon(s):  Jaylan Hernandez MD Dahdaleh, Fadi, MD  Consent Procedure: ROBOTIC COLOSTOMY REVERSAL POSSIBLE OPEN CYSTO/STENTS ([facility])  Anesthesia Type: General  CONSENT INCORRECT , NEED TO CLARIFY CONSENT , IT CAN NOT SAY CYSTO/STENTS    THANK YOU, JULIO C PHILLIPS

## (undated) NOTE — LETTER
03/18/24    Dear Johnnie Mancilla MD,    I am seeing Trino Reddy in the office today after surgery.       Assessment   1. Status post Branden procedure (HCC)    2. Retroperitoneal abscess (HCC)      This is a very nice 39-year-old gentleman who returns for follow-up after undergoing emergent open low anterior resection with creation of end colostomy and drainage of retroperitoneal abscess when he presented with perforated diverticulitis on 2/17/2024.  Postoperative course was complicated by development of a recurrent left-sided retroperitoneal abscess.  He underwent image guided percutaneous drainage of the abscess on 2/23/2024 and again on 3/1/2024.    Patient is doing better overall.  The pain, redness and swelling along the left flank is improving.  He has not required any narcotic pain medication for the last 1 week.  No recent fevers.  He is tolerating diet and having regular stool output from his colostomy.  No concerns about his midline wound.  Patient's concern today is he has noted a milky substance with chunky material in his IR drain.  The drains are continuing to put out approximately 20 cc or more of serous fluid a day.  He is nearly done with antibiotics.    Patient appears well on exam today.  There is a well-healed midline scar and left-sided colostomy that is pink and productive of stool.  Left flank skin remains mildly indurated without any erythema or tenderness.  IR drains in place x 2 with drainage of serous fluid.  There is fibrinous exudate also present in the drain bulbs.     Plan   I reassured patient that the drain output remains normal-appearing.  The milky substance with chunks likely represents fibrinous exudate within the abscess cavity.  The output appears serous.  I have reviewed his imaging and there is no connection between this retroperitoneal abscess and the bowel or urinary system.    Avoid lifting anything heavier than 10 pounds for total of 6 weeks after surgery.  No  bathing or dietary restrictions from surgical standpoint.  I encouraged patient to take his antibiotics until completion.  He should follow-up with IR for CT abscessogram and drain removal once the output is 10 cc or less x 2 days per their recommendation.    Patient also inquired about timing of potential colostomy reversal.  I advised him that I recommend waiting at least 3 months from index surgery until considering colostomy reversal.  I would also recommend a preoperative colonoscopy prior to colostomy reversal to ensure there is no contaminant colorectal pathology.  I would like see the patient back in 1 month's time for ongoing follow-up.  Return precautions in the meantime discussed.  Patient expressed understanding and was agreeable to plan.      Thank you for allowing me to participate in your patient's care.    Best regards,  Jaylan Hernandez MD

## (undated) NOTE — LETTER
03/05/24    To whom it may concern -   This patient, Trino Reddy ([birth year]) had surgery on 2/17/24. He was seen in our office today 3/5/24. Please excuse him from work until his post-operative appointment on 3/18/24. Further recommendation and evaluation will be provided at this appointment.   Sincerely,   Mary Kamara PA-C